# Patient Record
Sex: FEMALE | Race: WHITE | NOT HISPANIC OR LATINO | Employment: UNEMPLOYED | URBAN - METROPOLITAN AREA
[De-identification: names, ages, dates, MRNs, and addresses within clinical notes are randomized per-mention and may not be internally consistent; named-entity substitution may affect disease eponyms.]

---

## 2017-07-02 DIAGNOSIS — E03.9 ACQUIRED HYPOTHYROIDISM: ICD-10-CM

## 2017-07-03 NOTE — TELEPHONE ENCOUNTER
levothyroxine (SYNTHROID, LEVOTHROID) 137 MCG tablet     Last Written Prescription Date: 7/1/2016  Last Quantity: 45, # refills: 3  Last Office Visit with [facility], [facility] or [facility] prescribing provider: 11/23/2016        TSH   Date Value Ref Range Status   07/01/2016 2.87 0.40 - 4.00 mU/L Final     levothyroxine (SYNTHROID, LEVOTHROID) 125 MCG tablet     Last Written Prescription Date: 7/1/2016  Last Quantity: 45, # refills: 3  Last Office Visit with [facility], [facility] or [facility] prescribing provider: 11/23/2016        TSH   Date Value Ref Range Status   07/01/2016 2.87 0.40 - 4.00 mU/L Final

## 2017-07-05 RX ORDER — LEVOTHYROXINE SODIUM 137 UG/1
TABLET ORAL
Qty: 45 TABLET | Refills: 0 | Status: SHIPPED | OUTPATIENT
Start: 2017-07-05 | End: 2017-07-06

## 2017-07-05 RX ORDER — LEVOTHYROXINE SODIUM 125 UG/1
TABLET ORAL
Qty: 45 TABLET | Refills: 0 | Status: SHIPPED | OUTPATIENT
Start: 2017-07-05 | End: 2017-07-06

## 2017-07-05 NOTE — TELEPHONE ENCOUNTER
Prescription approved per [facility] Refill Protocol.  Patient due for labs for further refills.  Estephanie Corea RN - BC

## 2017-07-06 ENCOUNTER — OFFICE VISIT (OUTPATIENT)
Dept: FAMILY MEDICINE | Facility: CLINIC | Age: 47
End: 2017-07-06
Payer: MEDICAID

## 2017-07-06 ENCOUNTER — RADIANT APPOINTMENT (OUTPATIENT)
Dept: MAMMOGRAPHY | Facility: CLINIC | Age: 47
End: 2017-07-06
Payer: MEDICAID

## 2017-07-06 VITALS
HEART RATE: 64 BPM | HEIGHT: 63 IN | DIASTOLIC BLOOD PRESSURE: 92 MMHG | BODY MASS INDEX: 40.75 KG/M2 | SYSTOLIC BLOOD PRESSURE: 124 MMHG | TEMPERATURE: 99 F | WEIGHT: 230 LBS | OXYGEN SATURATION: 98 %

## 2017-07-06 DIAGNOSIS — Z12.31 VISIT FOR SCREENING MAMMOGRAM: ICD-10-CM

## 2017-07-06 DIAGNOSIS — E03.9 ACQUIRED HYPOTHYROIDISM: ICD-10-CM

## 2017-07-06 DIAGNOSIS — I10 HYPERTENSION GOAL BP (BLOOD PRESSURE) < 140/90: ICD-10-CM

## 2017-07-06 DIAGNOSIS — R05.9 COUGH: ICD-10-CM

## 2017-07-06 DIAGNOSIS — Z00.00 ROUTINE GENERAL MEDICAL EXAMINATION AT A HEALTH CARE FACILITY: Primary | ICD-10-CM

## 2017-07-06 PROBLEM — H35.30 MACULAR DEGENERATION (SENILE) OF RETINA: Status: ACTIVE | Noted: 2017-07-06

## 2017-07-06 LAB — TSH SERPL DL<=0.005 MIU/L-ACNC: 1.18 MU/L (ref 0.4–4)

## 2017-07-06 PROCEDURE — 36415 COLL VENOUS BLD VENIPUNCTURE: CPT | Performed by: NURSE PRACTITIONER

## 2017-07-06 PROCEDURE — G0202 SCR MAMMO BI INCL CAD: HCPCS | Mod: TC

## 2017-07-06 PROCEDURE — 99396 PREV VISIT EST AGE 40-64: CPT | Performed by: NURSE PRACTITIONER

## 2017-07-06 PROCEDURE — 84443 ASSAY THYROID STIM HORMONE: CPT | Performed by: NURSE PRACTITIONER

## 2017-07-06 RX ORDER — PRENATAL VIT/IRON FUM/FOLIC AC 27MG-0.8MG
1 TABLET ORAL DAILY
COMMUNITY

## 2017-07-06 RX ORDER — LEVOTHYROXINE SODIUM 125 UG/1
TABLET ORAL
Qty: 45 TABLET | Refills: 3 | Status: SHIPPED | OUTPATIENT
Start: 2017-07-06 | End: 2018-12-10

## 2017-07-06 RX ORDER — LEVOTHYROXINE SODIUM 137 UG/1
TABLET ORAL
Qty: 45 TABLET | Refills: 3 | Status: SHIPPED | OUTPATIENT
Start: 2017-07-06 | End: 2018-12-10

## 2017-07-06 NOTE — MR AVS SNAPSHOT
After Visit Summary   7/6/2017    Eunice Barrientos    MRN: [id number]           Patient Information     Date Of Birth          1970        Visit Information        Provider Department      7/6/2017 12:00 PM Kiara Quintana APRN [facility]        Today's Diagnoses     Routine general medical examination at a health care facility    -  1    Acquired hypothyroidism        Hypertension goal BP (blood pressure) < 140/90        Cough          Care Instructions    [facility] Hypertension Study Summary     Thank you for your interest in the [facility] hypertension research study. If you would like to enroll or know more about using your genetics to determine which hypertensive medications may work best for you please contact the research coordinator as [phone] or email [email or web address]    If enrolled you would be asked to attend 5 to 8 study visits over the next 12 months.    The  may reach out to you to ask some questions about your medical history and availability for future visits.         Preventive Health Recommendations  Female Ages 40 to 49    Yearly exam:     See your health care provider every year in order to  1. Review health changes.   2. Discuss preventive care.    3. Review your medicines if your doctor prescribed any.      Get a Pap test every three years (unless you have an abnormal result and your provider advises testing more often).      If you get Pap tests with HPV test, you only need to test every 5 years, unless you have an abnormal result. You do not need a Pap test if your uterus was removed (hysterectomy) and you have not had cancer.      You should be tested each year for STDs (sexually transmitted diseases), if you're at risk.       Ask your doctor if you should have a mammogram.      Have a colonoscopy (test for colon cancer) if someone in your family has had colon cancer or polyps before age 50.       Have a cholesterol test every 5  years.       Have a diabetes test (fasting glucose) after age 45. If you are at risk for diabetes, you should have this test every 3 years.    Shots: Get a flu shot each year. Get a tetanus shot every 10 years.     Nutrition:     Eat at least 5 servings of fruits and vegetables each day.    Eat whole-grain bread, whole-wheat pasta and brown rice instead of white grains and rice.    Talk to your provider about Calcium and Vitamin D.     Lifestyle    Exercise at least 150 minutes a week (an average of 30 minutes a day, 5 days a week). This will help you control your weight and prevent disease.    Limit alcohol to one drink per day.    No smoking.     Wear sunscreen to prevent skin cancer.    See your dentist every six months for an exam and cleaning.      [facility]    If you have any questions regarding to your visit please contact your care team:       Team Red:   Clinic Hours Telephone Number   Dr. Dorota Quintana, NP   7am-7pm  Monday - Thursday   7am-5pm  Fridays  [phone]  (Appointment scheduling available 24/7)    Questions about your visit?   Team Line  [phone]   Urgent Care - [place] and [place] - 11am-9pm Monday-Friday Saturday-Sunday- 9am-5pm   [place] - 5pm-9pm Monday-Friday Saturday-Sunday- 9am-5pm  [phone] - [place]   [phone] - [place]       What options do I have for visits at the clinic other than the traditional office visit?  To expand how we care for you, many of our providers are utilizing electronic visits (e-visits) and telephone visits, when medically appropriate, for interactions with their patients rather than a visit in the clinic.   We also offer nurse visits for many medical concerns. Just like any other service, we will bill your insurance company for this type of visit based on time spent on the phone with your provider. Not all insurance companies cover these visits. Please check  "with your medical insurance if this type of visit is covered. You will be responsible for any charges that are not paid by your insurance.      E-visits via RxVantagehart:  generally incur a $35.00 fee.  Telephone visits:  Time spent on the phone: *charged based on time that is spent on the phone in increments of 10 minutes. Estimated cost:   5-10 mins $30.00   11-20 mins. $59.00   21-30 mins. $85.00     Use IdeaSquarest (secure email communication and access to your chart) to send your primary care provider a message or make an appointment. Ask someone on your Team how to sign up for Tripvisto.  For a Price Quote for your services, please call our PromoteSocial Line at [phone].      As always, Thank you for trusting us with your health care needs!  Kamla YAÑEZ MA            Follow-ups after your visit        Who to contact     If you have questions or need follow up information about today's clinic visit or your schedule please contact [facility] directly at [phone].  Normal or non-critical lab and imaging results will be communicated to you by RxVantagehart, letter or phone within 4 business days after the clinic has received the results. If you do not hear from us within 7 days, please contact the clinic through IdeaSquarest or phone. If you have a critical or abnormal lab result, we will notify you by phone as soon as possible.  Submit refill requests through Tripvisto or call your pharmacy and they will forward the refill request to us. Please allow 3 business days for your refill to be completed.          Additional Information About Your Visit        Tripvisto Information     Tripvisto lets you send messages to your doctor, view your test results, renew your prescriptions, schedule appointments and more. To sign up, go to [email or web address] . Click on \"Log in\" on the left side of the screen, which will take you to the Welcome page. Then click on \"Sign up Now\" on the right side of the page.     You will be " "asked to enter the access code listed below, as well as some personal information. Please follow the directions to create your username and password.     Your access code is: [email or web address]  Expires: 10/4/2017 12:43 PM     Your access code will  in 90 days. If you need help or a new code, please call your [place] clinic or [phone].        Care EveryWhere ID     This is your Care EveryWhere ID. This could be used by other organizations to access your [place] medical records  [id number]        Your Vitals Were     Pulse Temperature Height Last Period Pulse Oximetry Breastfeeding?    64 99  F (37.2  C) (Oral) 5' 3.25\" (1.607 m) 06/15/2017 (Approximate) 98% No    BMI (Body Mass Index)                   40.42 kg/m2            Blood Pressure from Last 3 Encounters:   17 (!) 124/92   16 128/88   16 120/82    Weight from Last 3 Encounters:   17 230 lb (104.3 kg)   16 222 lb (100.7 kg)   16 225 lb (102.1 kg)              We Performed the Following     TSH with free T4 reflex          Today's Medication Changes          These changes are accurate as of: 17 12:43 PM.  If you have any questions, ask your nurse or doctor.               Start taking these medicines.        Dose/Directions    omeprazole 20 MG CR capsule   Commonly known as:  priLOSEC   Used for:  Cough   Started by:  Kiara Quintana APRN CNP        Dose:  20 mg   Take 1 capsule (20 mg) by mouth daily   Quantity:  30 capsule   Refills:  1         These medicines have changed or have updated prescriptions.        Dose/Directions    * levothyroxine 125 MCG tablet   Commonly known as:  SYNTHROID/LEVOTHROID   This may have changed:  See the new instructions.   Used for:  Acquired hypothyroidism   Changed by:  Kiara Quintana APRN CNP        TAKE ONE TABLET BY MOUTH EVERY OTHER DAY ALTERNATING  WITH  THE  137  MCG  TABLETS   Quantity:  45 tablet   Refills:  3       * levothyroxine 137 MCG tablet "   Commonly known as:  SYNTHROID/LEVOTHROID   This may have changed:  See the new instructions.   Used for:  Acquired hypothyroidism   Changed by:  Kiara Quintana APRN CNP        TAKE ONE TABLET BY MOUTH EVERY OTHER DAY ALTERNATING  WITH  THE  125MCG  TABLETS   Quantity:  45 tablet   Refills:  3       * Notice:  This list has 2 medication(s) that are the same as other medications prescribed for you. Read the directions carefully, and ask your doctor or other care provider to review them with you.         Where to get your medicines      These medications were sent to [facility] [address] [facility] [address]  [address] [facility] [address]     Phone:  [phone]     levothyroxine 125 MCG tablet    levothyroxine 137 MCG tablet    omeprazole 20 MG CR capsule                Primary Care Provider Office Phone # Fax #    ABIOLA Burgess [phone]       [facility] [address] [place] [address]        Equal Access to Services     KASSI JAFFE : Hadii aad ku hadasho Soomaali, waaxda luqadaha, qaybta kaalmada adeegyada, waxay idiin hayaagamal greer . So [facility] [phone].    ATENCIÓN: Si habla español, tiene a garcia disposición servicios gratuitos de asistencia lingüística. [facility] [phone].    We comply with applicable federal civil rights laws and [place] laws. We do not discriminate on the basis of race, color, national origin, age, disability sex, sexual orientation or gender identity.            Thank you!     Thank you for choosing [facility]  for your care. Our goal is always to provide you with excellent care. Hearing back from our patients is one way we can continue to improve our services. Please take a few minutes to complete the written survey that you may receive in the mail after your visit with us. Thank you!             Your Updated Medication List - Protect others around you:  Learn how to safely use, store and throw away your medicines at www.disposemymeds.org.          This list is accurate as of: 7/6/17 12:43 PM.  Always use your most recent med list.                   Brand Name Dispense Instructions for use Diagnosis    * levothyroxine 125 MCG tablet    SYNTHROID/LEVOTHROID    45 tablet    TAKE ONE TABLET BY MOUTH EVERY OTHER DAY ALTERNATING  WITH  THE  137  MCG  TABLETS    Acquired hypothyroidism       * levothyroxine 137 MCG tablet    SYNTHROID/LEVOTHROID    45 tablet    TAKE ONE TABLET BY MOUTH EVERY OTHER DAY ALTERNATING  WITH  THE  125MCG  TABLETS    Acquired hypothyroidism       omeprazole 20 MG CR capsule    priLOSEC    30 capsule    Take 1 capsule (20 mg) by mouth daily    Cough       prenatal multivitamin  plus iron 27-0.8 MG Tabs per tablet      Take 1 tablet by mouth daily        PROBIOTIC DAILY PO           * Notice:  This list has 2 medication(s) that are the same as other medications prescribed for you. Read the directions carefully, and ask your doctor or other care provider to review them with you.

## 2017-07-06 NOTE — PATIENT INSTRUCTIONS
[facility] Hypertension Study Summary     Thank you for your interest in the [facility] hypertension research study. If you would like to enroll or know more about using your genetics to determine which hypertensive medications may work best for you please contact the research coordinator as [phone] or email [email or web address]    If enrolled you would be asked to attend 5 to 8 study visits over the next 12 months.    The  may reach out to you to ask some questions about your medical history and availability for future visits.         Preventive Health Recommendations  Female Ages 40 to 49    Yearly exam:     See your health care provider every year in order to  1. Review health changes.   2. Discuss preventive care.    3. Review your medicines if your doctor prescribed any.      Get a Pap test every three years (unless you have an abnormal result and your provider advises testing more often).      If you get Pap tests with HPV test, you only need to test every 5 years, unless you have an abnormal result. You do not need a Pap test if your uterus was removed (hysterectomy) and you have not had cancer.      You should be tested each year for STDs (sexually transmitted diseases), if you're at risk.       Ask your doctor if you should have a mammogram.      Have a colonoscopy (test for colon cancer) if someone in your family has had colon cancer or polyps before age 50.       Have a cholesterol test every 5 years.       Have a diabetes test (fasting glucose) after age 45. If you are at risk for diabetes, you should have this test every 3 years.    Shots: Get a flu shot each year. Get a tetanus shot every 10 years.     Nutrition:     Eat at least 5 servings of fruits and vegetables each day.    Eat whole-grain bread, whole-wheat pasta and brown rice instead of white grains and rice.    Talk to your provider about Calcium and Vitamin D.     Lifestyle    Exercise at least 150 minutes a week (an average of 30  minutes a day, 5 days a week). This will help you control your weight and prevent disease.    Limit alcohol to one drink per day.    No smoking.     Wear sunscreen to prevent skin cancer.    See your dentist every six months for an exam and cleaning.      [facility]    If you have any questions regarding to your visit please contact your care team:       Team Red:   Clinic Hours Telephone Number   Dr. Dorota Quintana, NP   7am-7pm  Monday - Thursday   7am-5pm  Fridays  [phone]  (Appointment scheduling available 24/7)    Questions about your visit?   Team Line  [phone]   Urgent Care - [place] and [place] - 11am-9pm Monday-Friday Saturday-Sunday- 9am-5pm   [place] - 5pm-9pm Monday-Friday Saturday-Sunday- 9am-5pm  [phone] - [place]   [phone] - [place]       What options do I have for visits at the clinic other than the traditional office visit?  To expand how we care for you, many of our providers are utilizing electronic visits (e-visits) and telephone visits, when medically appropriate, for interactions with their patients rather than a visit in the clinic.   We also offer nurse visits for many medical concerns. Just like any other service, we will bill your insurance company for this type of visit based on time spent on the phone with your provider. Not all insurance companies cover these visits. Please check with your medical insurance if this type of visit is covered. You will be responsible for any charges that are not paid by your insurance.      E-visits via Race Nation:  generally incur a $35.00 fee.  Telephone visits:  Time spent on the phone: *charged based on time that is spent on the phone in increments of 10 minutes. Estimated cost:   5-10 mins $30.00   11-20 mins. $59.00   21-30 mins. $85.00     Use Race Nation (secure email communication and access to your chart) to send your primary care provider a  message or make an appointment. Ask someone on your Team how to sign up for Healthkart.  For a Price Quote for your services, please call our Consumer Price Line at [phone].      As always, Thank you for trusting us with your health care needs!  Kamla YAÑEZ MA

## 2017-07-06 NOTE — NURSING NOTE
"Chief Complaint   Patient presents with     Physical       Initial /90  Pulse 64  Temp 99  F (37.2  C) (Oral)  Ht 5' 3.25\" (1.607 m)  Wt 230 lb (104.3 kg)  LMP 06/15/2017 (Approximate)  SpO2 98%  Breastfeeding? No  BMI 40.42 kg/m2 Estimated body mass index is 40.42 kg/(m^2) as calculated from the following:    Height as of this encounter: 5' 3.25\" (1.607 m).    Weight as of this encounter: 230 lb (104.3 kg).  Medication Reconciliation: complete   Kamla YAÑEZ MA      "

## 2017-07-06 NOTE — LETTER
[address] [place] [address]    July 7, 2017    Eunice Barrientos  [address]  [place] [facility] [address]          Dear Eunice,    Your results are normal    Enclosed is a copy of your results.     Results for orders placed or performed in visit on 07/06/17   TSH with free T4 reflex   Result Value Ref Range    TSH 1.18 0.40 - 4.00 mU/L       If you have any questions or concerns, please call myself or my nurse at [phone].      Sincerely,        Kiara Quintana CNP/HA

## 2017-07-06 NOTE — PROGRESS NOTES
SUBJECTIVE:   CC: Eunice Barrientos is an 47 year old woman who presents for preventive health visit.     Healthy Habits:    Do you get at least three servings of calcium containing foods daily (dairy, green leafy vegetables, etc.)? no    Amount of exercise or daily activities, outside of work: active job    Problems taking medications regularly No    Medication side effects: No    Have you had an eye exam in the past two years? no    Do you see a dentist twice per year? no    Do you have sleep apnea, excessive snoring or daytime drowsiness?no    Had lost insurance so off BP meds. Just purchased a gym membership and plans to start exercising.      Today's PHQ-2 Score:   PHQ-2 ( 1999 [employer]) 7/1/2016 12/31/2015   Q1: Little interest or pleasure in doing things 0 0   Q2: Feeling down, depressed or hopeless 0 0   PHQ-2 Score 0 0       Abuse: Current or Past(Physical, Sexual or Emotional)- No  Do you feel safe in your environment - Yes    Social History   Substance Use Topics     Smoking status: Never Smoker     Smokeless tobacco: Not on file     Alcohol use No     The patient does not drink >3 drinks per day nor >7 drinks per week.    Reviewed orders with patient.  Reviewed health maintenance and updated orders accordingly - Yes  Labs reviewed in EPIC  Patient Active Problem List   Diagnosis     Hypothyroidism     Intracranial shunt     CARDIOVASCULAR SCREENING; LDL GOAL LESS THAN 130     Hypertension goal BP (blood pressure) < 140/90     Macular degeneration (senile) of retina     Past Surgical History:   Procedure Laterality Date     CHOLECYSTECTOMY       COLPOSCOPY CERVIX, BIOPSY CERVIX, ENDOCERVICAL CURETTAGE, COMBINED  2009    several, earliest was 1992     ENT SURGERY       intracranial shunt       LEEP TX, CERVICAL  1995     OOPHORECTOMY Left        Social History   Substance Use Topics     Smoking status: Never Smoker     Smokeless tobacco: Not on file     Alcohol use No     Family History   Problem Relation  "Age of Onset     DIABETES Maternal Grandmother      Hypertension Maternal Grandmother      CANCER Maternal Aunt      CEREBROVASCULAR DISEASE No family hx of      Thyroid Disease No family hx of      Glaucoma No family hx of      Macular Degeneration No family hx of            Patient under age 50, mutual decision reflected in health maintenance.      Pertinent mammograms are reviewed under the imaging tab.  History of abnormal Pap smear: YES - updated in Problem List and Health Maintenance accordingly    Reviewed and updated as needed this visit by clinical staff         Reviewed and updated as needed this visit by Provider            ROS:  C: NEGATIVE for fever, chills, change in weight  I: NEGATIVE for worrisome rashes, moles or lesions  E: NEGATIVE for vision changes or irritation  ENT: NEGATIVE for ear, mouth and throat problems  RESP: Occasional dry, nagging cough in absence of URI symptoms or SOB  B: NEGATIVE for masses, tenderness or discharge  CV: NEGATIVE for chest pain, palpitations or peripheral edema  GI: Occasional heartburn. Regular bowel movements.  : NEGATIVE for unusual urinary or vaginal symptoms. Periods are regular.  M: NEGATIVE for significant arthralgias or myalgia  N: NEGATIVE for weakness, dizziness or paresthesias  P: NEGATIVE for changes in mood or affect    OBJECTIVE:   BP (!) 124/92  Pulse 64  Temp 99  F (37.2  C) (Oral)  Ht 5' 3.25\" (1.607 m)  Wt 230 lb (104.3 kg)  LMP 06/15/2017 (Approximate)  SpO2 98%  Breastfeeding? No  BMI 40.42 kg/m2  EXAM:  GENERAL: healthy, alert and no distress  EYES: Eyes grossly normal to inspection, PERRL and conjunctivae and sclerae normal  HENT: Right ear canals and TM normal, Left TM with significant scar tissue, post-auricular scar from prior mastoidectomy, nose and mouth without ulcers or lesions  NECK: no adenopathy, no asymmetry, masses, or scars and thyroid normal to palpation  RESP: lungs clear to auscultation - no rales, rhonchi or " wheezes  BREAST: normal without masses, tenderness or nipple discharge and no palpable axillary masses or adenopathy  CV: regular rate and rhythm, normal S1 S2, no S3 or S4, no murmur, click or rub, no peripheral edema and peripheral pulses strong  ABDOMEN: soft, nontender, no hepatosplenomegaly, no masses and bowel sounds normal  MS: no gross musculoskeletal defects noted, no edema  SKIN: no suspicious lesions or rashes  NEURO: Normal strength and tone, mentation intact and speech normal  PSYCH: mentation appears normal, affect normal/bright    ASSESSMENT/PLAN:   1. Routine general medical examination at a health care facility      2. Acquired hypothyroidism  Stable, continue current medications without change. Needs labs today  - levothyroxine (SYNTHROID/LEVOTHROID) 125 MCG tablet; TAKE ONE TABLET BY MOUTH EVERY OTHER DAY ALTERNATING  WITH  THE  137  MCG  TABLETS  Dispense: 45 tablet; Refill: 3  - levothyroxine (SYNTHROID/LEVOTHROID) 137 MCG tablet; TAKE ONE TABLET BY MOUTH EVERY OTHER DAY ALTERNATING  WITH  THE  125MCG  TABLETS  Dispense: 45 tablet; Refill: 3  - TSH with free T4 reflex    3. Hypertension goal BP (blood pressure) < 140/90  She is interested in PGEN study- given information to contact . If she qualifies, she will schedule follow up with me to get enrolled. If she does not qualify, please contact clinic and will restart HCTZ over the phone prior to our follow up visit.    4. Cough  Suspect due to GERD. Start PPI daily for 1 month. If symptoms controlled can wean off or use PRN.  - omeprazole (PRILOSEC) 20 MG CR capsule; Take 1 capsule (20 mg) by mouth daily  Dispense: 30 capsule; Refill: 1    COUNSELING:   Reviewed preventive health counseling, as reflected in patient instructions       Regular exercise       Healthy diet/nutrition       Osteoporosis Prevention/Bone Health         reports that she has never smoked. She does not have any smokeless tobacco history on  "file.    Estimated body mass index is 39.33 kg/(m^2) as calculated from the following:    Height as of 11/23/16: 5' 3\" (1.6 m).    Weight as of 11/23/16: 222 lb (100.7 kg).   Weight management plan: Discussed healthy diet and exercise guidelines and patient will follow up in 12 months in clinic to re-evaluate.    Counseling Resources:  ATP IV Guidelines  Pooled Cohorts Equation Calculator  Breast Cancer Risk Calculator  FRAX Risk Assessment  ICSI Preventive Guidelines  Dietary Guidelines for Americans, 2010  USDA's MyPlate  ASA Prophylaxis  Lung CA Screening    ABIOLA Burgess [facility]  "

## 2017-11-07 ENCOUNTER — TELEPHONE (OUTPATIENT)
Dept: FAMILY MEDICINE | Facility: CLINIC | Age: 47
End: 2017-11-07

## 2017-11-07 NOTE — LETTER
November 7, 2017          Eunice Barrientos,  [address]   [place] [address]      Dear Eunice Barrientos      Monitoring and managing your preventative and chronic health conditions are very important to us. Our records indicate that you have not scheduled for Appointment with your provider  which was recommended by Kiara Quintana.      If you have received your health care elsewhere, please call the clinic so the information can be documented in your chart.    Please call [phone] or message us through your [employer] account to schedule an appointment or provide information for your chart.     Feel free to contact us if you have any questions or concerns!    I look forward to seeing you and working with you on your health care needs.     Sincerely,       Kiara Quintana / Karly SALES CMA ([facility])

## 2017-11-07 NOTE — TELEPHONE ENCOUNTER
Panel Management Review      Patient has the following on her problem list:     Hypertension   Last three blood pressure readings:  BP Readings from Last 3 Encounters:   07/06/17 (!) 124/92   11/23/16 128/88   07/08/16 120/82     Blood pressure: FAILED    HTN Guidelines:  Age 18-59 BP range:  Less than 140/90  Age 60-85 with Diabetes:  Less than 140/90  Age 60-85 without Diabetes:  less than 150/90        Composite cancer screening  Chart review shows that this patient is due/due soon for the following None  Summary:    Patient is due/failing the following:   BP CHECK    Action needed:   Patient needs office visit for BP check.    Type of outreach:    Sent letter.    Questions for provider review:    None                                                                                                                                    Karly Deshpande CMA ([facility])       Chart routed to sent letter .

## 2018-02-20 ENCOUNTER — TELEPHONE (OUTPATIENT)
Dept: FAMILY MEDICINE | Facility: CLINIC | Age: 48
End: 2018-02-20

## 2018-02-20 NOTE — LETTER
February 20, 2018          Eunice Barrientos,  [address]   [place] [address]        Dear Eunice Barrientos      Monitoring and managing your preventative and chronic health conditions are very important to us. Our records indicate that you have not scheduled for Appointment with your provider  which was recommended by Kiara Quintana.      If you have received your health care elsewhere, please call the clinic so the information can be documented in your chart.    Please call [phone] or message us through your [employer] account to schedule an appointment or provide information for your chart.     Feel free to contact us if you have any questions or concerns!    I look forward to seeing you and working with you on your health care needs.     Sincerely,         Kiara Quintana / heidi

## 2018-02-20 NOTE — TELEPHONE ENCOUNTER
Panel Management Review      Patient has the following on her problem list:     Hypertension   Last three blood pressure readings:  BP Readings from Last 3 Encounters:   07/06/17 (!) 124/92   11/23/16 128/88   07/08/16 120/82     Blood pressure: FAILED    HTN Guidelines:  Age 18-59 BP range:  Less than 140/90  Age 60-85 with Diabetes:  Less than 140/90  Age 60-85 without Diabetes:  less than 150/90      Composite cancer screening  Chart review shows that this patient is due/due soon for the following None  Summary:    Patient is due/failing the following:   BP CHECK    Action needed:   Patient needs office visit for her blood pressure.    Type of outreach:    Sent letter.    Questions for provider review:    None                                                                                                                                    Syl Coe MA

## 2018-05-24 ENCOUNTER — TELEPHONE (OUTPATIENT)
Dept: FAMILY MEDICINE | Facility: CLINIC | Age: 48
End: 2018-05-24

## 2018-05-24 NOTE — LETTER
May 24, 2018        Eunice Barrientos,  [address] [facility] [address]        Dear Eunice Barrientos      Monitoring and managing your preventative and chronic health conditions are very important to us. Our records indicate that you have not scheduled for a Blood Pressure follow up which was recommended by Kiara Quintana.      If you have received your health care elsewhere, please call the clinic so the information can be documented in your chart.    Please call [phone] or message us through your [email or web address] account to schedule an appointment or provide information for your chart.     Feel free to contact us if you have any questions or concerns!    I look forward to seeing you and working with you on your health care needs.     Sincerely,         Kiara Quintana / heidi

## 2018-05-24 NOTE — TELEPHONE ENCOUNTER
Panel Management Review      Patient has the following on her problem list:     Hypertension   Last three blood pressure readings:  BP Readings from Last 3 Encounters:   07/06/17 (!) 124/92   11/23/16 128/88   07/08/16 120/82     Blood pressure: FAILED    HTN Guidelines:  Age 18-59 BP range:  Less than 140/90  Age 60-85 with Diabetes:  Less than 140/90  Age 60-85 without Diabetes:  less than 150/90      Composite cancer screening  Chart review shows that this patient is due/due soon for the following None  Summary:    Patient is due/failing the following:   BP CHECK    Action needed:   Patient needs office visit for blood pressure check.    Type of outreach:    Sent letter.    Questions for provider review:    None                                                                                                                                    Syl Coe MA

## 2018-06-07 ENCOUNTER — OFFICE VISIT (OUTPATIENT)
Dept: FAMILY MEDICINE | Facility: CLINIC | Age: 48
End: 2018-06-07
Payer: COMMERCIAL

## 2018-06-07 VITALS
HEIGHT: 63 IN | SYSTOLIC BLOOD PRESSURE: 142 MMHG | BODY MASS INDEX: 41.36 KG/M2 | WEIGHT: 233.4 LBS | HEART RATE: 98 BPM | OXYGEN SATURATION: 98 % | RESPIRATION RATE: 18 BRPM | DIASTOLIC BLOOD PRESSURE: 98 MMHG | TEMPERATURE: 98.1 F

## 2018-06-07 DIAGNOSIS — Z98.2 INTRACRANIAL SHUNT: ICD-10-CM

## 2018-06-07 DIAGNOSIS — H35.30 MACULAR DEGENERATION (SENILE) OF RETINA: ICD-10-CM

## 2018-06-07 DIAGNOSIS — H43.393 VITREOUS FLOATERS OF BOTH EYES: ICD-10-CM

## 2018-06-07 DIAGNOSIS — I10 ESSENTIAL HYPERTENSION: ICD-10-CM

## 2018-06-07 DIAGNOSIS — R51.9 NONINTRACTABLE EPISODIC HEADACHE, UNSPECIFIED HEADACHE TYPE: Primary | ICD-10-CM

## 2018-06-07 LAB
ANION GAP SERPL CALCULATED.3IONS-SCNC: 12 MMOL/L (ref 3–14)
BASOPHILS # BLD AUTO: 0 10E9/L (ref 0–0.2)
BASOPHILS NFR BLD AUTO: 0.4 %
BUN SERPL-MCNC: 14 MG/DL (ref 7–30)
CALCIUM SERPL-MCNC: 8.4 MG/DL (ref 8.5–10.1)
CHLORIDE SERPL-SCNC: 109 MMOL/L (ref 94–109)
CO2 SERPL-SCNC: 21 MMOL/L (ref 20–32)
CREAT SERPL-MCNC: 0.84 MG/DL (ref 0.52–1.04)
DIFFERENTIAL METHOD BLD: NORMAL
EOSINOPHIL # BLD AUTO: 0.1 10E9/L (ref 0–0.7)
EOSINOPHIL NFR BLD AUTO: 1.2 %
ERYTHROCYTE [DISTWIDTH] IN BLOOD BY AUTOMATED COUNT: 12.7 % (ref 10–15)
GFR SERPL CREATININE-BSD FRML MDRD: 73 ML/MIN/1.7M2
GLUCOSE SERPL-MCNC: 82 MG/DL (ref 70–99)
HCT VFR BLD AUTO: 40.5 % (ref 35–47)
HGB BLD-MCNC: 13.6 G/DL (ref 11.7–15.7)
LYMPHOCYTES # BLD AUTO: 1.8 10E9/L (ref 0.8–5.3)
LYMPHOCYTES NFR BLD AUTO: 25.1 %
MCH RBC QN AUTO: 32.7 PG (ref 26.5–33)
MCHC RBC AUTO-ENTMCNC: 33.6 G/DL (ref 31.5–36.5)
MCV RBC AUTO: 97 FL (ref 78–100)
MONOCYTES # BLD AUTO: 0.4 10E9/L (ref 0–1.3)
MONOCYTES NFR BLD AUTO: 5.4 %
NEUTROPHILS # BLD AUTO: 4.9 10E9/L (ref 1.6–8.3)
NEUTROPHILS NFR BLD AUTO: 67.9 %
PLATELET # BLD AUTO: 248 10E9/L (ref 150–450)
POTASSIUM SERPL-SCNC: 4.1 MMOL/L (ref 3.4–5.3)
RBC # BLD AUTO: 4.16 10E12/L (ref 3.8–5.2)
SODIUM SERPL-SCNC: 142 MMOL/L (ref 133–144)
TSH SERPL DL<=0.005 MIU/L-ACNC: 2.48 MU/L (ref 0.4–4)
WBC # BLD AUTO: 7.3 10E9/L (ref 4–11)

## 2018-06-07 PROCEDURE — 85025 COMPLETE CBC W/AUTO DIFF WBC: CPT | Performed by: FAMILY MEDICINE

## 2018-06-07 PROCEDURE — 84443 ASSAY THYROID STIM HORMONE: CPT | Performed by: FAMILY MEDICINE

## 2018-06-07 PROCEDURE — 80048 BASIC METABOLIC PNL TOTAL CA: CPT | Performed by: FAMILY MEDICINE

## 2018-06-07 PROCEDURE — 99214 OFFICE O/P EST MOD 30 MIN: CPT | Performed by: FAMILY MEDICINE

## 2018-06-07 PROCEDURE — 36415 COLL VENOUS BLD VENIPUNCTURE: CPT | Performed by: FAMILY MEDICINE

## 2018-06-07 RX ORDER — LISINOPRIL 10 MG/1
10 TABLET ORAL DAILY
Qty: 90 TABLET | Refills: 3 | Status: SHIPPED | OUTPATIENT
Start: 2018-06-07 | End: 2018-12-10 | Stop reason: SINTOL

## 2018-06-07 NOTE — PATIENT INSTRUCTIONS
Please make appointment for MRI  See opthamlogist Today  Follow up Primary Provider tomorrow  Start Lisinopril daily  Make appointment with Neurologist  Take care  Katherin Palm MD                                         Dietary Approaches to Stop Hypertension (The DASH Diet)   What is hypertension?   Hypertension is blood pressure that keeps being higher than normal. Blood pressure is the force of blood against artery walls as the heart pumps blood through the body. Blood pressure can be unhealthy if it is above 120/80. The higher your blood pressure, the greater the health risk.   High blood pressure can be controlled if you take these steps:   Maintain a healthy weight.   Are physically active.   Follow a healthy eating plan, which includes foods that do not have a lot of salt and sodium.   Do not drink a lot of alcohol.   Diet affects high blood pressure. Following the DASH diet and reducing the amount of sodium in your diet will help lower your blood pressure. It will also help prevent high blood pressure.   What is the DASH diet?   Dietary Approaches to Stop Hypertension (DASH) is a diet that is low in saturated fat, cholesterol, and total fat. It emphasizes fruits, vegetables, and low-fat dairy foods. The DASH diet also includes whole-grain products, fish, poultry, and nuts. It encourages fewer servings of red meat, sweets, and sugar-containing beverages. It is rich in magnesium, potassium, and calcium, as well as protein and fiber.   How do I get started on the DASH diet?   The DASH diet requires no special foods and has no hard-to-follow recipes. Start by seeing how DASH compares with your current eating habits.   The DASH eating plan illustrated below is based on a diet of 2,000 calories a day. Your healthcare provider or a dietitian can help you determine how many calories a day you need. Most adults need somewhere between 1600 and 2800 calories a day. Serving sizes for different foods vary from 1/2 cup to  1 and 1/4 cups. Check product nutrition labels for serving sizes and the number of calories per serving.                      Number of        Examples of  Food Group      servings         serving size  ----------------------------------------------------------------    Grains and      7 to 8 per day   1 slice of bread  grain products                   1 cup ready-to-eat cold cereal                                   1/2 cup cooked rice, pasta,                                   or cereal    Vegetables      4 to 5 per day   1 cup raw leafy vegetable                                   1/2 cup cooked vegetable                                   6 ounces (oz) vegetable juice      Fruits          4 to 5 per day   1 medium fruit                                   1/4 cup dried fruit                                   1/2 cup fresh, frozen, or                                   canned fruit                                   6 oz fruit juice      Low-fat or      2 to 3 per day   8 oz milk  fat-free                         1 cup yogurt  dairy foods                      1 and 1/2 oz cheese    Lean meats,  poultry,        2 or fewer per   3 oz cooked lean meat,  or fish         day              skinless poultry, or fish    Nuts, seeds,    4 to 5 per week  1/3 cup or 1 and 1/2 oz nuts  and dry beans                    1 tablespoon or 1/2 oz seeds                                   1/2 cup cooked dry beans    Fats and oils   2 to 3 per day   1 teaspoon soft margarine                                   1 tablespoon low-fat mayonnaise                                   2 tablespoons light salad                                   dressing                                   1 teaspoon vegetable oil    Sweets          5 per week       1 tablespoon sugar                                   1 tablespoon jelly or jam                                   1/2 oz jelly beans                                   8 oz  lemonade  ----------------------------------------------------------------  Make changes gradually. Here are some suggestions that might help:   If you now eat 1 or 2 servings of vegetables a day, add a serving at lunch and another at dinner.   If you have not been eating fruit regularly, or have only juice at breakfast, add a serving to your meals or have it as a snack.   Drink milk or water with lunch or dinner instead of soda, sugar-sweetened tea, or alcohol. Choose low-fat (1%) or fat-free (nonfat) dairy products so that you are eating fewer calories and less saturated fat, total fat, and cholesterol.   Read food labels on margarines and salad dressings to choose products lowest in fat.   If you now eat large portions of meat, slowly cut back--by a half or a third at each meal. Limit meat to 6 ounces a day (two 3-ounce servings). Three to 4 ounces is about the size of a deck of cards.   Have 2 or more meatless meals each week. Increase servings of vegetables, rice, pasta, and beans in all meals. Try casseroles, pasta, and stir-palmer dishes, which have less meat and more vegetables, grains, and beans.   Use fruits canned in their own juice. Fresh fruits require little or no preparation. Dried fruits are a good choice to carry with you or to have ready in the car.   Try these snacks ideas: unsalted pretzels or nuts mixed with raisins, julita crackers, low-fat and fat-free yogurt or frozen yogurt, popcorn with no salt or butter added, and raw vegetables.   Choose whole-grain foods to get more nutrients, including minerals and fiber. For example, choose whole-wheat bread, whole-grain cereals, or brown rice.   Use fresh, frozen, or no-salt-added canned vegetables.   Remember to also reduce the salt and sodium in your diet. Try to have no more than 2000 milligrams (mg) of sodium per day, with a goal of further reducing the sodium to 1500 mg per day. Three important ways to reduce sodium are:   Eat food products with  reduced-sodium or no salt added.   Use less salt when you prepare foods and do not add salt to your food at the table.   Read food labels. Aim for foods that contain less than 5% of the daily value of sodium.   The DASH eating plan is not designed for weight loss. But it contains many lower-calorie foods, such as fruits and vegetables. You can make it lower in calories by replacing high-calorie foods with more fruits and vegetables. Some ideas to increase fruits and vegetables and decrease calories include:   Eat a medium apple instead of 4 shortbread cookies. You'll save 80 calories.   Eat 1/4 cup of dried apricots instead of a 2-ounce bag of pork rinds. You'll save 230 calories.   Have a hamburger that's 3 ounces instead of 6 ounces. Add a 1/2 cup serving of carrots and a 1/2 cup serving of spinach. You'll save more than 200 calories.   Instead of 5 ounces of chicken, have a stir palmer with 2 ounces of chicken and 1 and 1/2 cups of raw vegetables. Use just a small amount of vegetable oil. You'll save 50 calories.   Have a 1/2 cup serving of low-fat frozen yogurt instead of a 1-and-1/2-ounce chocolate bar. You'll save about 110 calories.   Use low-fat or fat-free condiments, such as fat-free salad dressings.   Eat smaller portions. Cut back gradually.   Use food labels to compare fat and calorie content in packaged foods. Items marked low fat or fat free may be lower in fat but not lower in calories than their regular versions.   Limit foods with lots of added sugar, such as pies, flavored yogurts, candy bars, ice cream, sherbet, regular soft drinks, and fruit drinks.   Drink water or club soda instead of cola or other soda drinks.     For more information, see the National Heart, Lung, and Blood [place] Web site at: http://www.nhlbi.nih.gov/health/public/heart/hbp/dash/.     [facility] right across the [address] from [facility] on [address]. appt at 5:15pm

## 2018-06-07 NOTE — LETTER
[facility]  [address] [place] [address]    June 11, 2018    Eunice Barrientos  [address]  [place] [address]          Dear Eunice,    [facility] Thyroid test  Electrolytes and kidney tests are normal.   Normal Blood count  Take care    Enclosed is a copy of your results.     Results for orders placed or performed in visit on 06/07/18   TSH WITH FREE T4 REFLEX   Result Value Ref Range    TSH 2.48 0.40 - 4.00 mU/L   Basic metabolic panel   Result Value Ref Range    Sodium 142 133 - 144 mmol/L    Potassium 4.1 3.4 - 5.3 mmol/L    Chloride 109 94 - 109 mmol/L    Carbon Dioxide 21 20 - 32 mmol/L    Anion Gap 12 3 - 14 mmol/L    Glucose 82 70 - 99 mg/dL    Urea Nitrogen 14 7 - 30 mg/dL    Creatinine 0.84 0.52 - 1.04 mg/dL    GFR Estimate 73 >60 mL/min/1.7m2    GFR Estimate If Black 88 >60 mL/min/1.7m2    Calcium 8.4 (L) 8.5 - 10.1 mg/dL   CBC with platelets differential   Result Value Ref Range    WBC 7.3 4.0 - 11.0 10e9/L    RBC Count 4.16 3.8 - 5.2 10e12/L    Hemoglobin 13.6 11.7 - 15.7 g/dL    Hematocrit 40.5 35.0 - 47.0 %    MCV 97 78 - 100 fl    MCH 32.7 26.5 - 33.0 pg    MCHC 33.6 31.5 - 36.5 g/dL    RDW 12.7 10.0 - 15.0 %    Platelet Count 248 150 - 450 10e9/L    Diff Method Automated Method     % Neutrophils 67.9 %    % Lymphocytes 25.1 %    % Monocytes 5.4 %    % Eosinophils 1.2 %    % Basophils 0.4 %    Absolute Neutrophil 4.9 1.6 - 8.3 10e9/L    Absolute Lymphocytes 1.8 0.8 - 5.3 10e9/L    Absolute Monocytes 0.4 0.0 - 1.3 10e9/L    Absolute Eosinophils 0.1 0.0 - 0.7 10e9/L    Absolute Basophils 0.0 0.0 - 0.2 10e9/L       If you have any questions or concerns, please call myself or my nurse at [phone].      Sincerely,        Katherin Palm MD/steve

## 2018-06-07 NOTE — MR AVS SNAPSHOT
After Visit Summary   6/7/2018    Eunice Barrientos    MRN: [id number]           Patient Information     Date Of Birth          1970        Visit Information        Provider Department      6/7/2018 12:40 PM Katherin Palm MD [facility]        Today's Diagnoses     Nonintractable episodic headache, unspecified headache type    -  1    Vitreous floaters of both eyes        Essential hypertension        Macular degeneration (senile) of retina        Intracranial shunt          Care Instructions    Please make appointment for MRI  See opthamlogist Today  Follow up Primary Provider tomorrow  Start Lisinopril daily  Make appointment with Neurologist  Take care  Katherin Palm MD                                         Dietary Approaches to Stop Hypertension (The DASH Diet)   What is hypertension?   Hypertension is blood pressure that keeps being higher than normal. Blood pressure is the force of blood against artery walls as the heart pumps blood through the body. Blood pressure can be unhealthy if it is above 120/80. The higher your blood pressure, the greater the health risk.   High blood pressure can be controlled if you take these steps:   Maintain a healthy weight.   Are physically active.   Follow a healthy eating plan, which includes foods that do not have a lot of salt and sodium.   Do not drink a lot of alcohol.   Diet affects high blood pressure. Following the DASH diet and reducing the amount of sodium in your diet will help lower your blood pressure. It will also help prevent high blood pressure.   What is the DASH diet?   Dietary Approaches to Stop Hypertension (DASH) is a diet that is low in saturated fat, cholesterol, and total fat. It emphasizes fruits, vegetables, and low-fat dairy foods. The DASH diet also includes whole-grain products, fish, poultry, and nuts. It encourages fewer servings of red meat, sweets, and sugar-containing beverages. It is rich in magnesium, potassium,  and calcium, as well as protein and fiber.   How do I get started on the DASH diet?   The DASH diet requires no special foods and has no hard-to-follow recipes. Start by seeing how DASH compares with your current eating habits.   The DASH eating plan illustrated below is based on a diet of 2,000 calories a day. Your healthcare provider or a dietitian can help you determine how many calories a day you need. Most adults need somewhere between 1600 and 2800 calories a day. Serving sizes for different foods vary from 1/2 cup to 1 and 1/4 cups. Check product nutrition labels for serving sizes and the number of calories per serving.                      Number of        Examples of  Food Group      servings         serving size  ----------------------------------------------------------------    Grains and      7 to 8 per day   1 slice of bread  grain products                   1 cup ready-to-eat cold cereal                                   1/2 cup cooked rice, pasta,                                   or cereal    Vegetables      4 to 5 per day   1 cup raw leafy vegetable                                   1/2 cup cooked vegetable                                   6 ounces (oz) vegetable juice      Fruits          4 to 5 per day   1 medium fruit                                   1/4 cup dried fruit                                   1/2 cup fresh, frozen, or                                   canned fruit                                   6 oz fruit juice      Low-fat or      2 to 3 per day   8 oz milk  fat-free                         1 cup yogurt  dairy foods                      1 and 1/2 oz cheese    Lean meats,  poultry,        2 or fewer per   3 oz cooked lean meat,  or fish         day              skinless poultry, or fish    Nuts, seeds,    4 to 5 per week  1/3 cup or 1 and 1/2 oz nuts  and dry beans                    1 tablespoon or 1/2 oz seeds                                   1/2 cup cooked dry beans    Fats  and oils   2 to 3 per day   1 teaspoon soft margarine                                   1 tablespoon low-fat mayonnaise                                   2 tablespoons light salad                                   dressing                                   1 teaspoon vegetable oil    Sweets          5 per week       1 tablespoon sugar                                   1 tablespoon jelly or jam                                   1/2 oz jelly beans                                   8 oz lemonade  ----------------------------------------------------------------  Make changes gradually. Here are some suggestions that might help:   If you now eat 1 or 2 servings of vegetables a day, add a serving at lunch and another at dinner.   If you have not been eating fruit regularly, or have only juice at breakfast, add a serving to your meals or have it as a snack.   Drink milk or water with lunch or dinner instead of soda, sugar-sweetened tea, or alcohol. Choose low-fat (1%) or fat-free (nonfat) dairy products so that you are eating fewer calories and less saturated fat, total fat, and cholesterol.   Read food labels on margarines and salad dressings to choose products lowest in fat.   If you now eat large portions of meat, slowly cut back--by a half or a third at each meal. Limit meat to 6 ounces a day (two 3-ounce servings). Three to 4 ounces is about the size of a deck of cards.   Have 2 or more meatless meals each week. Increase servings of vegetables, rice, pasta, and beans in all meals. Try casseroles, pasta, and stir-palmer dishes, which have less meat and more vegetables, grains, and beans.   Use fruits canned in their own juice. Fresh fruits require little or no preparation. Dried fruits are a good choice to carry with you or to have ready in the car.   Try these snacks ideas: unsalted pretzels or nuts mixed with raisins, julita crackers, low-fat and fat-free yogurt or frozen yogurt, popcorn with no salt or butter added,  and raw vegetables.   Choose whole-grain foods to get more nutrients, including minerals and fiber. For example, choose whole-wheat bread, whole-grain cereals, or brown rice.   Use fresh, frozen, or no-salt-added canned vegetables.   Remember to also reduce the salt and sodium in your diet. Try to have no more than 2000 milligrams (mg) of sodium per day, with a goal of further reducing the sodium to 1500 mg per day. Three important ways to reduce sodium are:   Eat food products with reduced-sodium or no salt added.   Use less salt when you prepare foods and do not add salt to your food at the table.   Read food labels. Aim for foods that contain less than 5% of the daily value of sodium.   The DASH eating plan is not designed for weight loss. But it contains many lower-calorie foods, such as fruits and vegetables. You can make it lower in calories by replacing high-calorie foods with more fruits and vegetables. Some ideas to increase fruits and vegetables and decrease calories include:   Eat a medium apple instead of 4 shortbread cookies. You'll save 80 calories.   Eat 1/4 cup of dried apricots instead of a 2-ounce bag of pork rinds. You'll save 230 calories.   Have a hamburger that's 3 ounces instead of 6 ounces. Add a 1/2 cup serving of carrots and a 1/2 cup serving of spinach. You'll save more than 200 calories.   Instead of 5 ounces of chicken, have a stir palmer with 2 ounces of chicken and 1 and 1/2 cups of raw vegetables. Use just a small amount of vegetable oil. You'll save 50 calories.   Have a 1/2 cup serving of low-fat frozen yogurt instead of a 1-and-1/2-ounce chocolate bar. You'll save about 110 calories.   Use low-fat or fat-free condiments, such as fat-free salad dressings.   Eat smaller portions. Cut back gradually.   Use food labels to compare fat and calorie content in packaged foods. Items marked low fat or fat free may be lower in fat but not lower in calories than their regular versions.   Limit  foods with lots of added sugar, such as pies, flavored yogurts, candy bars, ice cream, sherbet, regular soft drinks, and fruit drinks.   Drink water or club soda instead of cola or other soda drinks.     For more information, see the National Heart, Lung, and Blood [place] Web site at: http://www.nhlbi.nih.gov/health/public/heart/hbp/dash/.     [place] right across the street from [facility] on [address]. appt at 5:15pm          Follow-ups after your visit        Additional Services     NEUROLOGY ADULT REFERRAL       Your provider has referred you to: [facility]: [facility] [phone]   [email or web address]  [facility]: Neurology [facility] [phone]   [email or web address]      Please be aware that coverage of these services is subject to the terms and limitations of your health insurance plan.  Call member services at your health plan with any benefit or coverage questions.      Please bring the following to your appointment:    >>   Any x-rays, CTs or MRIs which have been performed.  Contact the facility where they were done to arrange for  prior to your scheduled appointment.  Any new CT, MRI or other procedures ordered by your specialist must be performed at a [facility] facility or coordinated by your clinic's referral office.    >>   List of current medications   >>   This referral request   >>   Any documents/labs given to you for this referral                  Future tests that were ordered for you today     Open Future Orders        Priority Expected Expires Ordered    MR Brain w/o & w Contrast Routine  6/7/2019 6/7/2018            Who to contact     If you have questions or need follow up information about today's clinic visit or your schedule please contact [facility] directly at [phone].  Normal or non-critical lab and imaging results will be  "communicated to you by MyChart, letter or phone within 4 business days after the clinic has received the results. If you do not hear from us within 7 days, please contact the clinic through [facility] or phone. If you have a critical or abnormal lab result, we will notify you by phone as soon as possible.  Submit refill requests through [employer] or call your pharmacy and they will forward the refill request to us. Please allow 3 business days for your refill to be completed.          Additional Information About Your Visit        [employer] Information     [employer] lets you send messages to your doctor, view your test results, renew your prescriptions, schedule appointments and more. To sign up, go to [email or web address] . Click on \"Log in\" on the left side of the screen, which will take you to the Welcome page. Then click on \"Sign up Now\" on the right side of the page.     You will be asked to enter the access code listed below, as well as some personal information. Please follow the directions to create your username and password.     Your access code is: [email or web address]  Expires: 2018  1:01 PM     Your access code will  in 90 days. If you need help or a new code, please call your [place] clinic or [phone].        Care EveryWhere ID     This is your Care EveryWhere ID. This could be used by other organizations to access your [place] medical records  [id number]        Your Vitals Were     Pulse Temperature Respirations Height Pulse Oximetry BMI (Body Mass Index)    98 98.1  F (36.7  C) (Oral) 18 5' 3.25\" (1.607 m) 98% 41.02 kg/m2       Blood Pressure from Last 3 Encounters:   18 (!) 142/98   17 (!) 124/92   16 128/88    Weight from Last 3 Encounters:   18 233 lb 6.4 oz (105.9 kg)   17 230 lb (104.3 kg)   16 222 lb (100.7 kg)              We Performed the Following     Basic metabolic panel     CBC with platelets differential     NEUROLOGY ADULT REFERRAL     TSH WITH " FREE T4 REFLEX          Today's Medication Changes          These changes are accurate as of 6/7/18  1:39 PM.  If you have any questions, ask your nurse or doctor.               Start taking these medicines.        Dose/Directions    lisinopril 10 MG tablet   Commonly known as:  PRINIVIL/ZESTRIL   Used for:  Essential hypertension   Started by:  Katherin Palm MD        Dose:  10 mg   Take 1 tablet (10 mg) by mouth daily   Quantity:  90 tablet   Refills:  3            Where to get your medicines      These medications were sent to [facility] [place], [place] - [address]  [address], [facility] [address]     Phone:  [phone]     lisinopril 10 MG tablet                Primary Care Provider Office Phone # Fax #    Kiara Rehana Quintana, ABIOLA [facility] [phone] [address] [address]        Equal Access to Services     [facility]: Hadii ramesh ku hadasho Soomaali, waaxda luqadaha, qaybta kaalmada adeegyada, waxay severinoin hayalivian cat grere . So [facility] [place] [phone].    ATENCIÓN: Si habla español, tiene a garcia disposición servicios gratuitos de asistencia lingüística. Jim al [phone].    We comply with applicable federal civil rights laws and [place] laws. We do not discriminate on the basis of race, color, national origin, age, disability, sex, sexual orientation, or gender identity.            Thank you!     Thank you for choosing [facility]  for your care. Our goal is always to provide you with excellent care. Hearing back from our patients is one way we can continue to improve our services. Please take a few minutes to complete the written survey that you may receive in the mail after your visit with us. Thank you!             Your Updated Medication List - Protect others around you: Learn how to safely use, store and throw away your medicines at www.disposemymeds.org.          This list is accurate as of 6/7/18   1:39 PM.  Always use your most recent med list.                   Brand Name Dispense Instructions for use Diagnosis    * levothyroxine 125 MCG tablet    SYNTHROID/LEVOTHROID    45 tablet    TAKE ONE TABLET BY MOUTH EVERY OTHER DAY ALTERNATING  WITH  THE  137  MCG  TABLETS    Acquired hypothyroidism       * levothyroxine 137 MCG tablet    SYNTHROID/LEVOTHROID    45 tablet    TAKE ONE TABLET BY MOUTH EVERY OTHER DAY ALTERNATING  WITH  THE  125MCG  TABLETS    Acquired hypothyroidism       lisinopril 10 MG tablet    PRINIVIL/ZESTRIL    90 tablet    Take 1 tablet (10 mg) by mouth daily    Essential hypertension       omeprazole 20 MG CR capsule    priLOSEC    30 capsule    Take 1 capsule (20 mg) by mouth daily    Cough       prenatal multivitamin plus iron 27-0.8 MG Tabs per tablet      Take 1 tablet by mouth daily        PROBIOTIC DAILY PO           * Notice:  This list has 2 medication(s) that are the same as other medications prescribed for you. Read the directions carefully, and ask your doctor or other care provider to review them with you.

## 2018-06-07 NOTE — PROGRESS NOTES
SUBJECTIVE:   Eunice Barrientos is a 48 year old female who presents to clinic today for the following health issues:    Fuzzy vision, headaches, feeling off balance      Duration: 1 week    Intensity:  Moderate to  severe    Accompanying signs and symptoms: Macular degeneration of left eye(has vision loss). Intracranial shunt. Sharp stabbing pains on left side of head.    History (similar episodes/previous evaluation): None    Precipitating or alleviating factors: None    Therapies tried and outcome: None    Pt says she is seeing Floaters  Both eyes  Pt has Macular Degeneration left eye  Pt had a intracranial shunt placed in 2003  Due to a Blockage  She has 2 shunt  Infections in 2005-was in ICU for 8 weeks  Pt had a new shunt placed in 2005  Has not seen neurologist  for several years  Pt has a sharp stabbing pain on left side-Intermittent  This all started today and had it last week for 1   6 hours   No fever or chills  No neck stiffness  Patient  Has elevated Blood Pressure  She indicates that she is feeling well and denies any symptoms referable to her elevated blood pressure. Specifically denies chest pain, palpitations, dyspnea, orthopnea, PND or peripheral edema. Family history: positive for hypertension  Age at onset of elevated blood pressure:   Cardiovascular risk factors: hypertension and obesity  Use of agents associated with hypertension: none  History of renal disease: negative          Problem list and histories reviewed & adjusted, as indicated.  Additional history: as documented    Patient Active Problem List   Diagnosis     Hypothyroidism     Intracranial shunt     CARDIOVASCULAR SCREENING; LDL GOAL LESS THAN 130     Hypertension goal BP (blood pressure) < 140/90     Macular degeneration (senile) of retina     Past Surgical History:   Procedure Laterality Date     CHOLECYSTECTOMY       COLPOSCOPY CERVIX, BIOPSY CERVIX, ENDOCERVICAL CURETTAGE, COMBINED  2009    several, earliest was 1992      intracranial shunt       LEEP TX, CERVICAL  1995     MASTOIDECTOMY  2003     OOPHORECTOMY Left        Social History   Substance Use Topics     Smoking status: Never Smoker     Smokeless tobacco: Never Used     Alcohol use No     Family History   Problem Relation Age of Onset     DIABETES Maternal Grandmother      Hypertension Maternal Grandmother      CANCER Maternal Aunt      CEREBROVASCULAR DISEASE No family hx of      Thyroid Disease No family hx of      Glaucoma No family hx of      Macular Degeneration No family hx of          Current Outpatient Prescriptions   Medication Sig Dispense Refill     levothyroxine (SYNTHROID/LEVOTHROID) 125 MCG tablet TAKE ONE TABLET BY MOUTH EVERY OTHER DAY ALTERNATING  WITH  THE  137  MCG  TABLETS 45 tablet 3     levothyroxine (SYNTHROID/LEVOTHROID) 137 MCG tablet TAKE ONE TABLET BY MOUTH EVERY OTHER DAY ALTERNATING  WITH  THE  125MCG  TABLETS 45 tablet 3     lisinopril (PRINIVIL/ZESTRIL) 10 MG tablet Take 1 tablet (10 mg) by mouth daily 90 tablet 3     Prenatal Vit-Fe Fumarate-FA (PRENATAL MULTIVITAMIN  PLUS IRON) 27-0.8 MG TABS per tablet Take 1 tablet by mouth daily       Probiotic Product (PROBIOTIC DAILY PO)        omeprazole (PRILOSEC) 20 MG CR capsule Take 1 capsule (20 mg) by mouth daily (Patient not taking: Reported on 6/7/2018) 30 capsule 1     Allergies   Allergen Reactions     Terazol [Terconazole] Hives     Recent Labs   Lab Test  07/06/17   1249  07/01/16   0918 11/30/15  09/11/15   1018   LDL   --    --    --   98   HDL   --    --    --   51   TRIG   --    --    --   114   CR   --   0.66  1.06  0.74   GFRESTIMATED   --   >90  Non  GFR Calc    57*  86   GFRESTBLACK   --   >90   GFR Calc     --   >90   GFR Calc     POTASSIUM   --    --    --   4.7   TSH  1.18  2.87   --   1.76      BP Readings from Last 3 Encounters:   06/07/18 (!) 142/98   07/06/17 (!) 124/92   11/23/16 128/88    Wt Readings from Last 3 Encounters:  "  06/07/18 233 lb 6.4 oz (105.9 kg)   07/06/17 230 lb (104.3 kg)   11/23/16 222 lb (100.7 kg)                  Labs reviewed in EPIC    Reviewed and updated as needed this visit by clinical staff  Tobacco  Allergies  Meds       Reviewed and updated as needed this visit by Provider         ROS:  CONSTITUTIONAL: NEGATIVE for fever, chills, change in weight  INTEGUMENTARY/SKIN: NEGATIVE for worrisome rashes, moles or lesions  EYES: as above  ENT/MOUTH: NEGATIVE for ear, mouth and throat problems  RESP: NEGATIVE for significant cough or SOB  CV: NEGATIVE for chest pain, palpitations or peripheral edema  GI: NEGATIVE for nausea, abdominal pain, heartburn, or change in bowel habits  MUSCULOSKELETAL: NEGATIVE for significant arthralgias or myalgia  NEURO: POSITIVE for above and NEGATIVE for dizziness/lightheadedness, involuntary movements, gait disturbance, loss of consciousness, memory problems, numbness or tingling , paralysis , paresthesias , syncope and tremor   PSYCHIATRIC: NEGATIVE for changes in mood or affect    OBJECTIVE:     BP (!) 142/98  Pulse 98  Temp 98.1  F (36.7  C) (Oral)  Resp 18  Ht 5' 3.25\" (1.607 m)  Wt 233 lb 6.4 oz (105.9 kg)  SpO2 98%  BMI 41.02 kg/m2  Body mass index is 41.02 kg/(m^2).   Repeat /98  GENERAL: healthy, alert and no distress  EYES: Eyes grossly normal to inspection, PERRL and conjunctivae and sclerae normal  HENT: ear canals and TM's normal, nose and mouth without ulcers or lesions  NECK: no adenopathy, no asymmetry, masses, or scars and thyroid normal to palpation  RESP: lungs clear to auscultation - no rales, rhonchi or wheezes  CV: regular rate and rhythm, normal S1 S2, no S3 or S4, no murmur, click or rub, no peripheral edema and peripheral pulses strong  ABDOMEN: soft, nontender, no hepatosplenomegaly, no masses and bowel sounds normal  MS: no gross musculoskeletal defects noted, no edema  SKIN: no suspicious lesions or rashes  NEURO: Normal strength and tone, " sensory exam grossly normal, mentation intact, speech normal, cranial nerves 2-12 intact and DTR's normal and symmetric   PSYCH: mentation appears normal, affect normal/bright    Diagnostic Test Results:  Pending     ASSESSMENT/PLAN:         ICD-10-CM    1. Nonintractable episodic headache, unspecified headache type R51 MR Brain w/o & w Contrast     Basic metabolic panel     CBC with platelets differential     NEUROLOGY ADULT REFERRAL   2. Vitreous floaters of both eyes H43.393 TSH WITH FREE T4 REFLEX     MR Brain w/o & w Contrast     NEUROLOGY ADULT REFERRAL   3. Essential hypertension I10 lisinopril (PRINIVIL/ZESTRIL) 10 MG tablet   4. Macular degeneration (senile) of retina H35.30 NEUROLOGY ADULT REFERRAL   5. Intracranial shunt Z98.2 NEUROLOGY ADULT REFERRAL     We made a appointment with opthamology today at 1 PM but patient went down and cancelled This appointment   We have scheduled a MRI scan today at 5 PM  I did  discuss with patient the reason for eye appointment is to rule Out any eye issues Like retinal detachment etc  She has been advised importance of follow up   A referral also  has been Given to Neurology  Appointment scheduled for her to follow up  With PMD tomorrow  Advised start Lisinopril for BP  SEE EPIC care orders  The potential side effects of this medication have been discussed with the patient.  Call if any significant problems with these are experienced.  Follow up BP check 10 days and check Potassium  Advised DASH diet  Katherin Palm MD  [facility]

## 2018-06-07 NOTE — PROGRESS NOTES
SUBJECTIVE:   Eunice Barrientos is a 48 year old female who presents to clinic today for the following health issues:      Chief Complaint   Patient presents with     Eye Problem     Recheck per ADA / glb     Patient with history of intracranial shunt &  Macular degeneration presents for follow up of intermittent headache and visual floaters, blurred vision which began 1 week ago. Also related some mild, intermittent chest past. Patient was seen yesterday by Dr. Palm and was scheduled with ophthalmology and for a brain MRI- states she wasn't able to see Ophthalmology yesterday due to scheduling conflict, & rescheduled MRI for today as she didn't have a ride. Lisinopril was prescribed yesterday but she has yet to take this.      Problem list and histories reviewed & adjusted, as indicated.  Additional history: as documented    Patient Active Problem List   Diagnosis     Hypothyroidism     Intracranial shunt     CARDIOVASCULAR SCREENING; LDL GOAL LESS THAN 130     Hypertension goal BP (blood pressure) < 140/90     Macular degeneration (senile) of retina     Past Surgical History:   Procedure Laterality Date     CHOLECYSTECTOMY       COLPOSCOPY CERVIX, BIOPSY CERVIX, ENDOCERVICAL CURETTAGE, COMBINED  2009    several, earliest was 1992     intracranial shunt       LEEP TX, CERVICAL  1995     MASTOIDECTOMY  2003     OOPHORECTOMY Left        Social History   Substance Use Topics     Smoking status: Never Smoker     Smokeless tobacco: Never Used     Alcohol use No     Family History   Problem Relation Age of Onset     DIABETES Maternal Grandmother      Hypertension Maternal Grandmother      CANCER Maternal Aunt      CEREBROVASCULAR DISEASE No family hx of      Thyroid Disease No family hx of      Glaucoma No family hx of      Macular Degeneration No family hx of            Reviewed and updated as needed this visit by clinical staff       Reviewed and updated as needed this visit by Provider         ROS:  Constitutional,  "HEENT, cardiovascular, pulmonary, neuro systems are negative, except as otherwise noted.    OBJECTIVE:     BP (!) 130/98 (BP Location: Left arm, Patient Position: Chair, Cuff Size: Adult Large)  Pulse 67  Temp 97.8  F (36.6  C) (Oral)  Resp 20  Ht 5' 3.25\" (1.607 m)  Wt 230 lb (104.3 kg)  SpO2 98%  BMI 40.42 kg/m2  Body mass index is 40.42 kg/(m^2).  GENERAL: healthy, alert and no distress  EYES: Eyes grossly normal to inspection, PERRL and conjunctivae and sclerae normal  HENT: ear canals and TM's normal, nose and mouth without ulcers or lesions  NECK: no adenopathy, no asymmetry, masses, or scars and thyroid normal to palpation  RESP: lungs clear to auscultation - no rales, rhonchi or wheezes  CV: regular rate and rhythm, normal S1 S2, no S3 or S4, no murmur, click or rub, no peripheral edema and peripheral pulses strong    Diagnostic Test Results:  EKG- unremarkable    ASSESSMENT/PLAN:     1. Vitreous floaters of both eyes  Discussed with Dr. Rodarte, ophthalmology, and he will see patient immediately following our visit today as there is concern for retinal detachment.    2. Hypertension goal BP (blood pressure) < 140/90  1 dose Clonidine now, patient to  Lisinopril from pharmacy after visit today and take a dose right away.  - cloNIDine (CATAPRES) 0.1 MG tablet; Take 1 tablet (0.1 mg) by mouth once for 1 dose  Dispense: 1 tablet; Refill: 0    3. Atypical chest pain    - EKG 12-lead complete w/read - Clinics    4. Intracranial shunt  MRI to be done this afternoon. Then needs to schedule follow up with Neurology      Follow up with Ophthalmology, Neurology, see me in 2-3 weeks for hypertension    ABIOLA Burgess [facility]    "

## 2018-06-08 ENCOUNTER — OFFICE VISIT (OUTPATIENT)
Dept: OPHTHALMOLOGY | Facility: CLINIC | Age: 48
End: 2018-06-08
Payer: COMMERCIAL

## 2018-06-08 ENCOUNTER — OFFICE VISIT (OUTPATIENT)
Dept: FAMILY MEDICINE | Facility: CLINIC | Age: 48
End: 2018-06-08
Payer: COMMERCIAL

## 2018-06-08 ENCOUNTER — TRANSFERRED RECORDS (OUTPATIENT)
Dept: HEALTH INFORMATION MANAGEMENT | Facility: CLINIC | Age: 48
End: 2018-06-08

## 2018-06-08 VITALS
DIASTOLIC BLOOD PRESSURE: 98 MMHG | TEMPERATURE: 97.8 F | HEART RATE: 67 BPM | HEIGHT: 63 IN | WEIGHT: 230 LBS | BODY MASS INDEX: 40.75 KG/M2 | RESPIRATION RATE: 20 BRPM | OXYGEN SATURATION: 98 % | SYSTOLIC BLOOD PRESSURE: 130 MMHG

## 2018-06-08 DIAGNOSIS — Z98.2 INTRACRANIAL SHUNT: ICD-10-CM

## 2018-06-08 DIAGNOSIS — I10 HYPERTENSION GOAL BP (BLOOD PRESSURE) < 140/90: ICD-10-CM

## 2018-06-08 DIAGNOSIS — R07.89 ATYPICAL CHEST PAIN: ICD-10-CM

## 2018-06-08 DIAGNOSIS — H35.30 MACULAR DEGENERATION: Primary | ICD-10-CM

## 2018-06-08 DIAGNOSIS — H43.393 VITREOUS FLOATERS OF BOTH EYES: Primary | ICD-10-CM

## 2018-06-08 PROCEDURE — 99214 OFFICE O/P EST MOD 30 MIN: CPT | Performed by: NURSE PRACTITIONER

## 2018-06-08 PROCEDURE — 92134 CPTRZ OPH DX IMG PST SGM RTA: CPT | Performed by: OPHTHALMOLOGY

## 2018-06-08 PROCEDURE — 92002 INTRM OPH EXAM NEW PATIENT: CPT | Performed by: OPHTHALMOLOGY

## 2018-06-08 PROCEDURE — 93000 ELECTROCARDIOGRAM COMPLETE: CPT | Performed by: NURSE PRACTITIONER

## 2018-06-08 RX ORDER — CLONIDINE HYDROCHLORIDE 0.1 MG/1
0.1 TABLET ORAL ONCE
Qty: 1 TABLET | Refills: 0
Start: 2018-06-08 | End: 2018-12-10

## 2018-06-08 ASSESSMENT — VISUAL ACUITY
OD_PH_SC: 20/40-1
OS_SC: CF@ 1'
METHOD: SNELLEN - LINEAR
OD_SC: 20/70

## 2018-06-08 ASSESSMENT — TONOMETRY
OD_IOP_MMHG: 12
IOP_METHOD: APPLANATION
OS_IOP_MMHG: 11

## 2018-06-08 ASSESSMENT — SLIT LAMP EXAM - LIDS
COMMENTS: NORMAL
COMMENTS: NORMAL

## 2018-06-08 ASSESSMENT — CONF VISUAL FIELD
OD_NORMAL: 1
OS_NORMAL: 1
METHOD: COUNTING FINGERS

## 2018-06-08 ASSESSMENT — PAIN SCALES - GENERAL: PAINLEVEL: NO PAIN (0)

## 2018-06-08 ASSESSMENT — CUP TO DISC RATIO
OS_RATIO: 0.2
OD_RATIO: 0.0

## 2018-06-08 ASSESSMENT — EXTERNAL EXAM - RIGHT EYE: OD_EXAM: NORMAL

## 2018-06-08 ASSESSMENT — EXTERNAL EXAM - LEFT EYE: OS_EXAM: NORMAL

## 2018-06-08 NOTE — LETTER
6/8/2018         RE: Eunice Barrientos  [address]  [facility] [place] [address]        Dear Colleague,    Thank you for referring your patient, Eunice Barrientos, to the [facility]. Please see a copy of my visit note below.     Current Eye Medications:  none     Subjective:  Floaters started yesterday, also had some last week that stopped both eyes. Not having any flashes or curtain effect both eyes. Vision has been blurry and Hazey last 2 days right eye. No eye pain either eye. Eyes have been little dry for last 2 days.     Objective:  See Ophthalmology Exam.       Assessment:  No obvious acute ocular pathology in patient with hx of macular degeneration/dystrophy left eye > right eye and hx of CNS shunt.      Plan:  Possible posterior vitreous detachment (sudden onset large floater and/or flashing lights) both eyes discussed.  Return visit for refraction when available.  Will obtain baseline glaucoma OCT and retinal OCT today.  Matt Rodarte M.D.  [phone]             Again, thank you for allowing me to participate in the care of your patient.        Sincerely,        Matt Rodarte MD

## 2018-06-08 NOTE — LETTER
June 8, 2018      Eunice Barrientos  [address] [facility] [address]  [place] [address]        To Whom It May Concern:    Eunice Barrientos  was seen on 6/7/18 and 06/08/18.  Please excuse her until Monday due to illness.        Sincerely,        ABIOLA Burgess CNP

## 2018-06-08 NOTE — NURSING NOTE
The following medication was given @10:50am:     MEDICATION: Clonidine 0.1 mg  ROUTE: PO  SITE: mouth  DOSE: 0.1 mg  LOT #: [id number]  :  [employer]  EXPIRATION DATE:  08/2019  NDC# (44) [id number]    Syl Coe MA

## 2018-06-08 NOTE — PATIENT INSTRUCTIONS
Possible posterior vitreous detachment (sudden onset large floater and/or flashing lights) both eyes discussed.  Return visit for refraction when available.  Will obtain baseline glaucoma OCT and retinal OCT today.  Matt Rodarte M.D.  [phone]

## 2018-06-08 NOTE — MR AVS SNAPSHOT
"              After Visit Summary   6/8/2018    Eunice Barrientos    MRN: [id number]           Patient Information     Date Of Birth          1970        Visit Information        Provider Department      6/8/2018 12:45 PM Matt Rodarte MD [facility]        Today's Diagnoses     Macular degeneration (senile) of retina    -  1      Care Instructions    Possible posterior vitreous detachment (sudden onset large floater and/or flashing lights) both eyes discussed.  Return visit for refraction when available.  Will obtain baseline glaucoma OCT and retinal OCT today.  Matt Rodarte M.D.  [phone]            Follow-ups after your visit        Future tests that were ordered for you today     Open Future Orders        Priority Expected Expires Ordered    MR Brain w/o & w Contrast Routine  6/7/2019 6/7/2018            Who to contact     If you have questions or need follow up information about today's clinic visit or your schedule please contact [facility] directly at [phone].  Normal or non-critical lab and imaging results will be communicated to you by Frugotonhart, letter or phone within 4 business days after the clinic has received the results. If you do not hear from us within 7 days, please contact the clinic through [employer] or phone. If you have a critical or abnormal lab result, we will notify you by phone as soon as possible.  Submit refill requests through [employer]FS or call your pharmacy and they will forward the refill request to us. Please allow 3 business days for your refill to be completed.          Additional Information About Your Visit        [employer] Information     AeroFS lets you send messages to your doctor, view your test results, renew your prescriptions, schedule appointments and more. To sign up, go to [email or web address] . Click on \"Log in\" on the left side of the screen, which will take you to the Welcome page. Then click on \"Sign up Now\" on the right " side of the page.     You will be asked to enter the access code listed below, as well as some personal information. Please follow the directions to create your username and password.     Your access code is: [email or web address]  Expires: 2018  1:01 PM     Your access code will  in 90 days. If you need help or a new code, please call your [place] clinic or [phone].        Care EveryWhere ID     This is your Care EveryWhere ID. This could be used by other organizations to access your [place] medical records  [id number]         Blood Pressure from Last 3 Encounters:   18 (!) 130/98   18 (!) 142/98   17 (!) 124/92    Weight from Last 3 Encounters:   18 104.3 kg (230 lb)   18 105.9 kg (233 lb 6.4 oz)   17 104.3 kg (230 lb)              Today, you had the following     No orders found for display         Today's Medication Changes          These changes are accurate as of 18  1:02 PM.  If you have any questions, ask your nurse or doctor.               Start taking these medicines.        Dose/Directions    cloNIDine 0.1 MG tablet   Commonly known as:  CATAPRES   Used for:  Hypertension goal BP (blood pressure) < 140/90   Started by:  Kiara Quintana APRN CNP        Dose:  0.1 mg   Take 1 tablet (0.1 mg) by mouth once for 1 dose   Quantity:  1 tablet   Refills:  0            Where to get your medicines      Some of these will need a paper prescription and others can be bought over the counter.  Ask your nurse if you have questions.     You don't need a prescription for these medications     cloNIDine 0.1 MG tablet                Primary Care Provider Office Phone # Fax #    ABIOLA Burgess [phone] [address] [facility] [address] [place] [address]        Equal Access to Services     [facility] AH: Jose Manuel odomo Sobeck, waaxda luqadaha, qaybta kaalmada adeegyajhon, nathan greer . So [facility]  [phone].    ATENCIÓN: Si sloane watts, tiene a garcia disposición servicios gratuitos de asistencia lingüística. Jim caceres [phone].    We comply with applicable federal civil rights laws and [place] laws. We do not discriminate on the basis of race, color, national origin, age, disability, sex, sexual orientation, or gender identity.            Thank you!     Thank you for choosing [facility]  for your care. Our goal is always to provide you with excellent care. Hearing back from our patients is one way we can continue to improve our services. Please take a few minutes to complete the written survey that you may receive in the mail after your visit with us. Thank you!             Your Updated Medication List - Protect others around you: Learn how to safely use, store and throw away your medicines at www.disposemymeds.org.          This list is accurate as of 6/8/18  1:02 PM.  Always use your most recent med list.                   Brand Name Dispense Instructions for use Diagnosis    cloNIDine 0.1 MG tablet    CATAPRES    1 tablet    Take 1 tablet (0.1 mg) by mouth once for 1 dose    Hypertension goal BP (blood pressure) < 140/90       * levothyroxine 125 MCG tablet    SYNTHROID/LEVOTHROID    45 tablet    TAKE ONE TABLET BY MOUTH EVERY OTHER DAY ALTERNATING  WITH  THE  137  MCG  TABLETS    Acquired hypothyroidism       * levothyroxine 137 MCG tablet    SYNTHROID/LEVOTHROID    45 tablet    TAKE ONE TABLET BY MOUTH EVERY OTHER DAY ALTERNATING  WITH  THE  125MCG  TABLETS    Acquired hypothyroidism       lisinopril 10 MG tablet    PRINIVIL/ZESTRIL    90 tablet    Take 1 tablet (10 mg) by mouth daily    Essential hypertension       prenatal multivitamin plus iron 27-0.8 MG Tabs per tablet      Take 1 tablet by mouth daily        PROBIOTIC DAILY PO           * Notice:  This list has 2 medication(s) that are the same as other medications prescribed for you. Read the directions carefully, and ask your  doctor or other care provider to review them with you.

## 2018-06-08 NOTE — MR AVS SNAPSHOT
After Visit Summary   6/8/2018    Eunice Barrientos    MRN: [id number]           Patient Information     Date Of Birth          1970        Visit Information        Provider Department      6/8/2018 10:00 AM Kiara Quintana APRN [facility]        Today's Diagnoses     Vitreous floaters of both eyes    -  1    Hypertension goal BP (blood pressure) < 140/90        Atypical chest pain          Care Instructions    TODAY:  -Do the MRI  -See the eye doctor  -/start your Lisinopril    After today:  -Schedule the Neurology appointment  -See me in 2-3 weeks for the blood pressure    [facility]    If you have any questions regarding to your visit please contact your care team:       Team Red:   Clinic Hours Telephone Number   Dr. Dorota Qiuntana, NP   7am-7pm  Monday - Thursday   7am-5pm  Fridays  [phone]  (Appointment scheduling available 24/7)    Questions about your recent visit?   Team Line  [phone]   Urgent Care - [place] and [place] - 11am-9pm Monday-Friday Saturday-Sunday- 9am-5pm   [place] - 5pm-9pm Monday-Friday Saturday-Sunday- 9am-5pm  [phone] - [place]  [phone] - [place]       What options do I have for a visit other than an office visit? We offer electronic visits (e-visits) and telephone visits, when medically appropriate.  Please check with your medical insurance to see if these types of visits are covered, as you will be responsible for any charges that are not paid by your insurance.      You can use [employer] (secure electronic communication) to access to your chart, send your primary care provider a message, or make an appointment. Ask a team member how to get started.     For a price quote for your services, please call our Consumer Price Line at [phone] or our Imaging Cost estimation line at [phone] (for imaging tests).    Discharged by  "Syl Coe MA.            Follow-ups after your visit        Future tests that were ordered for you today     Open Future Orders        Priority Expected Expires Ordered    MR Brain w/o & w Contrast Routine  2019            Who to contact     If you have questions or need follow up information about today's clinic visit or your schedule please contact [facility] directly at [phone].  Normal or non-critical lab and imaging results will be communicated to you by [employer]hart, letter or phone within 4 business days after the clinic has received the results. If you do not hear from us within 7 days, please contact the clinic through [employer]hart or phone. If you have a critical or abnormal lab result, we will notify you by phone as soon as possible.  Submit refill requests through [employer] or call your pharmacy and they will forward the refill request to us. Please allow 3 business days for your refill to be completed.          Additional Information About Your Visit        [employer]to Networks Information     [employer] lets you send messages to your doctor, view your test results, renew your prescriptions, schedule appointments and more. To sign up, go to [email or web address] . Click on \"Log in\" on the left side of the screen, which will take you to the Welcome page. Then click on \"Sign up Now\" on the right side of the page.     You will be asked to enter the access code listed below, as well as some personal information. Please follow the directions to create your username and password.     Your access code is: [email or web address]  Expires: 2018  1:01 PM     Your access code will  in 90 days. If you need help or a new code, please call your [place] clinic or [phone].        Care EveryWhere ID     This is your Care EveryWhere ID. This could be used by other organizations to access your [place] medical records  [id number]        Your Vitals Were     Pulse Temperature Respirations Height Pulse " "Oximetry BMI (Body Mass Index)    67 97.8  F (36.6  C) (Oral) 20 5' 3.25\" (1.607 m) 98% 40.42 kg/m2       Blood Pressure from Last 3 Encounters:   06/08/18 (!) 150/106   06/07/18 (!) 142/98   07/06/17 (!) 124/92    Weight from Last 3 Encounters:   06/08/18 230 lb (104.3 kg)   06/07/18 233 lb 6.4 oz (105.9 kg)   07/06/17 230 lb (104.3 kg)              We Performed the Following     EKG 12-lead complete w/read - Clinics          Today's Medication Changes          These changes are accurate as of 6/8/18 11:37 AM.  If you have any questions, ask your nurse or doctor.               Start taking these medicines.        Dose/Directions    cloNIDine 0.1 MG tablet   Commonly known as:  CATAPRES   Used for:  Hypertension goal BP (blood pressure) < 140/90   Started by:  Kiara Quintana APRN CNP        Dose:  0.1 mg   Take 1 tablet (0.1 mg) by mouth once for 1 dose   Quantity:  1 tablet   Refills:  0            Where to get your medicines      Some of these will need a paper prescription and others can be bought over the counter.  Ask your nurse if you have questions.     You don't need a prescription for these medications     cloNIDine 0.1 MG tablet                Primary Care Provider Office Phone # Fax #    ABIOLA Burgess [phone]       [address] [facility] [address]        Equal Access to Services     [facility] AH: Hadii ramesh ku hadasho Soomaali, waaxda luqadaha, qaybta kaalmada adeegyada, waxay berhane lutz. So [facility] [phone].    ATENCIÓN: Si habla español, tiene a garcia disposición servicios gratuitos de asistencia lingüística. Llame al [phone].    We comply with applicable federal civil rights laws and [place] laws. We do not discriminate on the basis of race, color, national origin, age, disability, sex, sexual orientation, or gender identity.            Thank you!     Thank you for choosing [facility]  for your care. Our " goal is always to provide you with excellent care. Hearing back from our patients is one way we can continue to improve our services. Please take a few minutes to complete the written survey that you may receive in the mail after your visit with us. Thank you!             Your Updated Medication List - Protect others around you: Learn how to safely use, store and throw away your medicines at www.disposemymeds.org.          This list is accurate as of 6/8/18 11:37 AM.  Always use your most recent med list.                   Brand Name Dispense Instructions for use Diagnosis    cloNIDine 0.1 MG tablet    CATAPRES    1 tablet    Take 1 tablet (0.1 mg) by mouth once for 1 dose    Hypertension goal BP (blood pressure) < 140/90       * levothyroxine 125 MCG tablet    SYNTHROID/LEVOTHROID    45 tablet    TAKE ONE TABLET BY MOUTH EVERY OTHER DAY ALTERNATING  WITH  THE  137  MCG  TABLETS    Acquired hypothyroidism       * levothyroxine 137 MCG tablet    SYNTHROID/LEVOTHROID    45 tablet    TAKE ONE TABLET BY MOUTH EVERY OTHER DAY ALTERNATING  WITH  THE  125MCG  TABLETS    Acquired hypothyroidism       lisinopril 10 MG tablet    PRINIVIL/ZESTRIL    90 tablet    Take 1 tablet (10 mg) by mouth daily    Essential hypertension       prenatal multivitamin plus iron 27-0.8 MG Tabs per tablet      Take 1 tablet by mouth daily        PROBIOTIC DAILY PO           * Notice:  This list has 2 medication(s) that are the same as other medications prescribed for you. Read the directions carefully, and ask your doctor or other care provider to review them with you.

## 2018-06-08 NOTE — PROGRESS NOTES
Current Eye Medications:  none     Subjective:  Floaters started yesterday, also had some last week that stopped both eyes. Not having any flashes or curtain effect both eyes. Vision has been blurry and Hazey last 2 days right eye. No eye pain either eye. Eyes have been little dry for last 2 days.     Objective:  See Ophthalmology Exam.       Assessment:  No obvious acute ocular pathology in patient with hx of macular degeneration/dystrophy left eye > right eye and hx of CNS shunt.      Plan:  Possible posterior vitreous detachment (sudden onset large floater and/or flashing lights) both eyes discussed.  Return visit for refraction when available.  Will obtain baseline glaucoma OCT and retinal OCT today.  Matt Rodarte M.D.  [phone]

## 2018-06-08 NOTE — PATIENT INSTRUCTIONS
TODAY:  -Do the MRI  -See the eye doctor  -/start your Lisinopril    After today:  -Schedule the Neurology appointment  -See me in 2-3 weeks for the blood pressure    [facility]    If you have any questions regarding to your visit please contact your care team:       Team Red:   Clinic Hours Telephone Number   Dr. Dorota Quintana, NP   7am-7pm  Monday - Thursday   7am-5pm  Fridays  [phone]  (Appointment scheduling available 24/7)    Questions about your recent visit?   Team Line  [phone]   Urgent Care - [place] and [facility] - 11am-9pm Monday-Friday Saturday-Sunday- 9am-5pm   [place] - 5pm-9pm Monday-Friday Saturday-Sunday- 9am-5pm  [phone] - [place]  [phone] - [place]       What options do I have for a visit other than an office visit? We offer electronic visits (e-visits) and telephone visits, when medically appropriate.  Please check with your medical insurance to see if these types of visits are covered, as you will be responsible for any charges that are not paid by your insurance.      You can use [email or web address] (secure electronic communication) to access to your chart, send your primary care provider a message, or make an appointment. Ask a team member how to get started.     For a price quote for your services, please call our Consumer Price Line at [phone] or our Imaging Cost estimation line at [phone] (for imaging tests).    Discharged by Syl Coe MA.

## 2018-06-09 PROBLEM — H35.30 MACULAR DEGENERATION (SENILE) OF RETINA: Status: RESOLVED | Noted: 2017-07-06 | Resolved: 2018-06-09

## 2018-06-09 PROBLEM — H35.30 MACULAR DEGENERATION: Status: ACTIVE | Noted: 2018-06-09

## 2018-06-11 ENCOUNTER — TELEPHONE (OUTPATIENT)
Dept: FAMILY MEDICINE | Facility: CLINIC | Age: 48
End: 2018-06-11

## 2018-06-11 NOTE — TELEPHONE ENCOUNTER
I do not have results from her brain MRI yet- please contact [facility] for a copy of the report. Dr. Palm ordered it.    Also please see if ophthalmology recommended/agrees to time off work for this?    Kiara Quintana, CNP

## 2018-06-11 NOTE — TELEPHONE ENCOUNTER
Reason for call:  Patient reporting a symptom - missed work may need a note    Symptom or request: eye floaters, vision hazy - see notes from last week.    Duration (how long have symptoms been present): 1 week    Have you been treated for this before? Yes    Additional comments: Please call back to discuss/does she need to be seen.    Phone Number patient can be reached at:  Home number on file [phone] (home)    Best Time:  ASAP    Can we leave a detailed message on this number:  YES    Call taken on 6/11/2018 at 10:38 AM by Josseline Han

## 2018-06-11 NOTE — LETTER
June 11, 2018      Eunice Barrientos  [address]  [place] [address]        To Whom It May Concern:    Eunice Barrientos was seen on 6/8/18.  Please excuse her 06/11/18 due to illness.        Sincerely,        ABIOLA Burgess CNP

## 2018-06-11 NOTE — TELEPHONE ENCOUNTER
Please let patient know her brain MRI is normal. I can write her a note for missing today, but any further time off will need to be approved by Ophthalmology.  Kiara Quintana, CNP

## 2018-06-11 NOTE — TELEPHONE ENCOUNTER
Spoke to patient and informed her of below message from provider. Letter to be picked up by patient. MA informed her to bring form of ID.  Elvira CENTENO CMA ([facility])

## 2018-06-12 NOTE — TELEPHONE ENCOUNTER
Reason for Call:  Form, our goal is to have forms completed with 72 hours, however, some forms may require a visit or additional information.    Type of letter, form or note:  employer    Who is the form from?: Patient    Where did the form come from: Form from MD    What clinic location was the form placed at?: [facility]    Where the form was placed: letter left at     What number is listed as a contact on the form?: N/A       Additional comments: Patient wants form faxed to employer attention: Brigida Real at [employer]. Fax to [phone] instead of picking up at .    Call taken on 6/12/2018 at 12:22 PM by Corina Rees

## 2018-06-12 NOTE — TELEPHONE ENCOUNTER
Called and spoke with the patient to let her know her request was completed. She understood. Natasha Ahmadi,

## 2018-11-17 ENCOUNTER — HEALTH MAINTENANCE LETTER (OUTPATIENT)
Age: 48
End: 2018-11-17

## 2018-11-26 NOTE — PROGRESS NOTES
SUBJECTIVE:   Eunice Barrientos is a 48 year old female who presents to clinic today for the following health issues:      URINARY TRACT SYMPTOMS      Duration: on and off x 2 weeks    Description  urgency, odor, hesitancy and retention    Intensity:  moderate    Accompanying signs and symptoms:  Fever/chills: YES- chills  Flank pain YES  Nausea and vomiting: YES  Vaginal symptoms: discharge and odor  Abdominal/Pelvic Pain: YES    History  History of frequent UTI's: no, has had 2 and a couple of vaginal bacteria infections  History of kidney stones: no   Sexually Active: YES  Possibility of pregnancy: Yes    Precipitating or alleviating factors: None    Therapies tried and outcome: none       Just found out long-term boyfriend was unfaithful and was abusing cocaine- no IV drugs.    Was dizzy on the Lisinopril so stopped. Still having floaters in vision- is unsure if she should see ophthalmology or retinal specialist? Has yet to establish with neurology for her intracranial shunt.      Problem list and histories reviewed & adjusted, as indicated.  Additional history: as documented    Patient Active Problem List   Diagnosis     Hypothyroidism     Intracranial shunt     CARDIOVASCULAR SCREENING; LDL GOAL LESS THAN 130     Hypertension goal BP (blood pressure) < 140/90     Macular degeneration, dry, mod, od; dry with foveal scar, os     Past Surgical History:   Procedure Laterality Date     CHOLECYSTECTOMY       COLPOSCOPY CERVIX, BIOPSY CERVIX, ENDOCERVICAL CURETTAGE, COMBINED  2009    several, earliest was 1992     intracranial shunt       LEEP TX, CERVICAL  1995     MASTOIDECTOMY  2003     OOPHORECTOMY Left        Social History     Tobacco Use     Smoking status: Never Smoker     Smokeless tobacco: Never Used   Substance Use Topics     Alcohol use: No     Family History   Problem Relation Age of Onset     Diabetes Maternal Grandmother      Hypertension Maternal Grandmother      Cancer Maternal Aunt       "Cerebrovascular Disease No family hx of      Thyroid Disease No family hx of      Glaucoma No family hx of      Macular Degeneration No family hx of            Reviewed and updated as needed this visit by clinical staff       Reviewed and updated as needed this visit by Provider         ROS:  Constitutional, HEENT, cardiovascular, pulmonary, gi and gu systems are negative, except as otherwise noted.    OBJECTIVE:     /90 (BP Location: Left arm, Patient Position: Chair, Cuff Size: Adult Large)   Pulse 78   Temp 98.5  F (36.9  C) (Oral)   Ht 1.607 m (5' 3.25\")   Wt 112 kg (247 lb)   LMP 11/20/2018   SpO2 99%   BMI 43.41 kg/m    Body mass index is 43.41 kg/m .  GENERAL: healthy, alert and no distress  RESP: lungs clear to auscultation - no rales, rhonchi or wheezes  CV: regular rate and rhythm, normal S1 S2, no S3 or S4, no murmur, click or rub, no peripheral edema and peripheral pulses strong  ABDOMEN: soft, nontender, no hepatosplenomegaly, no masses and bowel sounds normal  PSYCH: mentation appears normal, affect normal/bright    Diagnostic Test Results:  Results for orders placed or performed in visit on 12/10/18   UA reflex to Microscopic and Culture   Result Value Ref Range    Color Urine Yellow     Appearance Urine Clear     Glucose Urine Negative NEG^Negative mg/dL    Bilirubin Urine Negative NEG^Negative    Ketones Urine Negative NEG^Negative mg/dL    Specific Gravity Urine 1.020 1.003 - 1.035    Blood Urine Negative NEG^Negative    pH Urine 6.0 5.0 - 7.0 pH    Protein Albumin Urine Negative NEG^Negative mg/dL    Urobilinogen Urine 0.2 0.2 - 1.0 EU/dL    Nitrite Urine Negative NEG^Negative    Leukocyte Esterase Urine Negative NEG^Negative    Source Midstream Urine    Beta HCG Qual, Urine - [facility] ([id number])   Result Value Ref Range    Beta HCG Qual IFA Urine Negative NEG^Negative      Wet prep   Result Value Ref Range    Specimen Description Vagina     Wet Prep No Trichomonas seen     " Wet Prep Clue cells seen (A)     Wet Prep No yeast seen    Chlamydia trachomatis PCR   Result Value Ref Range    Specimen Description Vagina     Chlamydia Trachomatis PCR Negative NEG^Negative   Neisseria gonorrhoeae PCR   Result Value Ref Range    Specimen Descrip Vagina     N Gonorrhea PCR Negative NEG^Negative       ASSESSMENT/PLAN:     1. BV (bacterial vaginosis)  Start Metrogel. Avoid scented soaps or feminine products, no soap between the lips of the labia, wear white cotton underwear (no thongs), avoid sitting in wet clothes such as work-out clothes or swimsuits, no bubble baths.    - Wet prep  - metroNIDAZOLE (METROGEL) 0.75 % vaginal gel; Place 1 applicator (5 g) vaginally At Bedtime for 5 days  Dispense: 70 g; Refill: 0    2. Dysuria    - UA reflex to Microscopic and Culture    3. Unprotected sex  Patient declines HIV, Hep B, Hep C, Syphilis testing.  - Chlamydia trachomatis PCR  - Neisseria gonorrhoeae PCR  - Beta HCG Qual, Urine - [facility] ([id number])    4. Intracranial shunt  Establish with Neurosurgery here at [facility]  - NEUROSURGERY REFERRAL    5. Hypertension goal BP (blood pressure) < 140/90  Stay off Lisinopril due to symptomatic hypotension- start hydrochlorothiazide instead. Follow-up 2 weeks to recheck  - hydrochlorothiazide (HYDRODIURIL) 12.5 MG tablet; Take 1 tablet (12.5 mg) by mouth daily  Dispense: 30 tablet; Refill: 1    6. Macular degeneration, unspecified laterality, unspecified type  I talked to Dr. Stewart- she recommends patient follow up with Dr. Rodarte first, as may not need to see retinal specialist.    7. Need for prophylactic vaccination and inoculation against influenza    - FLU VACCINE, SPLIT VIRUS, IM (QUADRIVALENT) [03581]- >3 YRS  - Vaccine Administration, Initial [84631]    See Patient Instructions    ABIOLA Burgess [facility]

## 2018-12-10 ENCOUNTER — OFFICE VISIT (OUTPATIENT)
Dept: FAMILY MEDICINE | Facility: CLINIC | Age: 48
End: 2018-12-10
Payer: COMMERCIAL

## 2018-12-10 VITALS
TEMPERATURE: 98.5 F | SYSTOLIC BLOOD PRESSURE: 118 MMHG | OXYGEN SATURATION: 99 % | HEART RATE: 78 BPM | WEIGHT: 247 LBS | HEIGHT: 63 IN | DIASTOLIC BLOOD PRESSURE: 90 MMHG | BODY MASS INDEX: 43.77 KG/M2

## 2018-12-10 DIAGNOSIS — R30.0 DYSURIA: ICD-10-CM

## 2018-12-10 DIAGNOSIS — H35.30 MACULAR DEGENERATION, UNSPECIFIED LATERALITY, UNSPECIFIED TYPE: ICD-10-CM

## 2018-12-10 DIAGNOSIS — Z72.51 UNPROTECTED SEX: ICD-10-CM

## 2018-12-10 DIAGNOSIS — Z23 NEED FOR PROPHYLACTIC VACCINATION AND INOCULATION AGAINST INFLUENZA: ICD-10-CM

## 2018-12-10 DIAGNOSIS — B96.89 BV (BACTERIAL VAGINOSIS): Primary | ICD-10-CM

## 2018-12-10 DIAGNOSIS — Z98.2 INTRACRANIAL SHUNT: ICD-10-CM

## 2018-12-10 DIAGNOSIS — I10 HYPERTENSION GOAL BP (BLOOD PRESSURE) < 140/90: ICD-10-CM

## 2018-12-10 DIAGNOSIS — N76.0 BV (BACTERIAL VAGINOSIS): Primary | ICD-10-CM

## 2018-12-10 LAB
ALBUMIN UR-MCNC: NEGATIVE MG/DL
APPEARANCE UR: CLEAR
BETA HCG QUAL IFA URINE: NEGATIVE
BILIRUB UR QL STRIP: NEGATIVE
COLOR UR AUTO: YELLOW
GLUCOSE UR STRIP-MCNC: NEGATIVE MG/DL
HGB UR QL STRIP: NEGATIVE
KETONES UR STRIP-MCNC: NEGATIVE MG/DL
LEUKOCYTE ESTERASE UR QL STRIP: NEGATIVE
NITRATE UR QL: NEGATIVE
PH UR STRIP: 6 PH (ref 5–7)
SOURCE: NORMAL
SP GR UR STRIP: 1.02 (ref 1–1.03)
SPECIMEN SOURCE: ABNORMAL
UROBILINOGEN UR STRIP-ACNC: 0.2 EU/DL (ref 0.2–1)
WET PREP SPEC: ABNORMAL

## 2018-12-10 PROCEDURE — 84703 CHORIONIC GONADOTROPIN ASSAY: CPT | Performed by: NURSE PRACTITIONER

## 2018-12-10 PROCEDURE — 99214 OFFICE O/P EST MOD 30 MIN: CPT | Mod: 25 | Performed by: NURSE PRACTITIONER

## 2018-12-10 PROCEDURE — 90686 IIV4 VACC NO PRSV 0.5 ML IM: CPT | Performed by: NURSE PRACTITIONER

## 2018-12-10 PROCEDURE — 87591 N.GONORRHOEAE DNA AMP PROB: CPT | Performed by: NURSE PRACTITIONER

## 2018-12-10 PROCEDURE — 87210 SMEAR WET MOUNT SALINE/INK: CPT | Performed by: NURSE PRACTITIONER

## 2018-12-10 PROCEDURE — 81003 URINALYSIS AUTO W/O SCOPE: CPT | Performed by: NURSE PRACTITIONER

## 2018-12-10 PROCEDURE — 90471 IMMUNIZATION ADMIN: CPT | Performed by: NURSE PRACTITIONER

## 2018-12-10 PROCEDURE — 87491 CHLMYD TRACH DNA AMP PROBE: CPT | Performed by: NURSE PRACTITIONER

## 2018-12-10 RX ORDER — HYDROCHLOROTHIAZIDE 12.5 MG/1
12.5 TABLET ORAL DAILY
Qty: 30 TABLET | Refills: 1 | Status: SHIPPED | OUTPATIENT
Start: 2018-12-10 | End: 2019-02-10

## 2018-12-10 RX ORDER — METRONIDAZOLE 7.5 MG/G
1 GEL VAGINAL AT BEDTIME
Qty: 70 G | Refills: 0 | Status: SHIPPED | OUTPATIENT
Start: 2018-12-10 | End: 2019-06-12

## 2018-12-10 ASSESSMENT — PAIN SCALES - GENERAL: PAINLEVEL: NO PAIN (0)

## 2018-12-10 ASSESSMENT — MIFFLIN-ST. JEOR: SCORE: 1733.47

## 2018-12-10 NOTE — PATIENT INSTRUCTIONS
-Schedule a follow up with Dr. Rodarte for your eyes  -Schedule with Dr. Taveras, the Neurosurgeon here at [facility], for your shunt

## 2018-12-10 NOTE — PROGRESS NOTES
Injectable Influenza Immunization Documentation    1.  Is the person to be vaccinated sick today?   No No    2. Does the person to be vaccinated have an allergy to a component   of the vaccine?   No  Egg Allergy Algorithm Link    3. Has the person to be vaccinated ever had a serious reaction   to influenza vaccine in the past?   No    4. Has the person to be vaccinated ever had Guillain-Barré syndrome?   No    Form completed by Syl Coe MA

## 2018-12-11 LAB
C TRACH DNA SPEC QL NAA+PROBE: NEGATIVE
N GONORRHOEA DNA SPEC QL NAA+PROBE: NEGATIVE
SPECIMEN SOURCE: NORMAL
SPECIMEN SOURCE: NORMAL

## 2018-12-14 ENCOUNTER — TELEPHONE (OUTPATIENT)
Dept: FAMILY MEDICINE | Facility: CLINIC | Age: 48
End: 2018-12-14

## 2018-12-14 NOTE — TELEPHONE ENCOUNTER
No, I would not expect this side effect from these medications. She needs to be seen- I am booked today. Can offer her appt with another provider or Urgent Care.   Kiara Quintana, CNP

## 2018-12-14 NOTE — TELEPHONE ENCOUNTER
Called and spoke to patient and gave information below.   Pt verbalized understanding and states she will go to .     Tiana Arce RN

## 2018-12-14 NOTE — TELEPHONE ENCOUNTER
Reason for Call:  Other call back    Detailed comments: Patient stating that she is having severe cramps and stabbing pains and vomiting as a possible side effect from a medication she was given on Monday. Please advise.     Phone Number Patient can be reached at: Cell number on file:    Telephone Information:   Mobile [phone]       Best Time: any     Can we leave a detailed message on this number? YES    Call taken on 12/14/2018 at 7:07 AM by Eliceo Carrillo

## 2018-12-14 NOTE — TELEPHONE ENCOUNTER
Called and spoke with patient.   Patient was prescribed Metrogel and hydrochlorathiazide on 12/10/18.  Reports that yesterday she started having severe cramping/stabbing pains from belly button down. Reports these pains come and go, are not constant.  Pain caused her to have emesis x1 yesterday.  Took Metrogel for 3 nights, did not take any last night d/t pain and states pain is easing up a little bit.  Has been taking hydrochlorathiazide as prescribed but hasn't taken her dose yet today.  Reports she is a little constipated but had a small BM yesterday but didn't have a BM for a couple days before that.   Reports she'd had the sweats but hasn't taken her temperature.   Pt denies CP or SOB.     Patient wondering if these symptoms can be related to medications?  Please advise.     Tiana Arce RN

## 2019-02-10 DIAGNOSIS — I10 HYPERTENSION GOAL BP (BLOOD PRESSURE) < 140/90: ICD-10-CM

## 2019-02-11 RX ORDER — HYDROCHLOROTHIAZIDE 12.5 MG/1
TABLET ORAL
Qty: 30 TABLET | Refills: 0 | Status: SHIPPED | OUTPATIENT
Start: 2019-02-11 | End: 2019-06-12

## 2019-02-11 NOTE — TELEPHONE ENCOUNTER
Spoke to patient and scheduled appointment for 2/14/2019.  Elvira CENTENO CMA ([facility])

## 2019-02-11 NOTE — TELEPHONE ENCOUNTER
Patient over due for BP follow up with me- has no show'd me twice. Please help her schedule and then ok for #30 day supply.  Kiara Quintana, CNP

## 2019-02-11 NOTE — TELEPHONE ENCOUNTER
"Routing refill request to provider for review/approval because:  Labs out of range:  BP    Requested Prescriptions   Pending Prescriptions Disp Refills     hydrochlorothiazide (HYDRODIURIL) 12.5 MG tablet [Pharmacy Med Name: HYDROCHLOROT 12.5MG  TAB] 30 tablet 1     Sig: TAKE 1 TABLET BY MOUTH ONCE DAILY    Diuretics (Including Combos) Protocol Failed - 2/11/2019  7:44 AM       Failed - Blood pressure under 140/90 in past 12 months    BP Readings from Last 3 Encounters:   12/10/18 118/90   06/08/18 (!) 130/98   06/07/18 (!) 142/98                Passed - Recent (12 mo) or future (30 days) visit within the authorizing provider's specialty    Patient had office visit in the last 12 months or has a visit in the next 30 days with authorizing provider or within the authorizing provider's specialty.  See \"Patient Info\" tab in inbasket, or \"Choose Columns\" in Meds & Orders section of the refill encounter.             Passed - Medication is active on med list       Passed - Patient is age 18 or older       Passed - No active pregancy on record       Passed - Normal serum creatinine on file in past 12 months    Recent Labs   Lab Test 06/07/18  1350   CR 0.84             Passed - Normal serum potassium on file in past 12 months    Recent Labs   Lab Test 06/07/18  1350   POTASSIUM 4.1                   Passed - Normal serum sodium on file in past 12 months    Recent Labs   Lab Test 06/07/18  1350                Passed - No positive pregnancy test in past 12 months        Tiana Arce RN  "

## 2019-03-04 ENCOUNTER — TELEPHONE (OUTPATIENT)
Dept: FAMILY MEDICINE | Facility: CLINIC | Age: 49
End: 2019-03-04

## 2019-03-04 NOTE — TELEPHONE ENCOUNTER
Panel Management Review      Patient has the following on her problem list:     Hypertension   Last three blood pressure readings:  BP Readings from Last 3 Encounters:   12/10/18 118/90   06/08/18 (!) 130/98   06/07/18 (!) 142/98     Blood pressure: FAILED    HTN Guidelines:  Age 18-59 BP range:  Less than 140/90  Age 60-85 with Diabetes:  Less than 140/90  Age 60-85 without Diabetes:  less than 150/90      Composite cancer screening  Chart review shows that this patient is due/due soon for the following None  Summary:    Patient is due/failing the following:   BP CHECK    Action needed:   Patient needs office visit for Blood Pressure check.    Type of outreach:    Sent letter.    Questions for provider review:    None                                                                                                                                    Syl Coe MA

## 2019-03-04 NOTE — LETTER
March 4, 2019          Eunice Barrientos,  [address]   [place] [address]        Dear Eunice Barrientos      Monitoring and managing your preventative and chronic health conditions are very important to us. Our records indicate that you have not scheduled for a Blood Pressure Check which was recommended by Kiara Quintana.      If you have received your health care elsewhere, please call the clinic so the information can be documented in your chart.    Please call [phone] or message us through your [facility] account to schedule an appointment or provide information for your chart.     Feel free to contact us if you have any questions or concerns!    I look forward to seeing you and working with you on your health care needs.     Sincerely,       Your [facility] Team/glb

## 2019-05-06 DIAGNOSIS — E03.9 ACQUIRED HYPOTHYROIDISM: ICD-10-CM

## 2019-05-06 RX ORDER — LEVOTHYROXINE SODIUM 125 UG/1
TABLET ORAL
Qty: 45 TABLET | Refills: 0 | Status: SHIPPED | OUTPATIENT
Start: 2019-05-06 | End: 2019-08-06

## 2019-05-06 RX ORDER — LEVOTHYROXINE SODIUM 137 UG/1
TABLET ORAL
Qty: 45 TABLET | Refills: 0 | Status: SHIPPED | OUTPATIENT
Start: 2019-05-06 | End: 2019-08-06

## 2019-06-12 ENCOUNTER — OFFICE VISIT (OUTPATIENT)
Dept: FAMILY MEDICINE | Facility: CLINIC | Age: 49
End: 2019-06-12
Payer: COMMERCIAL

## 2019-06-12 ENCOUNTER — ANCILLARY PROCEDURE (OUTPATIENT)
Dept: GENERAL RADIOLOGY | Facility: CLINIC | Age: 49
End: 2019-06-12
Attending: NURSE PRACTITIONER
Payer: COMMERCIAL

## 2019-06-12 VITALS
HEIGHT: 63 IN | OXYGEN SATURATION: 98 % | SYSTOLIC BLOOD PRESSURE: 140 MMHG | TEMPERATURE: 98.8 F | DIASTOLIC BLOOD PRESSURE: 104 MMHG | BODY MASS INDEX: 43.05 KG/M2 | WEIGHT: 243 LBS | HEART RATE: 70 BPM

## 2019-06-12 DIAGNOSIS — Z98.2 INTRACRANIAL SHUNT: ICD-10-CM

## 2019-06-12 DIAGNOSIS — G89.29 CHRONIC LEFT-SIDED LOW BACK PAIN WITH RIGHT-SIDED SCIATICA: ICD-10-CM

## 2019-06-12 DIAGNOSIS — E66.01 MORBID OBESITY (H): ICD-10-CM

## 2019-06-12 DIAGNOSIS — M54.41 CHRONIC LEFT-SIDED LOW BACK PAIN WITH RIGHT-SIDED SCIATICA: ICD-10-CM

## 2019-06-12 DIAGNOSIS — M54.41 CHRONIC LEFT-SIDED LOW BACK PAIN WITH RIGHT-SIDED SCIATICA: Primary | ICD-10-CM

## 2019-06-12 DIAGNOSIS — E03.9 ACQUIRED HYPOTHYROIDISM: ICD-10-CM

## 2019-06-12 DIAGNOSIS — G89.29 CHRONIC LEFT-SIDED LOW BACK PAIN WITH RIGHT-SIDED SCIATICA: Primary | ICD-10-CM

## 2019-06-12 DIAGNOSIS — Z12.31 ENCOUNTER FOR SCREENING MAMMOGRAM FOR BREAST CANCER: ICD-10-CM

## 2019-06-12 DIAGNOSIS — I10 HYPERTENSION GOAL BP (BLOOD PRESSURE) < 140/90: ICD-10-CM

## 2019-06-12 PROCEDURE — 72100 X-RAY EXAM L-S SPINE 2/3 VWS: CPT

## 2019-06-12 PROCEDURE — 99214 OFFICE O/P EST MOD 30 MIN: CPT | Performed by: NURSE PRACTITIONER

## 2019-06-12 RX ORDER — HYDROCHLOROTHIAZIDE 12.5 MG/1
12.5 TABLET ORAL DAILY
Qty: 30 TABLET | Refills: 0 | Status: SHIPPED | OUTPATIENT
Start: 2019-06-12 | End: 2019-07-21

## 2019-06-12 RX ORDER — NAPROXEN 500 MG/1
500 TABLET ORAL 2 TIMES DAILY WITH MEALS
Qty: 60 TABLET | Refills: 1 | Status: SHIPPED | OUTPATIENT
Start: 2019-06-12 | End: 2020-10-13

## 2019-06-12 RX ORDER — CYCLOBENZAPRINE HCL 10 MG
10 TABLET ORAL 3 TIMES DAILY PRN
Qty: 30 TABLET | Refills: 1 | Status: SHIPPED | OUTPATIENT
Start: 2019-06-12 | End: 2020-07-22

## 2019-06-12 ASSESSMENT — MIFFLIN-ST. JEOR: SCORE: 1710.33

## 2019-06-12 ASSESSMENT — PAIN SCALES - GENERAL: PAINLEVEL: EXTREME PAIN (9)

## 2019-06-12 NOTE — PATIENT INSTRUCTIONS
If you are still having back pain in 2 weeks and your blood pressure is controlled, let me know and I'll send in a Medrol Dosepak (oral steroids) for your back.
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
## 2019-06-13 PROBLEM — M54.41 CHRONIC LEFT-SIDED LOW BACK PAIN WITH RIGHT-SIDED SCIATICA: Status: ACTIVE | Noted: 2019-06-13

## 2019-06-13 PROBLEM — G89.29 CHRONIC LEFT-SIDED LOW BACK PAIN WITH RIGHT-SIDED SCIATICA: Status: ACTIVE | Noted: 2019-06-13

## 2019-06-13 NOTE — RESULT ENCOUNTER NOTE
Please call the patient with these results:    Eunice, your x-ray shows stable hardware in the low back. The level above, L4-L5, does show some degeneration/possible disc disease. Please continue with the plan discussed in clinic.    Kiara Quintana, CNP

## 2019-06-14 ENCOUNTER — TELEPHONE (OUTPATIENT)
Dept: FAMILY MEDICINE | Facility: CLINIC | Age: 49
End: 2019-06-14

## 2019-06-14 NOTE — TELEPHONE ENCOUNTER
Notes recorded by Estephanie Corea RN on 6/14/2019 at 9:45 AM CDT  Message left for patient to call the RN hotline # at [phone]    Estephanie Corea RN - BC    ------    Notes recorded by Kiara Quintana APRN CNP on 6/14/2019 at 8:49 AM CDT  Essentially this is the start of some wear and tear to the disc that cushions the vertebrae in the spine. Similar to what she had surgery for at L5-S1. It is common for the discs at the levels close to the surgery site to develop problems.    Kiara Quintana CNP    ------    Notes recorded by Elvira Ray RN on 6/13/2019 at 4:56 PM CDT  Spoke with pt. Gave her provider's message as written. She would like to know more about disc disease. What is this? Agrees to follow current plan of care. Please advise.     Elvira Ray RN  [facility]    ------    Notes recorded by Kiara Quintana APRN CNP on 6/13/2019 at 1:07 PM CDT  Please call the patient with these results:    Eunice, your x-ray shows stable hardware in the low back. The level above, L4-L5, does show some degeneration/possible disc disease. Please continue with the plan discussed in clinic.    Kiara Quintana CNP

## 2019-06-21 NOTE — TELEPHONE ENCOUNTER
Please send letter with results and info below to pt. Thanks.    Elvira Ray RN  [facility]

## 2019-07-02 ENCOUNTER — ANCILLARY PROCEDURE (OUTPATIENT)
Dept: MAMMOGRAPHY | Facility: CLINIC | Age: 49
End: 2019-07-02
Payer: COMMERCIAL

## 2019-07-02 ENCOUNTER — ALLIED HEALTH/NURSE VISIT (OUTPATIENT)
Dept: FAMILY MEDICINE | Facility: CLINIC | Age: 49
End: 2019-07-02

## 2019-07-02 VITALS — DIASTOLIC BLOOD PRESSURE: 88 MMHG | SYSTOLIC BLOOD PRESSURE: 130 MMHG

## 2019-07-02 DIAGNOSIS — I10 HYPERTENSION GOAL BP (BLOOD PRESSURE) < 140/90: Primary | ICD-10-CM

## 2019-07-02 DIAGNOSIS — E03.9 ACQUIRED HYPOTHYROIDISM: ICD-10-CM

## 2019-07-02 DIAGNOSIS — I10 HYPERTENSION GOAL BP (BLOOD PRESSURE) < 140/90: ICD-10-CM

## 2019-07-02 DIAGNOSIS — Z12.31 VISIT FOR SCREENING MAMMOGRAM: ICD-10-CM

## 2019-07-02 LAB
ANION GAP SERPL CALCULATED.3IONS-SCNC: 7 MMOL/L (ref 3–14)
BUN SERPL-MCNC: 16 MG/DL (ref 7–30)
CALCIUM SERPL-MCNC: 8.6 MG/DL (ref 8.5–10.1)
CHLORIDE SERPL-SCNC: 107 MMOL/L (ref 94–109)
CO2 SERPL-SCNC: 25 MMOL/L (ref 20–32)
CREAT SERPL-MCNC: 0.7 MG/DL (ref 0.52–1.04)
GFR SERPL CREATININE-BSD FRML MDRD: >90 ML/MIN/{1.73_M2}
GLUCOSE SERPL-MCNC: 87 MG/DL (ref 70–99)
POTASSIUM SERPL-SCNC: 3.9 MMOL/L (ref 3.4–5.3)
SODIUM SERPL-SCNC: 139 MMOL/L (ref 133–144)
TSH SERPL DL<=0.005 MIU/L-ACNC: 1.31 MU/L (ref 0.4–4)

## 2019-07-02 PROCEDURE — 84443 ASSAY THYROID STIM HORMONE: CPT | Performed by: NURSE PRACTITIONER

## 2019-07-02 PROCEDURE — 77067 SCR MAMMO BI INCL CAD: CPT | Mod: TC

## 2019-07-02 PROCEDURE — 80048 BASIC METABOLIC PNL TOTAL CA: CPT | Performed by: NURSE PRACTITIONER

## 2019-07-02 PROCEDURE — 36415 COLL VENOUS BLD VENIPUNCTURE: CPT | Performed by: NURSE PRACTITIONER

## 2019-07-02 PROCEDURE — 99207 ZZC NO CHARGE NURSE ONLY: CPT | Performed by: NURSE PRACTITIONER

## 2019-07-02 NOTE — PROGRESS NOTES
Eunice Barrientos was evaluated at [facility] on July 2, 2019 at which time her blood pressure was:    BP Readings from Last 3 Encounters:   07/02/19 130/88   06/12/19 (!) 140/104   12/10/18 118/90     Pulse Readings from Last 3 Encounters:   06/12/19 70   12/10/18 78   06/08/18 67       Reviewed lifestyle modifications for blood pressure control and reduction: including making healthy food choices, managing weight, getting regular exercise, smoking cessation, reducing alcohol consumption, monitoring blood pressure regularly.     Symptoms: None    BP Goal:< 140/90 mmHg    BP Assessment:  BP at goal    Potential Reasons for BP too high: NA - Not applicable    BP Follow-Up Plan: Recheck BP in 6 months at pharmacy    Recommendation to Provider: Patient has been taking medication, still recommended to continue to keep appt for tomorrow.     Note completed by: Thank you,  Chanda Stratton, PharmD  [facility]  [phone]

## 2019-07-21 DIAGNOSIS — I10 HYPERTENSION GOAL BP (BLOOD PRESSURE) < 140/90: ICD-10-CM

## 2019-07-24 RX ORDER — HYDROCHLOROTHIAZIDE 12.5 MG/1
TABLET ORAL
Qty: 30 TABLET | Refills: 3 | Status: SHIPPED | OUTPATIENT
Start: 2019-07-24 | End: 2019-12-11

## 2019-08-02 ENCOUNTER — NURSE TRIAGE (OUTPATIENT)
Dept: NURSING | Facility: CLINIC | Age: 49
End: 2019-08-02

## 2019-08-03 NOTE — TELEPHONE ENCOUNTER
"\"I was sleeping and right side chest pain woke me up about 45 minutes ago, it's like a stabbing pain on the right and it's not going away. I do have a hx of blood clots.\" Per pt denies other sx, breathing in does make her pain worse. Triage and advised ER.  Nicol Casanova RN [facility]        Reason for Disposition    History of prior \"blood clot\" in leg or lungs (i.e., deep vein thrombosis, pulmonary embolism)    Additional Information    Negative: Followed a chest injury    Negative: Severe difficulty breathing (e.g., struggling for each breath, speaks in single words)    Negative: Difficult to awaken or acting confused (e.g., disoriented, slurred speech)    Negative: Shock suspected (e.g., cold/pale/clammy skin, too weak to stand, low BP, rapid pulse)    Negative: [1] Chest pain lasts > 5 minutes AND [2] history of heart disease  (i.e., heart attack, bypass surgery, angina, angioplasty, CHF; not just a heart murmur)    Negative: [1] Chest pain lasts > 5 minutes AND [2] described as crushing, pressure-like, or heavy    Negative: [1] Chest pain lasts > 5 minutes AND [2] age > 50    Negative: [1] Chest pain lasts > 5 minutes AND [2] age > 30 AND [3] at least one cardiac risk factor (i.e., hypertension, diabetes, obesity, smoker or strong family history of heart disease)    Negative: [1] Chest pain lasts > 5 minutes AND [2] not relieved with nitroglycerin    Negative: Passed out (i.e., lost consciousness, collapsed and was not responding)    Negative: Heart beating < 50 beats per minute OR > 140 beats per minute    Negative: Visible sweat on face or sweat dripping down face    Negative: Sounds like a life-threatening emergency to the triager    Negative: SEVERE chest pain    Negative: [1] Intermittent  chest pain or \"angina\" AND [2] increasing in severity or frequency  (Exception: pains lasting a few seconds)    Negative: Pain also present in shoulder(s) or arm(s) or jaw  (Exception: pain is clearly " made worse by movement)    Negative: Difficulty breathing    Negative: Cocaine use within last 3 days    Protocols used: CHEST PAIN-A-AH

## 2019-08-05 ENCOUNTER — TRANSFERRED RECORDS (OUTPATIENT)
Dept: HEALTH INFORMATION MANAGEMENT | Facility: CLINIC | Age: 49
End: 2019-08-05

## 2019-08-05 ENCOUNTER — OFFICE VISIT (OUTPATIENT)
Dept: FAMILY MEDICINE | Facility: CLINIC | Age: 49
End: 2019-08-05
Payer: COMMERCIAL

## 2019-08-05 ENCOUNTER — NURSE TRIAGE (OUTPATIENT)
Dept: FAMILY MEDICINE | Facility: CLINIC | Age: 49
End: 2019-08-05

## 2019-08-05 VITALS
RESPIRATION RATE: 16 BRPM | HEIGHT: 63 IN | OXYGEN SATURATION: 98 % | DIASTOLIC BLOOD PRESSURE: 88 MMHG | BODY MASS INDEX: 43.23 KG/M2 | HEART RATE: 87 BPM | WEIGHT: 244 LBS | TEMPERATURE: 98.2 F | SYSTOLIC BLOOD PRESSURE: 118 MMHG

## 2019-08-05 DIAGNOSIS — Z86.711 HISTORY OF PULMONARY EMBOLISM: ICD-10-CM

## 2019-08-05 DIAGNOSIS — B37.31 YEAST INFECTION OF THE VAGINA: ICD-10-CM

## 2019-08-05 DIAGNOSIS — E66.01 MORBID OBESITY (H): ICD-10-CM

## 2019-08-05 DIAGNOSIS — R07.9 CHEST PAIN, UNSPECIFIED TYPE: Primary | ICD-10-CM

## 2019-08-05 DIAGNOSIS — N89.8 VAGINAL DISCHARGE: ICD-10-CM

## 2019-08-05 LAB
SPECIMEN SOURCE: ABNORMAL
WET PREP SPEC: ABNORMAL

## 2019-08-05 PROCEDURE — 93000 ELECTROCARDIOGRAM COMPLETE: CPT | Performed by: FAMILY MEDICINE

## 2019-08-05 PROCEDURE — 87210 SMEAR WET MOUNT SALINE/INK: CPT | Performed by: FAMILY MEDICINE

## 2019-08-05 PROCEDURE — 99214 OFFICE O/P EST MOD 30 MIN: CPT | Performed by: FAMILY MEDICINE

## 2019-08-05 RX ORDER — FLUCONAZOLE 150 MG/1
150 TABLET ORAL ONCE
Qty: 1 TABLET | Refills: 0 | Status: SHIPPED | OUTPATIENT
Start: 2019-08-05 | End: 2019-08-05

## 2019-08-05 ASSESSMENT — MIFFLIN-ST. JEOR: SCORE: 1714.87

## 2019-08-05 NOTE — PROGRESS NOTES
Subjective     Eunice Barrientos is a 49 year old female who presents to clinic today for the following health issues:    HPI   CHEST PAIN     Onset: Friday night (8/2/19) woke up from sleep    Description:   Location:  right side  Character: sharp pain comes and goes  Radiation: none  Duration:Intermittent    Intensity: moderate, severe    Progression of Symptoms:  improving    Accompanying Signs & Symptoms:  Shortness of breath: no   Sweating: no   Nausea/vomiting: no   Lightheadedness: no  Palpitations: no  Fever/Chills: no  Cough: YES- a little  Heartburn: no     History:   Family history of heart disease no   Tobacco use: no     Precipitating factors:   Worse with exertion: no   Worse with deep breaths :  maybe   Related to food: no     Alleviating factors:  None       Therapies Tried and outcome: None    1. Patient states she did have a previous blood clot about 10 years ago, that felt similar. She also states that she did have a previous infection (2x) in shunt in 2005.  Pt also has Vaginal discharge which is Itchy  No odor  No pelvic pain  LMP 2 weeks ago  Patient Active Problem List   Diagnosis     Hypothyroidism     Intracranial shunt     CARDIOVASCULAR SCREENING; LDL GOAL LESS THAN 130     Hypertension goal BP (blood pressure) < 140/90     Macular degeneration, dry, mod, od; dry with foveal scar, os     Morbid obesity (H)     Chronic left-sided low back pain with right-sided sciatica     Past Surgical History:   Procedure Laterality Date     CHOLECYSTECTOMY       COLPOSCOPY CERVIX, BIOPSY CERVIX, ENDOCERVICAL CURETTAGE, COMBINED  2009    several, earliest was 1992     FUSION LUMBAR ANTERIOR ONE LEVEL  2013     intracranial shunt       LEEP TX, CERVICAL  1995     MASTOIDECTOMY  2003     OOPHORECTOMY Left        Social History     Tobacco Use     Smoking status: Never Smoker     Smokeless tobacco: Never Used   Substance Use Topics     Alcohol use: No     Family History   Problem Relation Age of Onset      Diabetes Maternal Grandmother      Hypertension Maternal Grandmother      Cancer Maternal Aunt      Cerebrovascular Disease No family hx of      Thyroid Disease No family hx of      Glaucoma No family hx of      Macular Degeneration No family hx of          Current Outpatient Medications   Medication Sig Dispense Refill     fluconazole (DIFLUCAN) 150 MG tablet Take 1 tablet (150 mg) by mouth once for 1 dose 1 tablet 0     hydrochlorothiazide (HYDRODIURIL) 12.5 MG tablet TAKE 1 TABLET BY MOUTH ONCE DAILY 30 tablet 3     levothyroxine (SYNTHROID/LEVOTHROID) 125 MCG tablet TAKE 1 TABLET BY MOUTH EVERY OTHER DAY ALTERNATING WITH 137 MCG TABLETS 45 tablet 0     levothyroxine (SYNTHROID/LEVOTHROID) 137 MCG tablet TAKE 1 TABLET BY MOUTH EVERY OTHER DAY ALTERNATING WITH 125 MCG TABLETS 45 tablet 0     naproxen (NAPROSYN) 500 MG tablet Take 1 tablet (500 mg) by mouth 2 times daily (with meals) 60 tablet 1     Prenatal Vit-Fe Fumarate-FA (PRENATAL MULTIVITAMIN  PLUS IRON) 27-0.8 MG TABS per tablet Take 1 tablet by mouth daily       cyclobenzaprine (FLEXERIL) 10 MG tablet Take 1 tablet (10 mg) by mouth 3 times daily as needed for muscle spasms (Patient not taking: Reported on 8/5/2019) 30 tablet 1     Allergies   Allergen Reactions     Terazol [Terconazole] Hives     Recent Labs   Lab Test 07/02/19  1104 06/07/18  1350  09/11/15  1018   LDL  --   --   --  98   HDL  --   --   --  51   TRIG  --   --   --  114   CR 0.70 0.84   < > 0.74   GFRESTIMATED >90 73   < > 86   GFRESTBLACK >90 88   < > >90   GFR Calc     POTASSIUM 3.9 4.1  --  4.7   TSH 1.31 2.48   < > 1.76    < > = values in this interval not displayed.      BP Readings from Last 3 Encounters:   08/05/19 118/88   07/02/19 130/88   06/12/19 (!) 140/104    Wt Readings from Last 3 Encounters:   08/05/19 110.7 kg (244 lb)   06/12/19 110.2 kg (243 lb)   12/10/18 112 kg (247 lb)                      Reviewed and updated as needed this visit by  "Provider  Allergies         Review of Systems   ROS COMP: CONSTITUTIONAL: NEGATIVE for fever, chills, change in weight  ENT/MOUTH: NEGATIVE for ear, mouth and throat problems  RESP: NEGATIVE for significant cough or SOB  CV: as above  : normal menstrual cycles  Rest of the ROS is Negative except see above and Problem list [stable]        Objective    /88   Pulse 87   Temp 98.2  F (36.8  C) (Oral)   Resp 16   Ht 1.607 m (5' 3.25\")   Wt 110.7 kg (244 lb)   LMP 07/22/2019 (Approximate)   SpO2 98%   Breastfeeding? No   BMI 42.88 kg/m    Body mass index is 42.88 kg/m .  Physical Exam   GENERAL: healthy, alert and no distress  NECK: no adenopathy, no asymmetry, masses, or scars and thyroid normal to palpation  RESP: lungs clear to auscultation - no rales, rhonchi or wheezes  CV: regular rate and rhythm, normal S1 S2, no S3 or S4, no murmur, click or rub, no peripheral edema and peripheral pulses strong  ABDOMEN: soft, nontender, no hepatosplenomegaly, no masses and bowel sounds normal  MS: no gross musculoskeletal defects noted, no edema  SKIN: no suspicious lesions or rashes  PSYCH: mentation appears normal, affect normal/bright  Tenderness to palpation Right chest wall area    Diagnostic Test Results:  Labs reviewed in Epic  CT chest done Today is negative for PE        Assessment & Plan     1. Chest pain, unspecified type  PE vs Musculoskeletal  Pt ahs tenderness to palpation  Take NSAID otc  Follow up 1 week if not better/sooner if worse    - EKG 12-lead complete w/read - Clinics  - CT Chest Pulmonary Embolism w Contrast; Future    2. Vaginal discharge  Pending   - Wet prep    3. Yeast infection of the vagina  Advised   - fluconazole (DIFLUCAN) 150 MG tablet; Take 1 tablet (150 mg) by mouth once for 1 dose  Dispense: 1 tablet; Refill: 0    4. Morbid obesity (H)      5. History of pulmonary embolism            No follow-ups on file.    Katherin Palm MD  [facility]        "

## 2019-08-05 NOTE — TELEPHONE ENCOUNTER
Reason for Disposition    Pain also present in shoulder(s) or arm(s) or jaw    Difficulty breathing     Pt reports it hurts sometimes to take a deep breath    History of prior 'blood clot' in leg or lungs (i.e., deep vein thrombosis, pulmonary embolism)    Additional Information    Negative: Severe difficulty breathing (e.g., struggling for each breath, speaks in single words)    Negative: Passed out (i.e., fainted, collapsed and was not responding)    Negative: Chest pain lasting longer than 5 minutes and ANY of the following:* Over 50 years old* Over 30 years old and at least one cardiac risk factor (i.e., high blood pressure, diabetes, high cholesterol, obesity, smoker or strong family history of heart disease)* Pain is crushing, pressure-like, or heavy * Took nitroglycerin and chest pain was not relieved* History of heart disease (i.e., angina, heart attack, bypass surgery, angioplasty, CHF)    Negative: Visible sweat on face or sweat dripping down face    Negative: Sounds like a life-threatening emergency to the triager    Negative: Followed an injury to chest    Negative: SEVERE chest pain    Negative: Intermittent mild chest pain lasting a few seconds each time    Patient wants to be seen    Negative: All other patients with chest pain    Negative: Intermittent chest pains persist > 3 days    Negative: Fever > 100.5 F (38.1 C)    Chest pain lasting longer than 5 minutes    Negative: Intermittent chest pain and pain has been increasing in severity or frequency    Negative: Dizziness or lightheadedness    Negative: Coughing up blood    Negative: Patient sounds very sick or weak to the triager    Negative: Cocaine use within last 3 days    Negative: Recent illness requiring prolonged bed rest (i.e., immobilization)    Negative: Hip or leg fracture in past 2 months (e.g, or had cast on leg or ankle)    Negative: Major surgery in the past month    Negative: Recent long-distance travel with prolonged time in car,  bus, plane, or train (i.e., within past 2 weeks; 6 or more hours duration)    Negative: Heart beating irregularly or very rapidly    Protocols used: CHEST PAIN-A-[place]

## 2019-08-05 NOTE — Clinical Note
Call ptCT chest negative for PEPain MusculokeletalTake advil otcFollow up 1 week if not better/sooner if worse

## 2019-08-05 NOTE — TELEPHONE ENCOUNTER
Reason for call:  Symptom   Symptom or request: chest pain    Duration (how long have symptoms been present): started 7 pm Friday night  Have you been treated for this before? Yes    Additional comments: Routing to RN for triaging.     Phone number to reach patient:  Cell number on file:    Telephone Information:   Mobile [phone]       Best Time:  no    Can we leave a detailed message on this number?  NO

## 2019-08-05 NOTE — TELEPHONE ENCOUNTER
Spoke with pt. She spoke with a nurse on 8/2 and was advised to go to the ER. She took the weekend off from work and took it easy around home. States she didn't go to the ER because it didn't get any worse. Has had the chest pain since 8/2. Rates pain a 4-5/10 at this time. At first it was constant. Now is there, but not as hard as it was. She has had previous infections in her shunt. When she had that infection the chest pain was in the middle. Did not want to go to the ER. When she positions herself feels like there is something in there.  No SOB. Hurts sometimes when she takes a deep breath in or if she places herself into a different position. Pain is also off to her right shoulder. Had some dizziness on Friday, but this is nothing new. Advised pt to go to the ER as they can do the appropriate testing needed. We may end up sending her to the ER if she comes here. Pt declined and requested to be seen in office today or tomorrow. Appt scheduled for today.    Elvira Ray RN  [facility]

## 2019-08-06 ENCOUNTER — TELEPHONE (OUTPATIENT)
Dept: FAMILY MEDICINE | Facility: CLINIC | Age: 49
End: 2019-08-06

## 2019-08-06 DIAGNOSIS — E03.9 ACQUIRED HYPOTHYROIDISM: ICD-10-CM

## 2019-08-06 NOTE — TELEPHONE ENCOUNTER
Call pt   CT chest negative for PE   Pain Musculokeletal   Take advil otc   Follow up 1 week if not better/sooner if worse     Called and spoke with patient & notified of provider's results as written.   Verbalizes understanding.  Patient requesting a letter to go back to work tomorrow. States work needs this letter stating it is OK.  Letter printed, needs signature by Dr. Palm. In provider's box.    Tiana Samuel RN

## 2019-08-06 NOTE — TELEPHONE ENCOUNTER
Huddled with Dr. Palm.   OK to return to work. Letter signed and at  for pick-up.     Left detailed message for patient that letter is available at  for pick-up.   Advised to call RN Hotline if questions.     Tiana Samuel RN

## 2019-08-06 NOTE — LETTER
[address] [place] [address]  Phone: [phone]    August 6, 2019        Eunice Barrientos  [address] [facility] [address]          To whom it may concern:    RE: Eunice Barrientos    Patient may return to work on 8/7/2019 with the following: No restrictions    Please contact me for questions or concerns.      Sincerely,        Katherin Palm MD

## 2019-08-08 RX ORDER — LEVOTHYROXINE SODIUM 125 UG/1
TABLET ORAL
Qty: 45 TABLET | Refills: 1 | Status: SHIPPED | OUTPATIENT
Start: 2019-08-08 | End: 2020-02-25

## 2019-08-08 RX ORDER — LEVOTHYROXINE SODIUM 137 UG/1
TABLET ORAL
Qty: 45 TABLET | Refills: 1 | Status: SHIPPED | OUTPATIENT
Start: 2019-08-08 | End: 2020-02-25

## 2019-12-11 DIAGNOSIS — I10 HYPERTENSION GOAL BP (BLOOD PRESSURE) < 140/90: ICD-10-CM

## 2019-12-11 RX ORDER — HYDROCHLOROTHIAZIDE 12.5 MG/1
TABLET ORAL
Qty: 30 TABLET | Refills: 3 | Status: SHIPPED | OUTPATIENT
Start: 2019-12-11 | End: 2020-05-15

## 2020-02-23 DIAGNOSIS — E03.9 ACQUIRED HYPOTHYROIDISM: ICD-10-CM

## 2020-02-25 RX ORDER — LEVOTHYROXINE SODIUM 137 UG/1
TABLET ORAL
Qty: 45 TABLET | Refills: 1 | Status: SHIPPED | OUTPATIENT
Start: 2020-02-25 | End: 2020-05-15

## 2020-02-25 RX ORDER — LEVOTHYROXINE SODIUM 125 UG/1
TABLET ORAL
Qty: 45 TABLET | Refills: 1 | Status: SHIPPED | OUTPATIENT
Start: 2020-02-25 | End: 2020-09-23

## 2020-03-09 ENCOUNTER — TELEPHONE (OUTPATIENT)
Dept: OPHTHALMOLOGY | Facility: CLINIC | Age: 50
End: 2020-03-09

## 2020-03-09 NOTE — TELEPHONE ENCOUNTER
Pt called reporting she  Started getting this eye dryness like pain in her right  Eye and her vision seems blurrier also. I  Suggested to pt she  some artificial tears and use  3 to 4 times a day. Pt has hx of  CNS shunts and a macular scar in her left eye. I made appt for pt to see Dr. Rodarte tomorrow.

## 2020-05-13 DIAGNOSIS — I10 HYPERTENSION GOAL BP (BLOOD PRESSURE) < 140/90: ICD-10-CM

## 2020-05-13 DIAGNOSIS — E03.9 ACQUIRED HYPOTHYROIDISM: ICD-10-CM

## 2020-05-15 RX ORDER — LEVOTHYROXINE SODIUM 137 UG/1
TABLET ORAL
Qty: 45 TABLET | Refills: 0 | Status: SHIPPED | OUTPATIENT
Start: 2020-05-15 | End: 2020-09-23

## 2020-05-15 RX ORDER — HYDROCHLOROTHIAZIDE 12.5 MG/1
TABLET ORAL
Qty: 30 TABLET | Refills: 0 | Status: SHIPPED | OUTPATIENT
Start: 2020-05-15 | End: 2020-07-22

## 2020-06-11 DIAGNOSIS — I10 HYPERTENSION GOAL BP (BLOOD PRESSURE) < 140/90: ICD-10-CM

## 2020-06-11 NOTE — LETTER
Tamela 15, 2020          Eunice Barrientos,  [address] [facility] [address]            Dear Eunice Barrientos      Your provider has not sent you a refill for hydroCHLOROthiazide 12.5 MG Oral Tablet because you are due for an appointment for further refills. We are currently only able to see select in person visits. We ask that you schedule a telephone visit, video visit or evisit (through [employer]) with your provider.  Please contact the clinic to schedule an appointment for further refills.     Sincerely,       Your [facility]/

## 2020-06-12 RX ORDER — HYDROCHLOROTHIAZIDE 12.5 MG/1
TABLET ORAL
Qty: 30 TABLET | Refills: 0 | OUTPATIENT
Start: 2020-06-12

## 2020-06-12 NOTE — TELEPHONE ENCOUNTER
Called patient. Male answered and took a message for patient to call clinic. Please help schedule virtual appointment for cyclobenzaprine if patient returns call.  Elvira CENTENO CMA ([facility])

## 2020-06-12 NOTE — TELEPHONE ENCOUNTER
"Routing refill request to provider for review/approval because:  Caryn given x1 and patient did not follow up, please advise    Requested Prescriptions   Pending Prescriptions Disp Refills     hydrochlorothiazide (HYDRODIURIL) 12.5 MG tablet [Pharmacy Med Name: hydroCHLOROthiazide 12.5 MG Oral Tablet] 30 tablet 0     Sig: Take 1 tablet by mouth once daily       Diuretics (Including Combos) Protocol Passed - 6/12/2020  6:59 AM        Passed - Blood pressure under 140/90 in past 12 months     BP Readings from Last 3 Encounters:   08/05/19 118/88   07/02/19 130/88   06/12/19 (!) 140/104                 Passed - Recent (12 mo) or future (30 days) visit within the authorizing provider's specialty     Patient has had an office visit with the authorizing provider or a provider within the authorizing providers department within the previous 12 mos or has a future within next 30 days. See \"Patient Info\" tab in inbasket, or \"Choose Columns\" in Meds & Orders section of the refill encounter.              Passed - Medication is active on med list        Passed - Patient is age 18 or older        Passed - No active pregancy on record        Passed - Normal serum creatinine on file in past 12 months     Recent Labs   Lab Test 07/02/19  1104   CR 0.70              Passed - Normal serum potassium on file in past 12 months     Recent Labs   Lab Test 07/02/19  1104   POTASSIUM 3.9                    Passed - Normal serum sodium on file in past 12 months     Recent Labs   Lab Test 07/02/19  1104                 Passed - No positive pregnancy test in past 12 months           Tiana Samuel RN  "

## 2020-07-22 ENCOUNTER — VIRTUAL VISIT (OUTPATIENT)
Dept: FAMILY MEDICINE | Facility: CLINIC | Age: 50
End: 2020-07-22
Payer: COMMERCIAL

## 2020-07-22 DIAGNOSIS — E66.01 MORBID OBESITY (H): ICD-10-CM

## 2020-07-22 DIAGNOSIS — I10 HYPERTENSION GOAL BP (BLOOD PRESSURE) < 140/90: ICD-10-CM

## 2020-07-22 DIAGNOSIS — E03.9 ACQUIRED HYPOTHYROIDISM: ICD-10-CM

## 2020-07-22 DIAGNOSIS — N89.8 VAGINAL DISCHARGE: Primary | ICD-10-CM

## 2020-07-22 DIAGNOSIS — N89.8 VAGINAL DISCHARGE: ICD-10-CM

## 2020-07-22 LAB
SPECIMEN SOURCE: ABNORMAL
WET PREP SPEC: ABNORMAL

## 2020-07-22 PROCEDURE — 87210 SMEAR WET MOUNT SALINE/INK: CPT | Performed by: NURSE PRACTITIONER

## 2020-07-22 PROCEDURE — 99214 OFFICE O/P EST MOD 30 MIN: CPT | Mod: 95 | Performed by: NURSE PRACTITIONER

## 2020-07-22 PROCEDURE — 36415 COLL VENOUS BLD VENIPUNCTURE: CPT | Performed by: NURSE PRACTITIONER

## 2020-07-22 PROCEDURE — 84443 ASSAY THYROID STIM HORMONE: CPT | Performed by: NURSE PRACTITIONER

## 2020-07-22 PROCEDURE — 80048 BASIC METABOLIC PNL TOTAL CA: CPT | Performed by: NURSE PRACTITIONER

## 2020-07-22 RX ORDER — HYDROCHLOROTHIAZIDE 12.5 MG/1
12.5 TABLET ORAL DAILY
Qty: 90 TABLET | Refills: 1 | Status: SHIPPED | OUTPATIENT
Start: 2020-07-22 | End: 2021-03-05

## 2020-07-22 NOTE — LETTER
July 22, 2020          Eunice Barrientos,  [address] [facility] [address]        Dear Eunice Barrientos      This is your after visit summary from today's visit. Feel free to contact us if you have any questions or concerns!      Sincerely,       Your [facility] Team

## 2020-07-22 NOTE — PATIENT INSTRUCTIONS
Patient Education     Established High Blood Pressure    High blood pressure (hypertension) is a chronic disease. Often, healthcare providers don t know what causes it. But it can be caused by certain health conditions and medicines.  If you have high blood pressure, you may not have any symptoms. If you do have symptoms, they may include headache, dizziness, changes in your vision, chest pain, and shortness of breath. But even without symptoms, high blood pressure that s not treated raises your risk for heart attack, heart failure, and stroke. High blood pressure is a serious health risk and shouldn t be ignored.  Blood pressure measurements are given as 2 numbers. Systolic blood pressure is the upper number. This is the pressure when the heart contracts. Diastolic blood pressure is the lower number. This is the pressure when the heart relaxes between beats. You will see your blood pressure readings written together. For example, a person with a systolic pressure of 118 and a diastolic pressure of 78 will have 118/78 written in the medical record.  Blood pressure is categorized as normal, elevated, or stage 1 or stage 2 high blood pressure:    Normal blood pressure is systolic of less than 120 and diastolic of less than 80 (120/80)    Elevated blood pressure is systolic of 120 to 129 and diastolic less than 80    Stage 1 high blood pressure is systolic is 130 to 139 or diastolic between 80 to 89    Stage 2 high blood pressure is when systolic is 140 or higher or the diastolic is 90 or higher  Home care  If you have high blood pressure, follow these home care guidelines to help lower your blood pressure. If you are taking medicines for high blood pressure, these methods may reduce or end your need for medicines in the future.    Start a weight-loss program if you are overweight.    Cut back on how much salt you get in your diet. Here s how to do this:  ? Don t eat foods that have a lot of salt. These include  olives, pickles, smoked meats, and salted potato chips.  ? Don t add salt to your food at the table.  ? Use only small amounts of salt when cooking.    Start an exercise program. Talk with your healthcare provider about the type of exercise program that would be best for you. It doesn't have to be hard. Even brisk walking for 20 minutes 3 times a week is a good form of exercise.    Don t take medicines that stimulate the heart. This includes many over-the-counter cold and sinus decongestant pills and sprays, as well as diet pills. Check the warnings about high blood pressure on the label. Before buying any over-the-counter medicines or supplements, always ask the pharmacist about the product's potential interaction with your high blood pressure and your high blood pressure medicines.    Stimulants such as amphetamine or cocaine could be deadly for someone with high blood pressure. Never take these.    Limit how much caffeine you get in your diet. Switch to caffeine-free products.    Stop smoking. If you are a long-time smoker, this can be hard. Talk to your healthcare provider about medicines and nicotine replacement options to help you. Also, enroll in a stop-smoking program to make it more likely that you will quit for good.    Learn how to handle stress. This is an important part of any program to lower blood pressure. Learn about relaxation methods like meditation, yoga, or biofeedback.    If your provider prescribed medicines, take them exactly as directed. Missing doses may cause your blood pressure get out of control.    If you miss a dose or doses, check with your healthcare provider or pharmacist about what to do.    Consider buying an automatic blood pressure machine to check your blood pressure at home. Ask your provider for a recommendation. You can get one of these at most pharmacies.     The American Heart Association recommends the following guidelines for home blood pressure monitoring:    Don't  smoke or drink coffee for 30 minutes before taking your blood pressure.    Go to the bathroom before the test.    Relax for 5 minutes before taking the measurement.    Sit with your back supported (don't sit on a couch or soft chair); keep your feet on the floor uncrossed. Place your arm on a solid flat surface (like a table) with the upper part of the arm at heart level. Place the middle of the cuff directly above the bend of the elbow. Check the monitor's instruction manual for an illustration.    Take multiple readings. When you measure, take 2 to 3 readings one minute apart and record all of the results.    Take your blood pressure at the same time every day, or as your healthcare provider recommends.    Record the date, time, and blood pressure reading.    Take the record with you to your next medical appointment. If your blood pressure monitor has a built-in memory, simply take the monitor with you to your next appointment.    Call your provider if you have several high readings. Don't be frightened by a single high blood pressure reading, but if you get several high readings, check in with your healthcare provider.    Note: When blood pressure reaches a systolic (top number) of 180 or higher OR diastolic (bottom number) of 110 or higher, seek emergency medical treatment.  Follow-up care  You will need to see your healthcare provider regularly. This is to check your blood pressure and to make changes to your medicines. Make a follow-up appointment as directed. Bring the record of your home blood pressure readings to the appointment.  When to seek medical advice  Call your healthcare provider right away if any of these occur:    Blood pressure reaches a systolic (upper number) of 180 or higher OR a diastolic (bottom number) of 110 or higher    Chest pain or shortness of breath    Severe headache    Throbbing or rushing sound in the ears    Nosebleed    Sudden severe pain in your belly (abdomen)    Extreme  drowsiness, confusion, or fainting    Dizziness or spinning sensation (vertigo)    Weakness of an arm or leg or one side of the face    You have problems speaking or seeing   Date Last Reviewed: 12/1/2016 2000-2019 [employer] [address] [place] [id number]. All rights reserved. This information is not intended as a substitute for professional medical care. Always follow your healthcare professional's instructions.

## 2020-07-22 NOTE — PROGRESS NOTES
"Eunice Barrientos is a 50 year old female who is being evaluated via a billable telephone visit.      The patient has been notified of following:     \"This telephone visit will be conducted via a call between you and your physician/provider. We have found that certain health care needs can be provided without the need for a physical exam.  This service lets us provide the care you need with a short phone conversation.  If a prescription is necessary we can send it directly to your pharmacy.  If lab work is needed we can place an order for that and you can then stop by our lab to have the test done at a later time.    Telephone visits are billed at different rates depending on your insurance coverage. During this emergency period, for some insurers they may be billed the same as an in-person visit.  Please reach out to your insurance provider with any questions.    If during the course of the call the physician/provider feels a telephone visit is not appropriate, you will not be charged for this service.\"    Patient has given verbal consent for Telephone visit?  Yes    What phone number would you like to be contacted at? [phone]    How would you like to obtain your AVS? Mail a copy    Subjective     Eunice Barrientos is a 50 year old female who presents via phone visit today for the following health issues:    HPI    Hypertension Follow-up      Do you check your blood pressure regularly outside of the clinic? No     Are you following a low salt diet? Yes    Are your blood pressures ever more than 140 on the top number (systolic) OR more   than 90 on the bottom number (diastolic), for example 140/90? No      How many servings of fruits and vegetables do you eat daily?  2-3    On average, how many sweetened beverages do you drink each day (Examples: soda, juice, sweet tea, etc.  Do NOT count diet or artificially sweetened beverages)?   2 cans of soda, 2 glass of juice     How many days per week do you exercise enough " to make your heart beat faster? Not exercising States that she is planning to start exercising again soon.     How many minutes a day do you exercise enough to make your heart beat faster? Not exercising  How many days per week do you miss taking your medication? 2    What makes it hard for you to take your medications?  rain out of medication    Vaginal Symptoms  Onset: x 1 week    Description:  Vaginal Discharge: white   Itching (Pruritis): YES  Burning sensation:  no   Odor: YES    Accompanying Signs & Symptoms:  Pain with Urination: no   Abdominal Pain: no   Fever: no     History:   Sexually active: no   New Partner: no   Possibility of Pregnancy:  No    Precipitating factors:   Recent Antibiotic Use: no     Alleviating factors:  none  Therapies Tried and outcome: nothing  States that this feels like a yeast infections she has had in the past, last was a couple of months ago.       Patient Active Problem List   Diagnosis     Hypothyroidism     Intracranial shunt     CARDIOVASCULAR SCREENING; LDL GOAL LESS THAN 130     Hypertension goal BP (blood pressure) < 140/90     Macular degeneration, dry, mod, od; dry with foveal scar, os     Morbid obesity (H)     Chronic left-sided low back pain with right-sided sciatica     History of pulmonary embolism     Past Surgical History:   Procedure Laterality Date     CHOLECYSTECTOMY       COLPOSCOPY CERVIX, BIOPSY CERVIX, ENDOCERVICAL CURETTAGE, COMBINED  2009    several, earliest was 1992     FUSION LUMBAR ANTERIOR ONE LEVEL  2013     intracranial shunt       LEEP TX, CERVICAL  1995     MASTOIDECTOMY  2003     OOPHORECTOMY Left        Social History     Tobacco Use     Smoking status: Never Smoker     Smokeless tobacco: Never Used   Substance Use Topics     Alcohol use: No     Family History   Problem Relation Age of Onset     Diabetes Maternal Grandmother      Hypertension Maternal Grandmother      Cancer Maternal Aunt      Cerebrovascular Disease No family hx of       Thyroid Disease No family hx of      Glaucoma No family hx of      Macular Degeneration No family hx of          Current Outpatient Medications   Medication Sig Dispense Refill     EUTHYROX 125 MCG tablet TAKE 1 TABLET BY MOUTH EVERY OTHER DAY ALTERNATING  WITH  137  MCG  TABLETS 45 tablet 1     EUTHYROX 137 MCG tablet TAKE 1 TABLET BY MOUTH EVERY OTHER DAY ALTERNATING  WITH  125  MCG  TABLETS 45 tablet 0     hydrochlorothiazide (HYDRODIURIL) 12.5 MG tablet Take 1 tablet by mouth once daily 30 tablet 0     naproxen (NAPROSYN) 500 MG tablet Take 1 tablet (500 mg) by mouth 2 times daily (with meals) 60 tablet 1     Prenatal Vit-Fe Fumarate-FA (PRENATAL MULTIVITAMIN  PLUS IRON) 27-0.8 MG TABS per tablet Take 1 tablet by mouth daily       Allergies   Allergen Reactions     Terazol [Terconazole] Hives     BP Readings from Last 3 Encounters:   08/05/19 118/88   07/02/19 130/88   06/12/19 (!) 140/104    Wt Readings from Last 3 Encounters:   08/05/19 110.7 kg (244 lb)   06/12/19 110.2 kg (243 lb)   12/10/18 112 kg (247 lb)                    Reviewed and updated as needed this visit by Provider  Tobacco  Allergies  Meds  Problems  Med Hx  Surg Hx  Fam Hx         Review of Systems   Constitutional, HEENT, cardiovascular, pulmonary, gi and gu systems are negative, except as otherwise noted.       Objective   Reported vitals:  There were no vitals taken for this visit.   healthy, alert and no distress  PSYCH: Alert and oriented times 3; coherent speech, normal   rate and volume, able to articulate logical thoughts, able   to abstract reason, no tangential thoughts, no hallucinations   or delusions  Her affect is normal  RESP: No cough, no audible wheezing, able to talk in full sentences  Remainder of exam unable to be completed due to telephone visits    Diagnostic Test Results:  Labs reviewed in Epic  none         Assessment/Plan:    1. Hypertension goal BP (blood pressure) < 140/90  - hydrochlorothiazide (HYDRODIURIL)  12.5 MG tablet; Take 1 tablet (12.5 mg) by mouth daily  Dispense: 90 tablet; Refill: 1  - **Basic metabolic panel FUTURE anytime; Future    2. Morbid obesity (H)  Counseled on regular exercise and healthy diet.     3. Vaginal discharge  - Wet prep; Future    4. Acquired hypothyroidism  - **TSH with free T4 reflex FUTURE anytime; Future    Return in about 2 months (around 9/22/2020) for Physical. And lab only appointment asap. .    Phone call duration:  7 minutes    ABIOLA Lanier CNP

## 2020-07-23 DIAGNOSIS — B96.89 BV (BACTERIAL VAGINOSIS): Primary | ICD-10-CM

## 2020-07-23 DIAGNOSIS — N76.0 BV (BACTERIAL VAGINOSIS): Primary | ICD-10-CM

## 2020-07-23 LAB
ANION GAP SERPL CALCULATED.3IONS-SCNC: 7 MMOL/L (ref 3–14)
BUN SERPL-MCNC: 13 MG/DL (ref 7–30)
CALCIUM SERPL-MCNC: 8.3 MG/DL (ref 8.5–10.1)
CHLORIDE SERPL-SCNC: 108 MMOL/L (ref 94–109)
CO2 SERPL-SCNC: 25 MMOL/L (ref 20–32)
CREAT SERPL-MCNC: 0.73 MG/DL (ref 0.52–1.04)
GFR SERPL CREATININE-BSD FRML MDRD: >90 ML/MIN/{1.73_M2}
GLUCOSE SERPL-MCNC: 81 MG/DL (ref 70–99)
POTASSIUM SERPL-SCNC: 4.3 MMOL/L (ref 3.4–5.3)
SODIUM SERPL-SCNC: 140 MMOL/L (ref 133–144)
TSH SERPL DL<=0.005 MIU/L-ACNC: 2.83 MU/L (ref 0.4–4)

## 2020-07-23 RX ORDER — METRONIDAZOLE 500 MG/1
500 TABLET ORAL 2 TIMES DAILY
Qty: 14 TABLET | Refills: 0 | Status: SHIPPED | OUTPATIENT
Start: 2020-07-23 | End: 2020-07-30

## 2020-07-24 ENCOUNTER — TELEPHONE (OUTPATIENT)
Dept: FAMILY MEDICINE | Facility: CLINIC | Age: 50
End: 2020-07-24

## 2020-07-24 NOTE — TELEPHONE ENCOUNTER
2nd attempts: called patient. Unable to leave message due to patient have not have voicemail set up yet. Please mail out normal results to patient.  Eloise Mendez, CMA

## 2020-07-24 NOTE — LETTER
July 24, 2020      Eunice Barrientos  [address] [place] [facility] [address]        Dear ,    We are writing to inform you of your test results.     Your labs (thyroid, kidneys, and electrolytes) look good.     Resulted Orders   **TSH with free T4 reflex FUTURE anytime   Result Value Ref Range    TSH 2.83 0.40 - 4.00 mU/L   Wet prep   Result Value Ref Range    Specimen Description Vagina     Wet Prep No Trichomonas seen     Wet Prep Clue cells seen (A)     Wet Prep No yeast seen     Wet Prep Few  WBC'S seen      **Basic metabolic panel FUTURE anytime   Result Value Ref Range    Sodium 140 133 - 144 mmol/L    Potassium 4.3 3.4 - 5.3 mmol/L    Chloride 108 94 - 109 mmol/L    Carbon Dioxide 25 20 - 32 mmol/L    Anion Gap 7 3 - 14 mmol/L    Glucose 81 70 - 99 mg/dL      Comment:      Non Fasting    Urea Nitrogen 13 7 - 30 mg/dL    Creatinine 0.73 0.52 - 1.04 mg/dL    GFR Estimate >90 >60 mL/min/[1.73_m2]      Comment:      Non  GFR Calc  Starting 12/18/2018, serum creatinine based estimated GFR (eGFR) will be   calculated using the Chronic Kidney Disease Epidemiology Collaboration   (CKD-EPI) equation.      GFR Estimate If Black >90 >60 mL/min/[1.73_m2]      Comment:       GFR Calc  Starting 12/18/2018, serum creatinine based estimated GFR (eGFR) will be   calculated using the Chronic Kidney Disease Epidemiology Collaboration   (CKD-EPI) equation.      Calcium 8.3 (L) 8.5 - 10.1 mg/dL   If you have any questions or concerns, please call the clinic at the number listed above.       Sincerely,        Ya Ortiz, APRN CNP

## 2020-07-24 NOTE — TELEPHONE ENCOUNTER
Called and left patient VM to return call to go over results  Jaime Mendez CMA on 7/24/2020 at 7:55 AM      Ya Ortiz APRN CNP  P  [facility]               Please call patient or send letter with results. Your labs (thyroid, kidneys, and electrolytes) look good.     ABIOLA Mar, CNP

## 2020-09-09 ENCOUNTER — DOCUMENTATION ONLY (OUTPATIENT)
Dept: OPHTHALMOLOGY | Facility: CLINIC | Age: 50
End: 2020-09-09

## 2020-09-14 ENCOUNTER — DOCUMENTATION ONLY (OUTPATIENT)
Dept: OPHTHALMOLOGY | Facility: CLINIC | Age: 50
End: 2020-09-14

## 2020-10-05 ENCOUNTER — NURSE TRIAGE (OUTPATIENT)
Dept: FAMILY MEDICINE | Facility: CLINIC | Age: 50
End: 2020-10-05

## 2020-10-05 NOTE — TELEPHONE ENCOUNTER
Ya- Please advise are you okay with seeing pt today at 2:40 in clinic, or would you like us to advise UC/ER?  Pt called stating that she has appt for next Monday. For a week or longer has been having sharp stabbing pain on right side of chest by shoulder. She has history of a blood clot there years ago, not sure exactly when, but states that it was several years ago. She was wondering if she can wait a week or if she should be seen sooner. States that her veins are really bad. She had shunt placed in 2003, then started having medical problem with some infections and then a blood clot. States that this is the same type of pain that she had last time. Pain located between right breast and shoulder in front of the armpit. Initially pain would come and go, but now it has been more continuous. States that sometimes pain goes down arm on right side and sometimes lasts longer than 5 minutes. She declines any fever, shortness of breath. States that she has not noticed any swelling but her spouse told her that they noticed site looked swollen, there is no redness.some tenderness. Pt cannot think of anything that she has done such as injury/overuse where she could have pulled a muscle.   Due to hx of previous blood clot and chest pain that last longer than 5 min, radiating down arm, recommended to be seen today. Will route to provider, per triage protocol OV in clinic okay with PCP approval, otherwise UC/ER.

## 2020-10-05 NOTE — TELEPHONE ENCOUNTER
Reason for Disposition    Pain also present in shoulder(s) or arm(s) or jaw    Additional Information    Chest pain lasting longer than 5 minutes    Negative: Severe difficulty breathing (e.g., struggling for each breath, speaks in single words)    Negative: Passed out (i.e., fainted, collapsed and was not responding)    Negative: Chest pain lasting longer than 5 minutes and ANY of the following:* Over 50 years old* Over 30 years old and at least one cardiac risk factor (i.e., high blood pressure, diabetes, high cholesterol, obesity, smoker or strong family history of heart disease)* Pain is crushing, pressure-like, or heavy * Took nitroglycerin and chest pain was not relieved* History of heart disease (i.e., angina, heart attack, bypass surgery, angioplasty, CHF)    Negative: Visible sweat on face or sweat dripping down face    Negative: Sounds like a life-threatening emergency to the triager    Negative: Followed an injury to chest    Negative: SEVERE chest pain    Negative: Difficulty breathing    Negative: Cocaine use within last 3 days    Negative: History of prior 'blood clot' in leg or lungs (i.e., deep vein thrombosis, pulmonary embolism)    Negative: Recent illness requiring prolonged bed rest (i.e., immobilization)    Negative: Hip or leg fracture in past 2 months (e.g, or had cast on leg or ankle)    Negative: Major surgery in the past month    Negative: Recent long-distance travel with prolonged time in car, bus, plane, or train (i.e., within past 2 weeks; 6 or more hours duration)    Negative: Heart beating irregularly or very rapidly    Negative: Intermittent chest pain and pain has been increasing in severity or frequency    Negative: Dizziness or lightheadedness    Negative: Coughing up blood    Negative: Patient sounds very sick or weak to the triager    Negative: Fever > 100.5 F (38.1 C)    Negative: Intermittent chest pains persist > 3 days    Negative: All other patients with chest pain     Negative: Patient wants to be seen    Negative: Intermittent mild chest pain lasting a few seconds each time    Protocols used: CHEST PAIN-A-OH

## 2020-10-05 NOTE — TELEPHONE ENCOUNTER
Called patient and notified her that provider is recommending UC/ER. Pt verbalized understanding and states that she will go in for evaluation.

## 2020-10-06 ENCOUNTER — TELEPHONE (OUTPATIENT)
Dept: FAMILY MEDICINE | Facility: CLINIC | Age: 50
End: 2020-10-06

## 2020-10-06 NOTE — TELEPHONE ENCOUNTER
"Please see initial message from reception and advise. Okay for patient to wait to discuss these findings at appt 10/12, or should she come in sooner?    Per chart review, pt was seen at [facility] 10/05.   \"I paged cardiologist lio consult and discussed the finding of pericardial effusion with cardiologist.  He indicated many times may be incidental benign findings but echocardiogram at some point should be done to check things further.  Please see indicated does not have to be done on an urgent basis.  I explained this to patient and advised her to keep her appointment with her primary care provider this coming week and to follow-up on that.    The right upper chest pain is probably tendinitis. She will take over-the-counter anti-inflammatory pain medications for pain as needed and will follow-up with primary care provider.    Her blood pressure was noted to be running high today. She has been to follow-up on that with primary care provider to this coming week.    Bryan Bravo MD\"  "

## 2020-10-06 NOTE — TELEPHONE ENCOUNTER
Patient states she went to the ER and had a CT and found out she has liquid around her heart and wonders if she should wait for her appointment this Monday or come in sooner.  Please call.    Thank you.

## 2020-10-06 NOTE — TELEPHONE ENCOUNTER
Patient calling. She wants to know if her blood pressure medication can be changed. It's running high. Please call and advise.

## 2020-10-07 NOTE — TELEPHONE ENCOUNTER
Reason for Call:  Other call back    Detailed comments: patient is calling back for status on previous message. Please all to discuss. Thank  You.    Phone Number Patient can be reached at: Home number on file [phone] (home)    Best Time:     Can we leave a detailed message on this number? YES    Call taken on 10/7/2020 at 8:39 AM by Isabella Mendez

## 2020-10-07 NOTE — TELEPHONE ENCOUNTER
Called patient and notified her of reply from Lilian. Pt verbalized understanding and had no further questions.

## 2020-10-07 NOTE — TELEPHONE ENCOUNTER
Reason for Call:  Other call back    Detailed comments: patient is calling for status on message below, please call to discuss. Thank you.    Phone Number Patient can be reached at: Home number on file [phone] (home)    Best Time:     Can we leave a detailed message on this number? YES    Call taken on 10/7/2020 at 7:42 AM by Isabella Mendez

## 2020-10-12 ENCOUNTER — ALLIED HEALTH/NURSE VISIT (OUTPATIENT)
Dept: NURSING | Facility: CLINIC | Age: 50
End: 2020-10-12
Payer: COMMERCIAL

## 2020-10-12 ENCOUNTER — HOSPITAL ENCOUNTER (EMERGENCY)
Facility: CLINIC | Age: 50
Discharge: HOME | End: 2020-10-12
Attending: EMERGENCY MEDICINE | Admitting: EMERGENCY MEDICINE
Payer: COMMERCIAL

## 2020-10-12 ENCOUNTER — APPOINTMENT (OUTPATIENT)
Dept: GENERAL RADIOLOGY | Facility: CLINIC | Age: 50
End: 2020-10-12
Attending: EMERGENCY MEDICINE
Payer: COMMERCIAL

## 2020-10-12 ENCOUNTER — APPOINTMENT (OUTPATIENT)
Dept: ULTRASOUND IMAGING | Facility: CLINIC | Age: 50
End: 2020-10-12
Attending: EMERGENCY MEDICINE
Payer: COMMERCIAL

## 2020-10-12 VITALS — SYSTOLIC BLOOD PRESSURE: 150 MMHG | DIASTOLIC BLOOD PRESSURE: 90 MMHG | HEART RATE: 84 BPM | OXYGEN SATURATION: 98 %

## 2020-10-12 VITALS
RESPIRATION RATE: 16 BRPM | BODY MASS INDEX: 36.8 KG/M2 | HEART RATE: 74 BPM | SYSTOLIC BLOOD PRESSURE: 120 MMHG | DIASTOLIC BLOOD PRESSURE: 86 MMHG | HEIGHT: 62 IN | OXYGEN SATURATION: 96 % | TEMPERATURE: 98.3 F | WEIGHT: 200 LBS

## 2020-10-12 DIAGNOSIS — R07.9 CHEST PAIN: Primary | ICD-10-CM

## 2020-10-12 DIAGNOSIS — R07.9 CHEST PAIN, UNSPECIFIED TYPE: ICD-10-CM

## 2020-10-12 LAB
ALBUMIN SERPL-MCNC: 3.9 G/DL (ref 3.4–5)
ALP SERPL-CCNC: 76 U/L (ref 40–150)
ALT SERPL W P-5'-P-CCNC: 22 U/L (ref 0–50)
ANION GAP SERPL CALCULATED.3IONS-SCNC: 6 MMOL/L (ref 3–14)
AST SERPL W P-5'-P-CCNC: 15 U/L (ref 0–45)
BASOPHILS # BLD AUTO: 0.1 10E9/L (ref 0–0.2)
BASOPHILS NFR BLD AUTO: 0.6 %
BILIRUB SERPL-MCNC: 0.2 MG/DL (ref 0.2–1.3)
BUN SERPL-MCNC: 15 MG/DL (ref 7–30)
CALCIUM SERPL-MCNC: 9 MG/DL (ref 8.5–10.1)
CHLORIDE SERPL-SCNC: 106 MMOL/L (ref 94–109)
CO2 SERPL-SCNC: 27 MMOL/L (ref 20–32)
CREAT SERPL-MCNC: 0.77 MG/DL (ref 0.52–1.04)
DIFFERENTIAL METHOD BLD: NORMAL
EOSINOPHIL # BLD AUTO: 0.2 10E9/L (ref 0–0.7)
EOSINOPHIL NFR BLD AUTO: 1.9 %
ERYTHROCYTE [DISTWIDTH] IN BLOOD BY AUTOMATED COUNT: 13.2 % (ref 10–15)
GFR SERPL CREATININE-BSD FRML MDRD: >90 ML/MIN/{1.73_M2}
GLUCOSE SERPL-MCNC: 84 MG/DL (ref 70–99)
HCT VFR BLD AUTO: 42.1 % (ref 35–47)
HGB BLD-MCNC: 13.8 G/DL (ref 11.7–15.7)
IMM GRANULOCYTES # BLD: 0 10E9/L (ref 0–0.4)
IMM GRANULOCYTES NFR BLD: 0.2 %
LIPASE SERPL-CCNC: 100 U/L (ref 73–393)
LYMPHOCYTES # BLD AUTO: 2.3 10E9/L (ref 0.8–5.3)
LYMPHOCYTES NFR BLD AUTO: 25.1 %
MCH RBC QN AUTO: 32.2 PG (ref 26.5–33)
MCHC RBC AUTO-ENTMCNC: 32.8 G/DL (ref 31.5–36.5)
MCV RBC AUTO: 98 FL (ref 78–100)
MONOCYTES # BLD AUTO: 0.5 10E9/L (ref 0–1.3)
MONOCYTES NFR BLD AUTO: 5.1 %
NEUTROPHILS # BLD AUTO: 6.2 10E9/L (ref 1.6–8.3)
NEUTROPHILS NFR BLD AUTO: 67.1 %
NRBC # BLD AUTO: 0 10*3/UL
NRBC BLD AUTO-RTO: 0 /100
PLATELET # BLD AUTO: 264 10E9/L (ref 150–450)
POTASSIUM SERPL-SCNC: 3.9 MMOL/L (ref 3.4–5.3)
PROT SERPL-MCNC: 7.8 G/DL (ref 6.8–8.8)
RBC # BLD AUTO: 4.28 10E12/L (ref 3.8–5.2)
SODIUM SERPL-SCNC: 139 MMOL/L (ref 133–144)
TROPONIN I SERPL-MCNC: <0.015 UG/L (ref 0–0.04)
TROPONIN I SERPL-MCNC: <0.015 UG/L (ref 0–0.04)
WBC # BLD AUTO: 9.3 10E9/L (ref 4–11)

## 2020-10-12 PROCEDURE — 93308 TTE F-UP OR LMTD: CPT | Mod: 26 | Performed by: EMERGENCY MEDICINE

## 2020-10-12 PROCEDURE — 93005 ELECTROCARDIOGRAM TRACING: CPT | Mod: 59 | Performed by: EMERGENCY MEDICINE

## 2020-10-12 PROCEDURE — 83690 ASSAY OF LIPASE: CPT | Performed by: EMERGENCY MEDICINE

## 2020-10-12 PROCEDURE — 99285 EMERGENCY DEPT VISIT HI MDM: CPT | Mod: 25 | Performed by: EMERGENCY MEDICINE

## 2020-10-12 PROCEDURE — 84484 ASSAY OF TROPONIN QUANT: CPT | Mod: 91 | Performed by: EMERGENCY MEDICINE

## 2020-10-12 PROCEDURE — 93971 EXTREMITY STUDY: CPT | Mod: 26

## 2020-10-12 PROCEDURE — 250N000011 HC RX IP 250 OP 636: Performed by: EMERGENCY MEDICINE

## 2020-10-12 PROCEDURE — 96374 THER/PROPH/DIAG INJ IV PUSH: CPT | Performed by: EMERGENCY MEDICINE

## 2020-10-12 PROCEDURE — 93971 EXTREMITY STUDY: CPT | Mod: RT,59

## 2020-10-12 PROCEDURE — 71046 X-RAY EXAM CHEST 2 VIEWS: CPT | Mod: 26

## 2020-10-12 PROCEDURE — 85025 COMPLETE CBC W/AUTO DIFF WBC: CPT | Performed by: EMERGENCY MEDICINE

## 2020-10-12 PROCEDURE — 93010 ELECTROCARDIOGRAM REPORT: CPT | Mod: 59 | Performed by: EMERGENCY MEDICINE

## 2020-10-12 PROCEDURE — 250N000013 HC RX MED GY IP 250 OP 250 PS 637: Performed by: EMERGENCY MEDICINE

## 2020-10-12 PROCEDURE — 93308 TTE F-UP OR LMTD: CPT | Performed by: EMERGENCY MEDICINE

## 2020-10-12 PROCEDURE — 93971 EXTREMITY STUDY: CPT | Mod: RT

## 2020-10-12 PROCEDURE — 96375 TX/PRO/DX INJ NEW DRUG ADDON: CPT | Performed by: EMERGENCY MEDICINE

## 2020-10-12 PROCEDURE — 71046 X-RAY EXAM CHEST 2 VIEWS: CPT

## 2020-10-12 PROCEDURE — 80053 COMPREHEN METABOLIC PANEL: CPT | Performed by: EMERGENCY MEDICINE

## 2020-10-12 RX ORDER — MORPHINE SULFATE 4 MG/ML
4 INJECTION, SOLUTION INTRAMUSCULAR; INTRAVENOUS ONCE
Status: COMPLETED | OUTPATIENT
Start: 2020-10-12 | End: 2020-10-12

## 2020-10-12 RX ORDER — ASPIRIN 81 MG/1
243 TABLET, CHEWABLE ORAL ONCE
Status: COMPLETED | OUTPATIENT
Start: 2020-10-12 | End: 2020-10-12

## 2020-10-12 RX ORDER — ONDANSETRON 2 MG/ML
4 INJECTION INTRAMUSCULAR; INTRAVENOUS ONCE
Status: COMPLETED | OUTPATIENT
Start: 2020-10-12 | End: 2020-10-12

## 2020-10-12 RX ADMIN — MORPHINE SULFATE 4 MG: 4 INJECTION INTRAVENOUS at 21:20

## 2020-10-12 RX ADMIN — ASPIRIN 243 MG: 81 TABLET, CHEWABLE ORAL at 20:27

## 2020-10-12 RX ADMIN — ONDANSETRON 4 MG: 2 INJECTION INTRAMUSCULAR; INTRAVENOUS at 20:29

## 2020-10-12 ASSESSMENT — PAIN SCALES - GENERAL: PAINLEVEL: SEVERE PAIN (7)

## 2020-10-12 ASSESSMENT — MIFFLIN-ST. JEOR: SCORE: 1490.44

## 2020-10-12 NOTE — NURSING NOTE
Eunice Barrientos is a 50 year old female who calls with chest pain.     PRESENTING PROBLEM:  Right sided chest pain, right arm pain doesn't go all the way, but to her elbow.     NURSING ASSESSMENT:  Patient complains of chest pain, chest pressure/discomfort and dizziness, right arm pain, tingling in hand and fingers. Pt seen in  on 10/5 and was advised to have an echocardiogram and this has not been completed. States arm pain is a new symptom since seen in .  Onset:  Chest pain > 3 weeks, R arm pain x 2-3 days  Pain is characterized as pressure   Severity 6-7 out of 10  Located right chest and right arm   Radiates to right arm.  Duration intermittent.  Associated symptoms lightheadedness and tingling in hand and fingers.  Exacerbated by no known provoking events.  Relieved by none.  Cardiac risk factors: hypertension and overweight.  Associated Medical History: Has a history of a clot and is having pain in the same area she had the clot.  Allergies:   Allergies   Allergen Reactions     Terazol [Terconazole] Hives       MEDICATIONS:  Taking medication(s) as prescribed? N/A  Taking over the counter medication(s) ?Yes  Any medication side effects? Not Applicable    Any barriers to taking medication(s) as prescribed?  N/A  Medication(s) improving/managing symptoms?  No  Medication reconciliation completed: No    Last exam/Treatment:  10/5/2020 [facility]    NURSING PLAN: Nursing advice to patient Go to ER    RECOMMENDED DISPOSITION:  To ED, another person to drive - Pt will go to [facility]  Will comply with recommendation: Yes  If further questions/concerns or if symptoms do not improve, worsen or new symptoms develop, call your PCP or [facility] as soon as possible.      Guideline used:  Diogo's Drug Guide for Nurses, 5th Edition, Hilda Martinez and Emerita Harris.     Elvira Ray RN

## 2020-10-12 NOTE — ED AVS SNAPSHOT
[facility] Emergency Department  [address] [facility] [address]  Phone: [phone]                                    Eunice Barrientos   MRN: [id number]    Department: [facility] Emergency Department   Date of Visit: 10/12/2020           After Visit Summary Signature Page    I have received my discharge instructions, and my questions have been answered. I have discussed any challenges I see with this plan with the nurse or doctor.    ..........................................................................................................................................  Patient/Patient Representative Signature      ..........................................................................................................................................  Patient Representative Print Name and Relationship to Patient    ..................................................               ................................................  Date                                   Time    ..........................................................................................................................................  Reviewed by Signature/Title    ...................................................              ..............................................  Date                                               Time          22EPIC Rev 08/18

## 2020-10-12 NOTE — ED PROVIDER NOTES
ED Provider Note  [facility]      History     Chief Complaint   Patient presents with     Chest Pain     The history is provided by the patient, a significant other and medical records.     Eunice Barrientos is a 50 year old female with a medical history significant for hypertension, PE (not on anticoagulation), hydrocephalus s/p  shunt, hypothyroidism and is s/p anterior lumbar fusion (2013) who presents to the Emergency Department for evaluation of right-sided chest pain.  Patient points to a specific area in her right chest that she states has been bothering her for the last 3 weeks.  Patient reports that she was concerned as this is the same pain and in the same place that she had when she was diagnosed with a PE many years ago.  Per review of patient's chart, patient was seen at [facility] on 10/5/2020 for this pain.  Patient had a CT chest PE done that was negative for PE, but did show trace pericardial effusion.  Patient was advised to have an echocardiogram done for further evaluation.  Patient was supposed to have an appointment to see a cardiologist today, but patient had the wrong time and ended up being late.  She was told to come here to the Emergency Department for evaluation instead.    Patient reports that this pain initially started and was intermittent, but for the past week it has been constant.  She states that it is a sharp and pressure type pain.  She notes that the pain is exacerbated with certain movements and with moving her right arm, but she denies any exacerbation of the pain with taking deep breaths or exertion.  She notes that today she began having some mild shortness of breath with exertion, but she otherwise denies any shortness of breath over the last 3 weeks.  She does report that she has had some intermittent right arm pain as well and in the last 2 days she has noticed that her right hand/arm has been slightly swollen.  She reports that  the swelling has been associated with intermittent tingling in her right hand as well.  Patient states that this right arm pain and tingling does not seem to worsen with worsening of her chest pain, she states that tingling and right arm pain seems to come on randomly.  Patient denies any neck pain, back pain, leg swelling, nausea, vomiting, diaphoresis, abdominal pain or weakness.  She does report that she has had some recent diarrhea, but she denies any blood in the stool.  Patient has been taking Advil for her symptoms.  Patient denies any history of cardiac disease and she denies any known family history of cardiac disease.  Patient denies any traumas or injuries to her chest or right arm.  The patient's significant other does note that she had a fall out of bed a few nights ago.  The patient reports that she landed on her buttocks, she denies striking her head during the fall and denies any loss of consciousness.  Patient denies any headaches since the fall.  Patient reports that this fall occurred after the onset of all of her other symptoms.  Patient denies any history of diabetes mellitus, high cholesterol, asthma or bronchitis.  Patient does not smoke.  Patient denies any known exposure to COVID-19.  She denies any fevers.  She does report that she has a chronic, intermittent, nonproductive cough which she has had for years.  Patient believes that this cough is due to allergies and she has been evaluated for this cough in the past.  Patient denies any recent changes in this cough.  Patient reports that her PE was due to having a central line in place.    Patient does report that she has been having issues with hypertension recently.  She states that she has been using a diuretic recently per instructions from her PCP, but she continues to have difficulty with hypertension.  Patient also reports that for the past year she has been having intermittent shooting right leg pain.  She does note that she has had a  past back surgery.  She denies any loss of bowel or bladder control and denies any saddle anesthesia. No numbness, tingling, or weakness of the lower extremities. No leg swelling.     CT Chest PE Study (10/5/2020; [facility])  IMPRESSION:  1.  Negative for pulmonary embolism.  2.  Clear lungs.  3.  Trace pericardial effusion.    Past Medical History  Past Medical History:   Diagnosis Date     History of blood transfusion      History of DVT (deep vein thrombosis)     related to port     Hydrocephalus (H)      Hypertension goal BP (blood pressure) < 140/90      Macular degeneration (senile) of retina 7/6/2017     Thyroid disease      Past Surgical History:   Procedure Laterality Date     CHOLECYSTECTOMY       COLPOSCOPY CERVIX, BIOPSY CERVIX, ENDOCERVICAL CURETTAGE, COMBINED  2009    several, earliest was 1992     FUSION LUMBAR ANTERIOR ONE LEVEL  2013     intracranial shunt       LEEP TX, CERVICAL  1995     MASTOIDECTOMY  2003     OOPHORECTOMY Left           hydrochlorothiazide (HYDRODIURIL) 12.5 MG tablet       levothyroxine (SYNTHROID/LEVOTHROID) 125 MCG tablet       levothyroxine (SYNTHROID/LEVOTHROID) 137 MCG tablet       naproxen (NAPROSYN) 500 MG tablet       Prenatal Vit-Fe Fumarate-FA (PRENATAL MULTIVITAMIN  PLUS IRON) 27-0.8 MG TABS per tablet      Allergies   Allergen Reactions     Terazol [Terconazole] Hives     Family History  Family History   Problem Relation Age of Onset     Diabetes Maternal Grandmother      Hypertension Maternal Grandmother      Cancer Maternal Aunt      Cerebrovascular Disease No family hx of      Thyroid Disease No family hx of      Glaucoma No family hx of      Macular Degeneration No family hx of      Social History   Social History     Tobacco Use     Smoking status: Never Smoker     Smokeless tobacco: Never Used   Substance Use Topics     Alcohol use: No     Drug use: No      Past medical history, past surgical history, medications, allergies, family history, and social  "history were reviewed with the patient. No additional pertinent items.       Review of Systems  A complete review of systems was performed with pertinent positives and negatives noted in the HPI, and all other systems negative.    Physical Exam   BP: (!) 141/98  Pulse: 96  Temp: 98.3  F (36.8  C)  Resp: 16  Height: 157.5 cm (5' 2\")  Weight: 90.7 kg (200 lb)  SpO2: 97 %  Physical Exam  Vitals signs reviewed.   Constitutional:       General: She is not in acute distress.     Appearance: She is well-developed.   HENT:      Head: Normocephalic and atraumatic.      Mouth/Throat:      Mouth: Mucous membranes are moist.      Pharynx: No oropharyngeal exudate or posterior oropharyngeal erythema.   Eyes:      Extraocular Movements: Extraocular movements intact.      Conjunctiva/sclera: Conjunctivae normal.      Pupils: Pupils are equal, round, and reactive to light.   Neck:      Musculoskeletal: Normal range of motion and neck supple. No neck rigidity.   Cardiovascular:      Rate and Rhythm: Normal rate and regular rhythm.      Pulses: Normal pulses.      Heart sounds: Normal heart sounds. No murmur. No friction rub. No gallop.       Comments: 2+ radial ,DP and PT pulses  Pulmonary:      Effort: Pulmonary effort is normal. No respiratory distress.      Breath sounds: Normal breath sounds. No wheezing or rales.      Comments: No crepitus, ecchymosis.  Chest:      Chest wall: Tenderness (reproducible chest tenderness near the right sternal border) present.   Abdominal:      General: Bowel sounds are normal. There is no distension.      Palpations: Abdomen is soft. There is no mass.      Tenderness: There is no abdominal tenderness. There is no right CVA tenderness, left CVA tenderness, guarding or rebound.   Musculoskeletal: Normal range of motion.         General: No swelling, tenderness or deformity.      Comments: No bony tenderness of the right upper or right lower extremity.  Does report that movement of the right arm can " reproduce the discomfort in her chest.  Compartments are soft and compressible.  No midline thoracic or lumbar spine tenderness.  No asymmetric swelling noted or any swelling noted in the extremities.   Skin:     General: Skin is warm and dry.      Capillary Refill: Capillary refill takes less than 2 seconds.      Findings: No rash.   Neurological:      General: No focal deficit present.      Mental Status: She is alert and oriented to person, place, and time.      GCS: GCS eye subscore is 4. GCS verbal subscore is 5. GCS motor subscore is 6.      Cranial Nerves: No cranial nerve deficit.      Sensory: No sensory deficit.      Motor: No weakness.      Gait: Gait normal.      Comments: 5-5 strength in all 4 extremities bilaterally.  Sensation intact light touch in all 4 extremities bilaterally.  No saddle anesthesia.  Ambulating here without difficulty.   Psychiatric:         Mood and Affect: Mood normal.         ED Course      Procedures     6:35 PM  The patient was seen and examined by Stephanie Velarde MD in [facility].     Results for orders placed during the hospital encounter of 10/12/20   POC US ECHO LIMITED    Impression Limited Bedside Cardiac Ultrasound, performed and interpreted by me.   Indication: Chest Pain.  Parasternal long axis, parasternal short axis and subcostal views were acquired.   Image quality was satisfactory.    Findings:    Global left ventricular function appears intact.  Chambers do not appear dilated.  Possible very trace pericardial effusion however not seen on subxiphoid view.   No D sign.     IMPRESSION: Grossly normal left ventricular function and chamber size.  Possible very trace pericardial effusion. No sign of right heart strain.                EKG Interpretation:      Interpreted by Stephanie Velarde MD  Time reviewed: 7:35 pm  Symptoms at time of EKG: chest pain   Rhythm: normal sinus   Rate: normal  Axis: normal  Ectopy: none  Conduction: normal  ST Segments/ T Waves: No ST-T wave  changes. T wave inversion in lead III unchanged  Q Waves: none  Comparison to prior:Unchanged from 8/5/19    Clinical Impression: no signs of acute ischemia. No signs of pericarditis.                Results for orders placed or performed during the hospital encounter of 10/12/20   POC US ECHO LIMITED     Status: None    Impression    Limited Bedside Cardiac Ultrasound, performed and interpreted by me.   Indication: Chest Pain.  Parasternal long axis, parasternal short axis and subcostal views were acquired.   Image quality was satisfactory.    Findings:    Global left ventricular function appears intact.  Chambers do not appear dilated.  Possible very trace pericardial effusion however not seen on subxiphoid view.   No D sign.     IMPRESSION: Grossly normal left ventricular function and chamber size.  Possible very trace pericardial effusion. No sign of right heart strain.      US Upper Extremity Venous Duplex Right     Status: None    Narrative    EXAMINATION: US UPPER EXTREMITY VENOUS DUPLEX RIGHT, 10/12/2020 9:34  PM     COMPARISON: None.    HISTORY: hx of DVT/PE, right arm swelling and pain, eval for DVT    TECHNIQUE:  Gray-scale evaluation with compression, spectral flow and  color Doppler assessment of the deep venous system of the right upper  extremity.    FINDINGS:  Normal blood flow and waveforms are demonstrated in the right internal  jugular, innominate, subclavian, and axillary veins. There is normal  compressibility of the right brachial, basilar and cephalic veins.        Impression    IMPRESSION:  No evidence of right upper extremity deep venous thrombosis.    I have personally reviewed the examination and initial interpretation  and I agree with the findings.    MAGALY GARBER MD   US Lower Extremity Venous Duplex Right     Status: None    Narrative    EXAMINATION: US LOWER EXTREMITY VENOUS DUPLEX RIGHT, 10/12/2020 9:34  PM     COMPARISON: None.    HISTORY: right leg pain, hx of DVT/PE, eval  for DVT    TECHNIQUE:  Gray-scale evaluation with compression, spectral flow, and  color Doppler assessment of the deep venous system of the right leg  from groin to knee, and then at the ankle.    FINDINGS:  In the right lower extremity, the common femoral, femoral, popliteal,  and posterior tibial veins demonstrate normal compressibility and  blood flow.        Impression    IMPRESSION:  No evidence of right lower extremity deep venous thrombosis.    I have personally reviewed the examination and initial interpretation  and I agree with the findings.    MAGALY GARBER MD   XR Chest 2 Views     Status: None    Narrative    Exam: XR CHEST 2 VW, 10/12/2020 8:04 PM    Indication: chest pain, fall yesterday, eval for fracture, etc    Comparison: Chest radiograph 7/3/2004.    Findings:   PA and lateral views of the chest. Previously noted ventriculostomy  catheter is not not visualized.    No new focal airspace consolidation, pleural effusion or pneumothorax.  Cardiomediastinal silhouette is unchanged. No acute findings in the  upper abdomen.    Degenerative changes of the spine and shoulders. Glenohumeral and  acromioclavicular joints are congruent. No displaced rib fractures.      Impression    Impression:   1. No acute osseous pathology.  2. No acute airspace disease.    I have personally reviewed the examination and initial interpretation  and I agree with the findings.    MAGALY GARBER MD   Comprehensive metabolic panel     Status: None   Result Value Ref Range    Sodium 139 133 - 144 mmol/L    Potassium 3.9 3.4 - 5.3 mmol/L    Chloride 106 94 - 109 mmol/L    Carbon Dioxide 27 20 - 32 mmol/L    Anion Gap 6 3 - 14 mmol/L    Glucose 84 70 - 99 mg/dL    Urea Nitrogen 15 7 - 30 mg/dL    Creatinine 0.77 0.52 - 1.04 mg/dL    GFR Estimate >90 >60 mL/min/[1.73_m2]    GFR Estimate If Black >90 >60 mL/min/[1.73_m2]    Calcium 9.0 8.5 - 10.1 mg/dL    Bilirubin Total 0.2 0.2 - 1.3 mg/dL    Albumin 3.9 3.4 - 5.0  g/dL    Protein Total 7.8 6.8 - 8.8 g/dL    Alkaline Phosphatase 76 40 - 150 U/L    ALT 22 0 - 50 U/L    AST 15 0 - 45 U/L   Lipase     Status: None   Result Value Ref Range    Lipase 100 73 - 393 U/L   CBC with platelets differential     Status: None   Result Value Ref Range    WBC 9.3 4.0 - 11.0 10e9/L    RBC Count 4.28 3.8 - 5.2 10e12/L    Hemoglobin 13.8 11.7 - 15.7 g/dL    Hematocrit 42.1 35.0 - 47.0 %    MCV 98 78 - 100 fl    MCH 32.2 26.5 - 33.0 pg    MCHC 32.8 31.5 - 36.5 g/dL    RDW 13.2 10.0 - 15.0 %    Platelet Count 264 150 - 450 10e9/L    Diff Method Automated Method     % Neutrophils 67.1 %    % Lymphocytes 25.1 %    % Monocytes 5.1 %    % Eosinophils 1.9 %    % Basophils 0.6 %    % Immature Granulocytes 0.2 %    Nucleated RBCs 0 0 /100    Absolute Neutrophil 6.2 1.6 - 8.3 10e9/L    Absolute Lymphocytes 2.3 0.8 - 5.3 10e9/L    Absolute Monocytes 0.5 0.0 - 1.3 10e9/L    Absolute Eosinophils 0.2 0.0 - 0.7 10e9/L    Absolute Basophils 0.1 0.0 - 0.2 10e9/L    Abs Immature Granulocytes 0.0 0 - 0.4 10e9/L    Absolute Nucleated RBC 0.0    Troponin I     Status: None   Result Value Ref Range    Troponin I ES <0.015 0.000 - 0.045 ug/L   Troponin I     Status: None   Result Value Ref Range    Troponin I ES <0.015 0.000 - 0.045 ug/L   EKG 12-lead, tracing only     Status: None   Result Value Ref Range    Interpretation ECG Click View Image link to view waveform and result      Medications   aspirin (ASA) chewable tablet 243 mg (243 mg Oral Given 10/12/20 2027)   morphine (PF) injection 4 mg (4 mg Intravenous Given 10/12/20 2120)   ondansetron (ZOFRAN) injection 4 mg (4 mg Intravenous Given 10/12/20 2029)        Assessments & Plan (with Medical Decision Making)   Patient presents with multiple different complaints.  It sounds as though she has had ongoing issues with right-sided chest pain in a pinpoint area near her sternum and had been worked up at outside care facility with a CT scan performed on 5 October that  "did not reveal any sign of PE which was her main concern is she has history of this.  There was a trace pericardial effusion and she was post to follow-up with cardiology today for this however apparently missed her appointment and was recommended to come here instead.  She is denying any significant change in this pain over the past 3 weeks but does report now it has been more constant over the last week.  It does not appear to be associated with exertion and does appear somewhat musculoskeletal in nature as it is somewhat worse when she moves her right arm and is reproducible on exam.  She also states she has had some swelling in her right arm however I do not note this on exam currently.  She has had longstanding issues with some pain in her right leg for about the last year.  Her main concern is the \"fluid around her heart.\"  She did also have a fall a couple of days ago.  With this I did discuss performing a chest x-ray just to ensure there is no sign of obvious rib fracture or other change in abnormality in the last week.  I do not feel that PE is the cause of this especially as she had a reassuring CT scan done in the past week  With her concern for prior history of PE and her report of right arm swelling even though I do not obviously see this on exam, we will obtain a right upper extremity venous ultrasound to evaluate for DVT.  With her leg pain we also obtained a right lower extremity ultrasound to evaluate for DVT.  She has good 2+ pedal and radial pulses bilaterally and thus we felt that arterial occlusion would be unlikely.  She has no neck or back pain however the chronic right leg pain could be some referred pain related to prior back issues.  She is ambulating here without difficulty and has no focal neurologic deficit.  I also have very low suspicion for aortic dissection as this has been ongoing for 3 weeks and had a recent reassuring CT scan and does not have neurologic deficit associated with " this chest pain.  I do feel that pericarditis is a possibility with the trace fluid.  We also considered costochondritis.  Also considered cardiac etiologies such as acute coronary syndrome.  EKG was obtained which did not reveal any evidence of acute ischemia or pericarditis.  We did obtain 2 troponins which were both negative and reassuring and is is been ongoing and constant for a week we felt that acute coronary syndrome would be unlikely at this time.  Chest x-ray was unremarkable.  Ultrasound also did not show any signs of DVT.  Laboratory study was otherwise unremarkable as above.    Vital signs here are within normal limits with exception of some mild hypertension of 141/98 originally however this resolved spontaneously.  She had no signs of respiratory distress.  She was given morphine and Zofran as well aspirin.  She was feeling improved with this.  I did perform a bedside ultrasound to evaluate for pericardial effusion.  This revealed possibly a very trace effusion without any signs of tamponade and no signs of right heart strain.  Function was grossly normal on exam.  No obvious wall motion abnormalities.  We advised that she could take ibuprofen every 6 hours as needed for pain as this may potentially be related to some pericarditis.  We also discussed that she should follow-up closely with her primary care provider as well as cardiology as previously scheduled to discuss any further cardiac testing.  She was given indications for return emergency department.  She voiced understanding and was comfortable with this plan and was requesting discharge.  She was discharged in stable condition.    I have reviewed the nursing notes. I have reviewed the findings, diagnosis, plan and need for follow up with the patient.    Discharge Medication List as of 10/12/2020 11:20 PM          Final diagnoses:   Chest pain, unspecified type       --  I, Luigi Gallardo, am serving as a trained medical scribe to document  services personally performed by Stephanie Velarde MD, based on the provider's statements to me.     I, Stephanie Velarde MD, was physically present and have reviewed and verified the accuracy of this note documented by Luigi Gallardo.    Stephanie Velarde MD  [facility] EMERGENCY DEPARTMENT  10/12/2020     Stephanie Velarde MD  11/12/20 [id number]

## 2020-10-12 NOTE — ED TRIAGE NOTES
Pt arrives to ED with complaints of chest pain that radiates down her right arm. Pt reports this has been going on for 3 weeks. PT reports she was seen last Monday at urgent care. Pt had CT done at that time and had some increased fluid around her heart per pt.

## 2020-10-13 ENCOUNTER — OFFICE VISIT (OUTPATIENT)
Dept: FAMILY MEDICINE | Facility: CLINIC | Age: 50
End: 2020-10-13
Payer: COMMERCIAL

## 2020-10-13 VITALS
HEART RATE: 84 BPM | SYSTOLIC BLOOD PRESSURE: 124 MMHG | DIASTOLIC BLOOD PRESSURE: 80 MMHG | BODY MASS INDEX: 45.73 KG/M2 | WEIGHT: 250 LBS | OXYGEN SATURATION: 98 % | TEMPERATURE: 98.4 F

## 2020-10-13 DIAGNOSIS — I31.39 PERICARDIAL EFFUSION: ICD-10-CM

## 2020-10-13 DIAGNOSIS — I10 HTN, GOAL BELOW 140/90: ICD-10-CM

## 2020-10-13 DIAGNOSIS — R07.89 CHEST WALL PAIN: Primary | ICD-10-CM

## 2020-10-13 LAB — INTERPRETATION ECG - MUSE: NORMAL

## 2020-10-13 PROCEDURE — 99214 OFFICE O/P EST MOD 30 MIN: CPT | Performed by: FAMILY MEDICINE

## 2020-10-13 RX ORDER — NAPROXEN 500 MG/1
500 TABLET ORAL 2 TIMES DAILY WITH MEALS
Qty: 30 TABLET | Refills: 0 | Status: SHIPPED | OUTPATIENT
Start: 2020-10-13 | End: 2021-01-13

## 2020-10-13 NOTE — DISCHARGE INSTRUCTIONS
Please make an appointment to follow up with Your Primary Care Provider as soon as possible to discuss outpatient cardiac testing.    Can take ibuprofen every 6 hours for pain.     Return to the ED if you develop worsening pain, shortness of breath, fever, numbness, tingling, weakness, abdominal pain, worsening arm or leg swelling, or any new or worsening concerns.

## 2020-10-13 NOTE — PROGRESS NOTES
Subjective     Eunice Barrientos is a 50 year old female who presents to clinic today for the following health issues:    HPI         ED/UC Followup:    Facility:  [facility] ER  Date of visit: 10/12/20  Reason for visit: Chest pain  Current Status: same     Chest Pain:   Pain is in the mid upper right chest; localized    Felt like blood clot pain she had with a PE from a central line.    Eunice Barrientos is a 50 year old female with a medical history significant for hypertension, PE (not on anticoagulation), hydrocephalus s/p  shunt, hypothyroidism and is s/p anterior lumbar fusion (2013) who presents to the Emergency Department for evaluation of right-sided chest pain.  Patient points to a specific area in her right chest that she states has been bothering her for the last 3 weeks.  Patient reports that she was concerned as this is the same pain and in the same place that she had when she was diagnosed with a PE many years ago.  Per review of patient's chart, patient was seen at [facility] on 10/5/2020 for this pain.  Patient had a CT chest PE done that was negative for PE, but did show trace pericardial effusion.  Patient was advised to have an echocardiogram done for further evaluation    EKG: Non specific changes    Troponins: -ve x2     Pericardial effusion on CT  Incidental finding on CT.  CT from August 2019 did not have any pericardial effusion.    HTN:  Blood pressure been high a few times, especially when in pain  BP Readings from Last 6 Encounters:   10/13/20 124/80   10/12/20 120/86   10/12/20 (!) 150/90   08/05/19 118/88   07/02/19 130/88   06/12/19 (!) 140/104     Obesity:  She is starting to workout in the gym, has gained a lot of weight; which from the ER yesterday seems a little out  Wt Readings from Last 4 Encounters:   10/13/20 113.4 kg (250 lb)   10/12/20 90.7 kg (200 lb)   08/05/19 110.7 kg (244 lb)   06/12/19 110.2 kg (243 lb)       Review of Systems   Constitutional, HEENT,  "cardiovascular, pulmonary, gi and gu systems are negative, except as otherwise noted.      Objective    /80   Pulse 84   Temp 98.4  F (36.9  C) (Oral)   Wt 113.4 kg (250 lb)   SpO2 98%   BMI 45.73 kg/m    Body mass index is 45.73 kg/m .  Physical Exam   GENERAL: healthy, alert and no distress  NECK: no adenopathy and thyroid normal to palpation  CHEST: Tenderness in the right upper chest the pectoral area.  Right arm with limited range of motion especially abduction to about 90 degrees  RESP: lungs clear to auscultation - no rales, rhonchi or wheezes  CV: regular rate and rhythm, normal S1 S2, no S3 or S4, no murmur, click or rub, no peripheral edema  MS: no gross musculoskeletal defects noted, no edema    Assessment & Plan     Eunice was seen today for er f/u.    Diagnoses and all orders for this visit:    Chest wall pain: Rt upper  Discussed differentials; chest wall pain, costochondritis, pleuritis.  Chest x-ray from yesterday and CT scan do not show any acute bony abnormalities.  Mostly muscular or costochondritis.  Will do NSAID, as well as exercise and heat.  -     Echocardiogram Complete; Future  -     naproxen (NAPROSYN) 500 MG tablet; Take 1 tablet (500 mg) by mouth 2 times daily (with meals)    Pericardial effusion  This is new in the last year; as previous CT did not show any effusion.  Evaluate with an echocardiogram.  -     Echocardiogram Complete; Future    HTN, goal below 140/90      -Blood pressure well controlled today, had spikes when in pain.    BMI:   Estimated body mass index is 45.73 kg/m  as calculated from the following:    Height as of 10/12/20: 1.575 m (5' 2\").    Weight as of this encounter: 113.4 kg (250 lb).   Weight management plan: Discussed healthy diet and exercise guidelines    Return for follow up with results.    Lorenzo Hercules MD  [facility]    "

## 2020-10-22 ENCOUNTER — ANCILLARY PROCEDURE (OUTPATIENT)
Dept: CARDIOLOGY | Facility: CLINIC | Age: 50
End: 2020-10-22
Attending: FAMILY MEDICINE
Payer: COMMERCIAL

## 2020-10-22 DIAGNOSIS — I31.39 PERICARDIAL EFFUSION: ICD-10-CM

## 2020-10-22 DIAGNOSIS — R07.89 CHEST WALL PAIN: ICD-10-CM

## 2020-10-22 PROCEDURE — 93306 TTE W/DOPPLER COMPLETE: CPT | Performed by: STUDENT IN AN ORGANIZED HEALTH CARE EDUCATION/TRAINING PROGRAM

## 2020-10-23 DIAGNOSIS — E03.9 ACQUIRED HYPOTHYROIDISM: ICD-10-CM

## 2020-10-23 RX ORDER — LEVOTHYROXINE SODIUM 125 UG/1
TABLET ORAL
Qty: 15 TABLET | Refills: 0 | OUTPATIENT
Start: 2020-10-23

## 2020-10-23 RX ORDER — LEVOTHYROXINE SODIUM 137 UG/1
TABLET ORAL
Qty: 15 TABLET | Refills: 0 | OUTPATIENT
Start: 2020-10-23

## 2020-11-04 ENCOUNTER — TELEPHONE (OUTPATIENT)
Dept: FAMILY MEDICINE | Facility: CLINIC | Age: 50
End: 2020-11-04

## 2020-11-04 NOTE — TELEPHONE ENCOUNTER
This needs a visit with provider to address  Can be virtual visit but explain that provider may want labs     Ernestine Bui RN

## 2020-11-04 NOTE — TELEPHONE ENCOUNTER
Called and left a message for Eunice, with the RN's message. Natasha Ahmadi,     Please help patient get scheduled for a virtual visit. Natasha Ahmadi,

## 2020-11-09 ENCOUNTER — TELEPHONE (OUTPATIENT)
Dept: FAMILY MEDICINE | Facility: CLINIC | Age: 50
End: 2020-11-09

## 2020-11-09 NOTE — TELEPHONE ENCOUNTER
Reason for call:  Other   Patient called regarding (reason for call): call back  Additional comments: Patient is calling to get some medicine regarding a yeast infection. Please call back to discuss.    Phone number to reach patient:  Home number on file [phone] (home)    Best Time:  Any     Can we leave a detailed message on this number?  YES    Travel screening: Negative

## 2020-11-10 RX ORDER — CEPHALEXIN 500 MG/1
500 CAPSULE ORAL 2 TIMES DAILY
COMMUNITY
Start: 2020-11-09 | End: 2020-11-16

## 2020-11-10 NOTE — PROGRESS NOTES
"Eunice Barrientos is a 50 year old female who is being evaluated via a billable video visit.      The patient has been notified of following:     \"This video visit will be conducted via a call between you and your physician/provider. We have found that certain health care needs can be provided without the need for an in-person physical exam.  This service lets us provide the care you need with a video conversation.  If a prescription is necessary we can send it directly to your pharmacy.  If lab work is needed we can place an order for that and you can then stop by our lab to have the test done at a later time.    Video visits are billed at different rates depending on your insurance coverage.  Please reach out to your insurance provider with any questions.    If during the course of the call the physician/provider feels a video visit is not appropriate, you will not be charged for this service.\"    Patient has given verbal consent for Video visit? Yes  How would you like to obtain your AVS? Mail a copy  If you are dropped from the video visit, the video invite should be resent to: Text to cell phone: [phone]  Will anyone else be joining your video visit? No    Subjective     Eunice Barrientos is a 50 year old female who presents today via video visit for the following health issues:    HPI     Patient presents with:  RECHECK: chronic pain in right side of chest. pain symptoms is about the same but sometimes it does it worse  States the pain started over a month ago, continuous. Worse when laying on her right side. Not effected by activity or rest. Is taking one Naproxen prior to going to bed. Denies new headaches, fevers, chills, vomiting. Has had nausea and diarrhea on and off for the past two week. States that she has been having burning in the lower right leg from the knee down and difficulty lifting her foot due to pain and weakness, worsening for the past 8 months.  States that she has not had her shunt " "checked in several years.      From ED visit 10/12/2020:  \"Eunice Barrientos is a 50 year old female with a medical history significant for hypertension, PE (not on anticoagulation), hydrocephalus s/p  shunt, hypothyroidism and is s/p anterior lumbar fusion (2013) who presents to the Emergency Department for evaluation of right-sided chest pain.  Patient points to a specific area in her right chest that she states has been bothering her for the last 3 weeks.  Patient reports that she was concerned as this is the same pain and in the same place that she had when she was diagnosed with a PE many years ago.  Per review of patient's chart, patient was seen at [facility] on 10/5/2020 for this pain.  Patient had a CT chest PE done that was negative for PE, but did show trace pericardial effusion.  Patient was advised to have an echocardiogram done for further evaluation\"    Office visit with Dr. Gutierrez 10/13/2020, ordered echocardiogram and started on Naproxen.            Video Start Time: 1210      Review of Systems   Constitutional, HEENT, cardiovascular, pulmonary, gi and gu systems are negative, except as otherwise noted.      Objective           Vitals:  No vitals were obtained today due to virtual visit.    Physical Exam     GENERAL: Healthy, alert and no distress  EYES: Eyes grossly normal to inspection.  No discharge or erythema, or obvious scleral/conjunctival abnormalities.  RESP: No audible wheeze, cough, or visible cyanosis.  No visible retractions or increased work of breathing.    SKIN: Visible skin clear. No significant rash, abnormal pigmentation or lesions.  NEURO: Cranial nerves grossly intact.  Mentation and speech appropriate for age.  PSYCH: Mentation appears normal, affect normal/bright, judgement and insight intact, normal speech and appearance well-groomed.    EKG: Non specific changes     Troponins: -ve x2     Pericardial effusion on CT  Incidental finding on CT.  CT from August 2019 " did not have any pericardial effusion.    Echocardiogram 10/22/2020  LVEF 55-60%, no pericardial effusion present, no valvular pathologies           Assessment & Plan     Chest wall pain: Rt upper  Atypical chest pain, continuous and worse with laying on the right side. Has had nausea and diarrhea off and on. CT at ED showed minimal pericardial effusion and follow up echocardiogram showed effusion resolved. Differentials include musculoskeletal pain, resolving pericarditis, autoimmune, and infection. Reassured patient that workup to date is very reassuring. Counseled that she should take her naproxen twice daily with food for the next 7 days.  - CBC with platelets differential; Future  - Erythrocyte sedimentation rate auto; Future  - CRP inflammation; Future    Nausea  - CBC with platelets differential; Future  - Erythrocyte sedimentation rate auto; Future  - CRP inflammation; Future    Neuropathy of right lower extremity  Right lower leg neuropathy and foot weakness, worsening over the past 8 months.  - NEUROLOGY ADULT REFERRAL    Intracranial shunt  She reports that she has not had her shunt checked in several years  - NEUROLOGY ADULT REFERRAL      Return in about 1 week (around 11/18/2020), or or sooner if symptoms worsen or fail to improve, for Follow up clinic visit. Needs lab only appointment asap. .    ABIOLA Lanier CNP  [facility]      Video-Visit Details    Type of service:  Video Visit    Video End Time:12:24 PM    Originating Location (pt. Location): Home    Distant Location (provider location):  [facility]     Platform used for Video Visit: Ge

## 2020-11-11 ENCOUNTER — VIRTUAL VISIT (OUTPATIENT)
Dept: FAMILY MEDICINE | Facility: CLINIC | Age: 50
End: 2020-11-11
Payer: COMMERCIAL

## 2020-11-11 DIAGNOSIS — R11.0 NAUSEA: ICD-10-CM

## 2020-11-11 DIAGNOSIS — G57.91 NEUROPATHY OF RIGHT LOWER EXTREMITY: ICD-10-CM

## 2020-11-11 DIAGNOSIS — R07.89 CHEST WALL PAIN: Primary | ICD-10-CM

## 2020-11-11 DIAGNOSIS — Z98.2 INTRACRANIAL SHUNT: ICD-10-CM

## 2020-11-11 PROCEDURE — 99214 OFFICE O/P EST MOD 30 MIN: CPT | Mod: 95 | Performed by: NURSE PRACTITIONER

## 2020-11-13 NOTE — TELEPHONE ENCOUNTER
RECORDS RECEIVED FROM:   APPT NOTES: Neuropathy of right lower extremity [G57.91]  Intracranial shunt    Date of Appt: 11.18.20   NOTES (FOR ALL VISITS) STATUS DETAILS   OFFICE NOTE from referring provider Internal 11.11.20 [facility]    OFFICE NOTE from other specialist N/A    DISCHARGE SUMMARY from hospital N/A    DISCHARGE REPORT from the ER N/A    OPERATIVE REPORT N/A    MEDICATION LIST Internal    IMAGING  (FOR ALL VISITS)     EMG N/A    EEG N/A    LUMBAR PUNCTURE N/A    EDVIN SCAN N/A    DEXA SCAN *NEUROSURGERY* N/A    ULTRASOUND (CAROTID BILAT) *VASCULAR* Internal 10.12.20 US lower extremity      MRI (HEAD, NECK, SPINE) Received 6.8.18 Brain, [facility]   XRAY (SPINE) *NEUROSURGERY* Internal 6.12.19 lumbar spine   CT (HEAD, NECK, SPINE) Internal 7.1.16 head      Action MJ 11.13.20 MJ   Action Taken Called [facility] to have image pushed over.      Imaging Received  11/16/20 MV 12.53pm  [facility]   Image Type (x): Disc:   PACS: x   Exam Date/Name MRI Brain 6/8/18 Comments: images resolved in PACS

## 2020-11-17 ENCOUNTER — DOCUMENTATION ONLY (OUTPATIENT)
Dept: CARE COORDINATION | Facility: CLINIC | Age: 50
End: 2020-11-17

## 2020-11-18 ENCOUNTER — OFFICE VISIT (OUTPATIENT)
Dept: NEUROLOGY | Facility: CLINIC | Age: 50
End: 2020-11-18
Attending: NURSE PRACTITIONER
Payer: COMMERCIAL

## 2020-11-18 ENCOUNTER — PRE VISIT (OUTPATIENT)
Dept: NEUROLOGY | Facility: CLINIC | Age: 50
End: 2020-11-18

## 2020-11-18 VITALS
DIASTOLIC BLOOD PRESSURE: 94 MMHG | WEIGHT: 255 LBS | RESPIRATION RATE: 16 BRPM | BODY MASS INDEX: 46.93 KG/M2 | OXYGEN SATURATION: 98 % | HEART RATE: 91 BPM | SYSTOLIC BLOOD PRESSURE: 143 MMHG | HEIGHT: 62 IN

## 2020-11-18 DIAGNOSIS — G89.29 CHRONIC BILATERAL LOW BACK PAIN WITH RIGHT-SIDED SCIATICA: Primary | ICD-10-CM

## 2020-11-18 DIAGNOSIS — Z98.2 VENTRICULAR SHUNT IN PLACE: ICD-10-CM

## 2020-11-18 DIAGNOSIS — M54.41 CHRONIC BILATERAL LOW BACK PAIN WITH RIGHT-SIDED SCIATICA: Primary | ICD-10-CM

## 2020-11-18 PROCEDURE — 99204 OFFICE O/P NEW MOD 45 MIN: CPT | Mod: GC | Performed by: PSYCHIATRY & NEUROLOGY

## 2020-11-18 ASSESSMENT — MIFFLIN-ST. JEOR: SCORE: 1739.92

## 2020-11-18 ASSESSMENT — PAIN SCALES - GENERAL: PAINLEVEL: MODERATE PAIN (5)

## 2020-11-18 NOTE — LETTER
"11/18/2020       RE: Eunice Barrientos  [address] [place] [facility] [address]     Dear Colleague,    Thank you for referring your patient, Eunice Barrientos, to the [facility] [place] at [facility]. Please see a copy of my visit note below.    Neuromuscular Consult Note    Chief Complaint: Right leg pain    History of Present Illness:    Ms. Barrientos is a 50 year old woman with a h/o idiopathic intracranial hypertension s/p  shunt and anterior/posterior fusion at L5-S1 who presents for evaluation of right leg pain.  The pain in her right leg for started in April 2019.  The pain is located in the right buttock and will radiate down her right leg.  The pain is intermittent and worse with standing and improves with sitting.  She also has intermittent right-sided low back pain and will worsen with coughing and sneezing.  She describes intermittent paresthesias and burning pain below the right knee mostly on the lateral aspects of the leg.  She has taken naproxen during the day which does help.  She has not tried physical therapy or steroid injections.  No prior EMG.  She denied any bowel or bladder changes.  She has noted some difficulty with walking is occasionally painful to bear weight.  She denies any falls or weakness in the lower limbs. Of note she stated she had a vertebral fracture in her lumbar spine in 2009 and is now status post fusion L5-S1.      She does have a history of idiopathic intracranial hypertension status post  shunt which was placed in 2003.  She underwent revision in 2005 due to infection.  She stated she wanted to follow-up with a neurosurgeon to make sure the shunt is stable.  She denied any new headache or visual changes.    Prior pertinent laboratory work-up:  None    Prior pertinent imaging work-up:    06/19  XR lumbar spine: \"status post an anterior and posterior fusion at L5-S1. Anterior interbody fusion device is in place. " "Posterior instrumentation with pedicle screws is noted. Posterior decompression.  There is mild narrowing of the L4-5 disc space. The upper lumbar discs are normal.\"    06/18  MRI brain w/o contrast: \"stable left frontal approach ventriculostomy catheter, morphology and size of the ventricles\"    Prior electrophysiologic work-up:  None    Past Medical History:   Past Medical History:   Diagnosis Date     History of blood transfusion      History of DVT (deep vein thrombosis)     related to port     Hydrocephalus (H)      Hypertension goal BP (blood pressure) < 140/90      Macular degeneration (senile) of retina 7/6/2017     Thyroid disease        Past Surgical History:  Past Surgical History:   Procedure Laterality Date     CHOLECYSTECTOMY       COLPOSCOPY CERVIX, BIOPSY CERVIX, ENDOCERVICAL CURETTAGE, COMBINED  2009    several, earliest was 1992     FUSION LUMBAR ANTERIOR ONE LEVEL  2013     intracranial shunt       LEEP TX, CERVICAL  1995     MASTOIDECTOMY  2003     OOPHORECTOMY Left      Family history:    There is no known family history of neuromuscular disorders.     Social History:    Pt denies tobacco, alcohol, or illicit drug use. There is no known exposure to toxins or heavy metals.     Medical Allergies:    Allergies   Allergen Reactions     Terazol [Terconazole] Hives     Current Medications:     Current Outpatient Medications:      hydrochlorothiazide (HYDRODIURIL) 12.5 MG tablet, Take 1 tablet (12.5 mg) by mouth daily, Disp: 90 tablet, Rfl: 1     levothyroxine (SYNTHROID/LEVOTHROID) 125 MCG tablet, TAKE 1 TABLET BY MOUTH EVERY OTHER DAY ALTERNATING  WITH  137  MCG  TABLETS, Disp: 45 tablet, Rfl: 2     levothyroxine (SYNTHROID/LEVOTHROID) 137 MCG tablet, TAKE 1 TABLET BY MOUTH EVERY OTHER DAY ALTERNATING  WITH  125MCG  TABLETS, Disp: 45 tablet, Rfl: 2     naproxen (NAPROSYN) 500 MG tablet, Take 1 tablet (500 mg) by mouth 2 times daily (with meals), Disp: 30 tablet, Rfl: 0     Prenatal Vit-Fe Fumarate-FA " "(PRENATAL MULTIVITAMIN  PLUS IRON) 27-0.8 MG TABS per tablet, Take 1 tablet by mouth daily, Disp: , Rfl:      Review of Systems: A complete review of systems was obtained and was negative except for what was noted above.    Physical examination:    BP (!) 143/94   Pulse 91   Resp 16   Ht 1.575 m (5' 2\")   Wt 115.7 kg (255 lb)   SpO2 98%   BMI 46.64 kg/m    General Appearance: NAD    Skin: There are no rashes or other skin lesions.    Musculoskeletal:  There is no scoliosis, lordosis, kyphosis, pes cavus, or hammertoes. Positive straight leg raise on the right.  Right shoulder tender to palpation.    Neurologic examination:    Mental status:  Patient is alert, attentive, and oriented x 3.  Language is coherent and fluent without aphasia.  Memory, comprehension and ability to follow commands were intact.       Cranial nerves: Pupils were round and reacted to light.  Extraocular movements were full. There was no face, jaw, palate or tongue weakness or atrophy. Hearing was grossly intact.  Shoulder shrug was normal.      Motor exam: No atrophy or fasciculations.  Manual muscle testing revealed the following MRC grade muscle power:   Right Left   Neck flexion 5    Neck extension 5    Shoulder ext rotation 5* 5   Shoulder abduction 5* 5   Elbow extension 5 5   Elbow flexion  5 5   Wrist extension 5 5   Wrist flexion 5 5   Finger extension 5 5   Finger flexion 5 5   FDI 5 5   APB 5 5   Hip flexion 5 5   Knee extension 5 5   Knee flexion 5 5   Dorsiflexion 5 5   Plantarflexion 5 5   Eversion 5 5   Inversion 5 5   Ext Hallucis Longus 5 5   *Limited due to pain    Complex motor skills: No tremor or ataxia     Sensory exam revealed normal vibration, proprioception, and PP.     Gait was normal.  Pt was able to walk on heels, toes and tandem without any difficulty.       Deep tendon reflexes:   Right Left   Triceps 2 2   Biceps 2 2   Brachioradialis 2 2   Knee jerk 2 2   Ankle jerk 2 2   Plantar responses were flexor " bilaterally. Acuna's negative b/l      Assessment:    Ms. Barrientos is a 50 year old woman with a h/o idiopathic intracranial hypertension s/p  shunt and anterior/posterior fusion at L5-S1 who presents for evaluation of right leg pain.  Overall history and exam with positive straight leg raise test is most consistent with a right lumbar radiculopathy.  We would recommend starting physical therapy and will further evaluate with an EMG of the right lower limb.  MRI of the lumbar spine will be of limited benefit as there will likely be a degree of artifact from prior hardware.  Of note she did have right shoulder pain on examination and would recommend follow-up with her PCP for further evaluation.  She is interested in a neurosurgery referral for evaluation and management of her  shunt.  We would be happy to place that consult for her.    Plan:      1. Nerve conduction studies/EMG of RLE  2. Physical therapy   3. NSGY referral for intracranial shunt  4. Follow up with PCP for right shoulder pain  5. Will discuss results of EMG when available. Pending results and if back pain persist with PT will refer to spine for additional work up and treatment.      Patient seen and examined with Dr. Monique. His addendum follows.     Keith Martinez MD  Neuromuscular Fellow  ---  ADDENDUM:  I personally interviewed and examined the patient. I agree with the history, physical, assessment, and plan as documented above.     Matt Monique MD

## 2020-11-18 NOTE — PROGRESS NOTES
"Neuromuscular Consult Note    Chief Complaint: Right leg pain    History of Present Illness:    Ms. Barrientos is a 50 year old woman with a h/o idiopathic intracranial hypertension s/p  shunt and anterior/posterior fusion at L5-S1 who presents for evaluation of right leg pain.  The pain in her right leg for started in April 2019.  The pain is located in the right buttock and will radiate down her right leg.  The pain is intermittent and worse with standing and improves with sitting.  She also has intermittent right-sided low back pain and will worsen with coughing and sneezing.  She describes intermittent paresthesias and burning pain below the right knee mostly on the lateral aspects of the leg.  She has taken naproxen during the day which does help.  She has not tried physical therapy or steroid injections.  No prior EMG.  She denied any bowel or bladder changes.  She has noted some difficulty with walking is occasionally painful to bear weight.  She denies any falls or weakness in the lower limbs. Of note she stated she had a vertebral fracture in her lumbar spine in 2009 and is now status post fusion L5-S1.      She does have a history of idiopathic intracranial hypertension status post  shunt which was placed in 2003.  She underwent revision in 2005 due to infection.  She stated she wanted to follow-up with a neurosurgeon to make sure the shunt is stable.  She denied any new headache or visual changes.    Prior pertinent laboratory work-up:  None    Prior pertinent imaging work-up:    06/19  XR lumbar spine: \"status post an anterior and posterior fusion at L5-S1. Anterior interbody fusion device is in place. Posterior instrumentation with pedicle screws is noted. Posterior decompression.  There is mild narrowing of the L4-5 disc space. The upper lumbar discs are normal.\"    06/18  MRI brain w/o contrast: \"stable left frontal approach ventriculostomy catheter, morphology and size of the ventricles\"    Prior " electrophysiologic work-up:  None    Past Medical History:   Past Medical History:   Diagnosis Date     History of blood transfusion      History of DVT (deep vein thrombosis)     related to port     Hydrocephalus (H)      Hypertension goal BP (blood pressure) < 140/90      Macular degeneration (senile) of retina 7/6/2017     Thyroid disease        Past Surgical History:  Past Surgical History:   Procedure Laterality Date     CHOLECYSTECTOMY       COLPOSCOPY CERVIX, BIOPSY CERVIX, ENDOCERVICAL CURETTAGE, COMBINED  2009    several, earliest was 1992     FUSION LUMBAR ANTERIOR ONE LEVEL  2013     intracranial shunt       LEEP TX, CERVICAL  1995     MASTOIDECTOMY  2003     OOPHORECTOMY Left      Family history:    There is no known family history of neuromuscular disorders.     Social History:    Pt denies tobacco, alcohol, or illicit drug use. There is no known exposure to toxins or heavy metals.     Medical Allergies:    Allergies   Allergen Reactions     Terazol [Terconazole] Hives     Current Medications:     Current Outpatient Medications:      hydrochlorothiazide (HYDRODIURIL) 12.5 MG tablet, Take 1 tablet (12.5 mg) by mouth daily, Disp: 90 tablet, Rfl: 1     levothyroxine (SYNTHROID/LEVOTHROID) 125 MCG tablet, TAKE 1 TABLET BY MOUTH EVERY OTHER DAY ALTERNATING  WITH  137  MCG  TABLETS, Disp: 45 tablet, Rfl: 2     levothyroxine (SYNTHROID/LEVOTHROID) 137 MCG tablet, TAKE 1 TABLET BY MOUTH EVERY OTHER DAY ALTERNATING  WITH  125MCG  TABLETS, Disp: 45 tablet, Rfl: 2     naproxen (NAPROSYN) 500 MG tablet, Take 1 tablet (500 mg) by mouth 2 times daily (with meals), Disp: 30 tablet, Rfl: 0     Prenatal Vit-Fe Fumarate-FA (PRENATAL MULTIVITAMIN  PLUS IRON) 27-0.8 MG TABS per tablet, Take 1 tablet by mouth daily, Disp: , Rfl:      Review of Systems: A complete review of systems was obtained and was negative except for what was noted above.    Physical examination:    BP (!) 143/94   Pulse 91   Resp 16   Ht 1.575 m (5'  "2\")   Wt 115.7 kg (255 lb)   SpO2 98%   BMI 46.64 kg/m    General Appearance: NAD    Skin: There are no rashes or other skin lesions.    Musculoskeletal:  There is no scoliosis, lordosis, kyphosis, pes cavus, or hammertoes. Positive straight leg raise on the right.  Right shoulder tender to palpation.    Neurologic examination:    Mental status:  Patient is alert, attentive, and oriented x 3.  Language is coherent and fluent without aphasia.  Memory, comprehension and ability to follow commands were intact.       Cranial nerves: Pupils were round and reacted to light.  Extraocular movements were full. There was no face, jaw, palate or tongue weakness or atrophy. Hearing was grossly intact.  Shoulder shrug was normal.      Motor exam: No atrophy or fasciculations.  Manual muscle testing revealed the following MRC grade muscle power:   Right Left   Neck flexion 5    Neck extension 5    Shoulder ext rotation 5* 5   Shoulder abduction 5* 5   Elbow extension 5 5   Elbow flexion  5 5   Wrist extension 5 5   Wrist flexion 5 5   Finger extension 5 5   Finger flexion 5 5   FDI 5 5   APB 5 5   Hip flexion 5 5   Knee extension 5 5   Knee flexion 5 5   Dorsiflexion 5 5   Plantarflexion 5 5   Eversion 5 5   Inversion 5 5   Ext Hallucis Longus 5 5   *Limited due to pain    Complex motor skills: No tremor or ataxia     Sensory exam revealed normal vibration, proprioception, and PP.     Gait was normal.  Pt was able to walk on heels, toes and tandem without any difficulty.       Deep tendon reflexes:   Right Left   Triceps 2 2   Biceps 2 2   Brachioradialis 2 2   Knee jerk 2 2   Ankle jerk 2 2   Plantar responses were flexor bilaterally. Acuna's negative b/l      Assessment:    Ms. Barrientos is a 50 year old woman with a h/o idiopathic intracranial hypertension s/p  shunt and anterior/posterior fusion at L5-S1 who presents for evaluation of right leg pain.  Overall history and exam with positive straight leg raise test is " most consistent with a right lumbar radiculopathy.  We would recommend starting physical therapy and will further evaluate with an EMG of the right lower limb.  MRI of the lumbar spine will be of limited benefit as there will likely be a degree of artifact from prior hardware.  Of note she did have right shoulder pain on examination and would recommend follow-up with her PCP for further evaluation.  She is interested in a neurosurgery referral for evaluation and management of her  shunt.  We would be happy to place that consult for her.    Plan:      1. Nerve conduction studies/EMG of RLE  2. Physical therapy   3. NSGY referral for intracranial shunt  4. Follow up with PCP for right shoulder pain  5. Will discuss results of EMG when available. Pending results and if back pain persist with PT will refer to spine for additional work up and treatment.      Patient seen and examined with Dr. Monique. His addendum follows.     Keith Martinez MD  Neuromuscular Fellow  ---  ADDENDUM:  I personally interviewed and examined the patient. I agree with the history, physical, assessment, and plan as documented above.     Matt Monique MD

## 2020-11-18 NOTE — NURSING NOTE
Chief Complaint   Patient presents with     Consult     [facility] - Neuropathy of lower right extremity, intracranial shunt     Alex Josue

## 2020-11-22 ENCOUNTER — PRE VISIT (OUTPATIENT)
Dept: NEUROSURGERY | Facility: CLINIC | Age: 50
End: 2020-11-22

## 2020-11-22 NOTE — TELEPHONE ENCOUNTER
FUTURE VISIT INFORMATION      FUTURE VISIT INFORMATION:    Date: 11/24/2020    Time: 230pm    Location: [facility]  REFERRAL INFORMATION:    Referring provider:  Dr. Monique     Referring providers clinic:  [facility] Neuromuscular     Reason for visit/diagnosis  Ventricular Shunt     RECORDS REQUESTED FROM:       Clinic name Comments Records Status Imaging Status   Internal Dr. Monique-11/18/2020 Epic N/A          [facility]  MR Brain 6/8/2018 Requested report PACS                        11/22/2020-Request for report to be faxed [facility] for MR Brain 6/8/2018-MR @ 958am    11/24/2020-2nd request for Image report faxed to [facility]-MR @ 534am    12/7/2020-[facility] Images now in PACS-MR @ 630am

## 2020-12-03 DIAGNOSIS — R11.0 NAUSEA: ICD-10-CM

## 2020-12-03 DIAGNOSIS — R07.89 CHEST WALL PAIN: ICD-10-CM

## 2020-12-03 LAB
BASOPHILS # BLD AUTO: 0 10E9/L (ref 0–0.2)
BASOPHILS NFR BLD AUTO: 0.4 %
CRP SERPL-MCNC: <2.9 MG/L (ref 0–8)
DIFFERENTIAL METHOD BLD: NORMAL
EOSINOPHIL # BLD AUTO: 0.2 10E9/L (ref 0–0.7)
EOSINOPHIL NFR BLD AUTO: 2.1 %
ERYTHROCYTE [DISTWIDTH] IN BLOOD BY AUTOMATED COUNT: 13.4 % (ref 10–15)
ERYTHROCYTE [SEDIMENTATION RATE] IN BLOOD BY WESTERGREN METHOD: 13 MM/H (ref 0–20)
HCT VFR BLD AUTO: 42.2 % (ref 35–47)
HGB BLD-MCNC: 13.8 G/DL (ref 11.7–15.7)
LYMPHOCYTES # BLD AUTO: 1.5 10E9/L (ref 0.8–5.3)
LYMPHOCYTES NFR BLD AUTO: 18.8 %
MCH RBC QN AUTO: 32.5 PG (ref 26.5–33)
MCHC RBC AUTO-ENTMCNC: 32.7 G/DL (ref 31.5–36.5)
MCV RBC AUTO: 100 FL (ref 78–100)
MONOCYTES # BLD AUTO: 0.5 10E9/L (ref 0–1.3)
MONOCYTES NFR BLD AUTO: 6 %
NEUTROPHILS # BLD AUTO: 5.8 10E9/L (ref 1.6–8.3)
NEUTROPHILS NFR BLD AUTO: 72.7 %
PLATELET # BLD AUTO: 248 10E9/L (ref 150–450)
RBC # BLD AUTO: 4.24 10E12/L (ref 3.8–5.2)
WBC # BLD AUTO: 8 10E9/L (ref 4–11)

## 2020-12-03 PROCEDURE — 36415 COLL VENOUS BLD VENIPUNCTURE: CPT | Performed by: NURSE PRACTITIONER

## 2020-12-03 PROCEDURE — 85652 RBC SED RATE AUTOMATED: CPT | Performed by: NURSE PRACTITIONER

## 2020-12-03 PROCEDURE — 85025 COMPLETE CBC W/AUTO DIFF WBC: CPT | Performed by: NURSE PRACTITIONER

## 2020-12-03 PROCEDURE — 86140 C-REACTIVE PROTEIN: CPT | Performed by: NURSE PRACTITIONER

## 2021-01-08 ENCOUNTER — NURSE TRIAGE (OUTPATIENT)
Dept: NURSING | Facility: CLINIC | Age: 51
End: 2021-01-08

## 2021-01-08 DIAGNOSIS — R07.89 CHEST WALL PAIN: ICD-10-CM

## 2021-01-08 NOTE — TELEPHONE ENCOUNTER
"Patient calling - says she is having severe pain in her right leg.   Says pain is on and off since 2016.  Pain has been constant today.  Pain is from knee down and \"burns.\"  Rates pain 8/10.  Has taken naproxen.     No difficulty breathing.  No chest pain.  No fever.    Triaged to disposition of See Provider Within 4 Hours.  Patient intends to go to Urgent Care.    Mana Ojeda RN  Triage Nurse Advisor    COVID 19 Nurse Triage Plan/Patient Instructions    Please be aware that novel coronavirus (COVID-19) may be circulating in the community. If you develop symptoms such as fever, cough, or SOB or if you have concerns about the presence of another infection including coronavirus (COVID-19), please contact your health care provider or visit [email or web address].     Disposition/Instructions    In-Person Visit with provider recommended. Reference Visit Selection Guide.    Thank you for taking steps to prevent the spread of this virus.  o Limit your contact with others.  o Wear a simple mask to cover your cough.  o Wash your hands well and often.    Resources    [facility]: About COVID-19: [email or web address]    CDC: What to Do If You're Sick: www.cdc.gov/coronavirus/2019-ncov/about/steps-when-sick.html    CDC: Ending Home Isolation: www.cdc.gov/coronavirus/2019-ncov/hcp/disposition-in-home-patients.html     CDC: Caring for Someone: www.cdc.gov/coronavirus/2019-ncov/if-you-are-sick/care-for-someone.html     [facility]: Interim Guidance for Hospital Discharge to Home: www.health.[facility].[place].us/diseases/coronavirus/hcp/hospdischarge.pdf    [facility] clinical trials (COVID-19 research studies): [email or web address]-clinical-trials     Below are the COVID-19 hotlines at the [place] Department of Health ([facility]). Interpreters are available.   o For health questions: Call 324-019-0305 or 1-186.287.1784 (7 a.m. to 7 p.m.)  o For questions about schools and childcare: Call 890-085-8960 or 1-805.863.2036 " (7 a.m. to 7 p.m.)       Additional Information    Negative: Looks like a broken bone or dislocated joint (e.g., crooked or deformed)    Negative: Sounds like a life-threatening emergency to the triager    Negative: Followed a leg injury    Negative: Leg swelling is main symptom    Negative: Back pain radiating (shooting) into leg(s)    Negative: Knee pain is main symptom    Negative: Ankle pain is main symptom    Negative: Pregnant    Negative: Postpartum (from 0 to 6 weeks after delivery)    Negative: Chest pain    Negative: Difficulty breathing    Negative: Entire foot is cool or blue in comparison to other side    Negative: Unable to walk    Negative: [1] Red area or streak AND [2] fever    Negative: [1] Swollen joint AND [2] fever    Negative: [1] Cast on leg or ankle AND [2] now increased pain    Negative: Patient sounds very sick or weak to the triager    [1] SEVERE pain (e.g., excruciating, unable to do any normal activities) AND [2] not improved after 2 hours of pain medicine    Protocols used: LEG PAIN-A-AH

## 2021-01-08 NOTE — LETTER
Dear Eunice,    Your provider has sent a  shelby refill of Naproxen. You are due for an appointment for further refills.  Please contact the clinic to schedule an appointment for further refills.     Team Roberto Navarro CMA

## 2021-01-12 NOTE — TELEPHONE ENCOUNTER
Routing refill request to provider for review/approval because:  BP Readings from Last 3 Encounters:   11/18/20 (!) 143/94   10/13/20 124/80   10/12/20 120/86

## 2021-01-13 ENCOUNTER — OFFICE VISIT (OUTPATIENT)
Dept: NEUROLOGY | Facility: CLINIC | Age: 51
End: 2021-01-13
Attending: PSYCHIATRY & NEUROLOGY
Payer: COMMERCIAL

## 2021-01-13 DIAGNOSIS — M54.41 CHRONIC RIGHT-SIDED LOW BACK PAIN WITH RIGHT-SIDED SCIATICA: Primary | ICD-10-CM

## 2021-01-13 DIAGNOSIS — G89.29 CHRONIC RIGHT-SIDED LOW BACK PAIN WITH RIGHT-SIDED SCIATICA: Primary | ICD-10-CM

## 2021-01-13 PROCEDURE — 95910 NRV CNDJ TEST 7-8 STUDIES: CPT | Performed by: PSYCHIATRY & NEUROLOGY

## 2021-01-13 PROCEDURE — 95886 MUSC TEST DONE W/N TEST COMP: CPT | Performed by: PSYCHIATRY & NEUROLOGY

## 2021-01-13 RX ORDER — NAPROXEN 500 MG/1
TABLET ORAL
Qty: 30 TABLET | Refills: 0 | Status: SHIPPED | OUTPATIENT
Start: 2021-01-13 | End: 2021-04-12

## 2021-01-13 NOTE — PROGRESS NOTES
[facility]  Electrodiagnostic Laboratory    Nerve Conduction & EMG Report          Patient:       Eunice Barrientos  Patient ID:    [id number]  Gender:        Female  YOB: 1970  Age:           51 Years 0 Months        History & Examination:  51 year old woman with low back pain radiating into the right leg. History of L5-S1 fusion. Evaluate for radiculopathy.     Techniques: Motor and sensory conduction studies were done with surface recording electrodes. EMG was done with a concentric needle electrode.      Results:  Nerve conduction studies:   1. Bilateral sural and right superficial peroneal sensory responses are normal.   2. Bilateral peroneal-EDB and tibial-AH motor responses are normal.     Needle EMG of selected proximal and distal right lower limb muscles was performed as tabulated below. No abnormal spontaneous activity was observed in the sampled muscles. Motor unit potential morphology and recruitment patterns were normal.     Interpretation:  This is a normal study. There is no electrophysiologic evidence of a focal neuropathy or lumbosacral radiculopathy affecting the right lower limb on the basis of this study.     Matt Monique MD  Department of Neurology           /Sensory NCS      Nerve / Sites Rec. Site Onset Peak NP Amp Ref. PP Amp Dist Juan Ref. Temp     ms ms  V  V  V cm m/s m/s  C   R SURAL - Lat Mall      Calf Ankle 2.50 3.28 10.6 8.0 11.8 14 56.0 38.0 31.5   L SURAL - Lat Mall      Calf Ankle 2.29 3.07 9.5 8.0 12.0 14 61.1 38.0 31.4   R SUP PERONEAL      Lat Leg Hester 2.08 2.86 15.3  18.6 10 48.0 38.0 31.7       Motor NCS      Nerve / Sites Rec. Site Lat Ref. Amp Ref. Rel Amp Dist Juan Ref. Dur. Area Temp.     ms ms mV mV % cm m/s m/s ms %  C   R DEEP PERONEAL - EDB      Ankle EDB 3.85 6.00 5.1 2.5 100 8   5.26 100 31.4      FibHead EDB 9.38  5.2  102 29 52.5 38.0 5.57 105 31.4      Pop Fos EDB 11.20  5.1  101 10 54.9 38.0 5.52 187 31.4   L DEEP PERONEAL - EDB       Ankle EDB 4.53 6.00 3.7 2.5 100 8   4.58 100 23.7   R TIBIAL - AH      Ankle AH 4.01 6.00 15.2 4.0 100 8   5.00 100 31.6      Pop Fos AH 10.68  13.0  85.6 36 54.0 38.0 5.73 97.4 31.6   L TIBIAL - AH      Ankle AH 4.17 6.00 12.2 4.0 100 8   5.00 100 31.6       EMG Summary Table     Spontaneous Recruitment MUAP    IA Fib PSW Fasc H.F. Pattern Amp Dur. PPP   R. VAST LATERALIS N None None None None N N N N   R. TIB ANTERIOR N None None None None N N N N   R. PERON LONGUS N None None None None N N N N   R. TIB POSTERIOR N None None None None N N N N   R. GASTROCN (MED) N None None None None N N N N

## 2021-01-13 NOTE — LETTER
1/13/2021       RE: Eunice Barrientos  [address] [place] [address]     Dear Colleague,    Thank you for referring your patient, Eunice Barrientos, to the [facility] EMG [facility]IN[facility] [place] at [facility]. Please see a copy of my visit note below.        [facility]  Electrodiagnostic Laboratory    Nerve Conduction & EMG Report          Patient:       Eunice Barrientos  Patient ID:    [id number]  Gender:        Female  YOB: 1970  Age:           51 Years 0 Months        History & Examination:  51 year old woman with low back pain radiating into the right leg. History of L5-S1 fusion. Evaluate for radiculopathy.     Techniques: Motor and sensory conduction studies were done with surface recording electrodes. EMG was done with a concentric needle electrode.      Results:  Nerve conduction studies:   1. Bilateral sural and right superficial peroneal sensory responses are normal.   2. Bilateral peroneal-EDB and tibial-AH motor responses are normal.     Needle EMG of selected proximal and distal right lower limb muscles was performed as tabulated below. No abnormal spontaneous activity was observed in the sampled muscles. Motor unit potential morphology and recruitment patterns were normal.     Interpretation:  This is a normal study. There is no electrophysiologic evidence of a focal neuropathy or lumbosacral radiculopathy affecting the right lower limb on the basis of this study.     Matt Monique MD  Department of Neurology           /Sensory NCS      Nerve / Sites Rec. Site Onset Peak NP Amp Ref. PP Amp Dist Juan Ref. Temp     ms ms  V  V  V cm m/s m/s  C   R SURAL - Lat Mall      Calf Ankle 2.50 3.28 10.6 8.0 11.8 14 56.0 38.0 31.5   L SURAL - Lat Mall      Calf Ankle 2.29 3.07 9.5 8.0 12.0 14 61.1 38.0 31.4   R SUP PERONEAL      Lat Leg Hester 2.08 2.86 15.3  18.6 10 48.0 38.0 31.7       Motor NCS      Nerve / Sites Rec. Site Lat Ref. Amp Ref. Rel Amp  Dist Juan Ref. Dur. Area Temp.     ms ms mV mV % cm m/s m/s ms %  C   R DEEP PERONEAL - EDB      Ankle EDB 3.85 6.00 5.1 2.5 100 8   5.26 100 31.4      FibHead EDB 9.38  5.2  102 29 52.5 38.0 5.57 105 31.4      Pop Fos EDB 11.20  5.1  101 10 54.9 38.0 5.52 187 31.4   L DEEP PERONEAL - EDB      Ankle EDB 4.53 6.00 3.7 2.5 100 8   4.58 100 23.7   R TIBIAL - AH      Ankle AH 4.01 6.00 15.2 4.0 100 8   5.00 100 31.6      Pop Fos AH 10.68  13.0  85.6 36 54.0 38.0 5.73 97.4 31.6   L TIBIAL - AH      Ankle AH 4.17 6.00 12.2 4.0 100 8   5.00 100 31.6       EMG Summary Table     Spontaneous Recruitment MUAP    IA Fib PSW Fasc H.F. Pattern Amp Dur. PPP   R. VAST LATERALIS N None None None None N N N N   R. TIB ANTERIOR N None None None None N N N N   R. PERON LONGUS N None None None None N N N N   R. TIB POSTERIOR N None None None None N N N N   R. GASTROCN (MED) N None None None None N N N N                            Again, thank you for allowing me to participate in the care of your patient.      Sincerely,    Matt Monique MD

## 2021-01-15 ENCOUNTER — OFFICE VISIT (OUTPATIENT)
Dept: FAMILY MEDICINE | Facility: CLINIC | Age: 51
End: 2021-01-15
Payer: COMMERCIAL

## 2021-01-15 VITALS
OXYGEN SATURATION: 97 % | DIASTOLIC BLOOD PRESSURE: 80 MMHG | BODY MASS INDEX: 45.54 KG/M2 | HEART RATE: 91 BPM | RESPIRATION RATE: 14 BRPM | HEIGHT: 63 IN | TEMPERATURE: 98.4 F | WEIGHT: 257 LBS | SYSTOLIC BLOOD PRESSURE: 120 MMHG

## 2021-01-15 DIAGNOSIS — R30.0 DYSURIA: Primary | ICD-10-CM

## 2021-01-15 DIAGNOSIS — R07.9 CHEST PAIN, UNSPECIFIED TYPE: ICD-10-CM

## 2021-01-15 DIAGNOSIS — N89.8 VAGINAL DISCHARGE: ICD-10-CM

## 2021-01-15 DIAGNOSIS — Z00.00 HEALTH CARE MAINTENANCE: ICD-10-CM

## 2021-01-15 LAB
ALBUMIN UR-MCNC: NEGATIVE MG/DL
APPEARANCE UR: CLEAR
BACTERIA #/AREA URNS HPF: ABNORMAL /HPF
BILIRUB UR QL STRIP: NEGATIVE
COLOR UR AUTO: YELLOW
GLUCOSE UR STRIP-MCNC: NEGATIVE MG/DL
HGB UR QL STRIP: ABNORMAL
KETONES UR STRIP-MCNC: NEGATIVE MG/DL
LEUKOCYTE ESTERASE UR QL STRIP: NEGATIVE
NITRATE UR QL: NEGATIVE
NON-SQ EPI CELLS #/AREA URNS LPF: ABNORMAL /LPF
PH UR STRIP: 6 PH (ref 5–7)
RBC #/AREA URNS AUTO: ABNORMAL /HPF
SOURCE: ABNORMAL
SP GR UR STRIP: 1.02 (ref 1–1.03)
SPECIMEN SOURCE: NORMAL
UROBILINOGEN UR STRIP-ACNC: 0.2 EU/DL (ref 0.2–1)
WBC #/AREA URNS AUTO: ABNORMAL /HPF
WET PREP SPEC: NORMAL

## 2021-01-15 PROCEDURE — 99213 OFFICE O/P EST LOW 20 MIN: CPT | Performed by: FAMILY MEDICINE

## 2021-01-15 PROCEDURE — 81001 URINALYSIS AUTO W/SCOPE: CPT | Performed by: FAMILY MEDICINE

## 2021-01-15 PROCEDURE — 87210 SMEAR WET MOUNT SALINE/INK: CPT | Performed by: FAMILY MEDICINE

## 2021-01-15 ASSESSMENT — MIFFLIN-ST. JEOR: SCORE: 1759.87

## 2021-01-15 NOTE — PATIENT INSTRUCTIONS
[facility]    If you have any questions regarding to your visit please contact your care team:       Team Red:   Clinic Hours Telephone Number   [address], [address] 7am-6pm  Monday - Thursday   7am-5pm  Fridays  [phone]  (Appointment scheduling available 24/7)    Questions about your recent visit?   Team Line  [phone]   Urgent Care - [place] and [facility] - 11am-8pm Monday-Friday Saturday-Sunday- 9am-5pm   [place] - 5pm-8pm Monday-Friday Saturday-Sunday- 9am-5pm  [phone] - [place]  [phone] - [place]       What options do I have for a visit other than an office visit? We offer electronic visits (e-visits) and telephone visits, when medically appropriate.  Please check with your medical insurance to see if these types of visits are covered, as you will be responsible for any charges that are not paid by your insurance.      You can use [employer] (secure electronic communication) to access to your chart, send your primary care provider a message, or make an appointment. Ask a team member how to get started.     For a price quote for your services, please call our Consumer Price Line at [phone] or our Imaging Cost estimation line at [phone] (for imaging tests).

## 2021-01-15 NOTE — PROGRESS NOTES
Assessment & Plan      Eunice is a 50 yo F with a ho hypothyroidism, hydrocephalus with intracranial shunt, hx of DVT/PE (not on long term therapy)  and chronic low back pain with Rt sided sciatica (normal EMG 1/2021) here with dysuria;    Dysuria  UA unremarkable. Possible irritation from moisture or atrophic vaginitis. Discussed light clothing, weight loss  - *UA reflex to Microscopic and Culture ([place] [facility] [place] [facility] (except [place] and [place])    Vaginal discharge   Wet prep normal  - Wet prep    Rt side chest pain   -  Pain in the Rt pectoral muscle. Discussed possible muscular strain; upper extremity exercises.    BMI 40.0-44.9, adult (H)    -    Counseled to make better food choices, exercise as tolerated, and lose weight.     Health Care Maintenance:  Care gaps: phys, Hep C, eye, PAP/HPV, flu. LDL, mammo (last 7/2019)    Return in about 3 months (around 4/15/2021) for Physical Exam.    Lorenzo Hercules MD  [facility] [place]    Subjective     Eunice is a 51 year old who presents to clinic today for the following health issues:    HPI       Genitourinary - Female     Has had odor for a couple of week.     Seen in UC and started on Abx, and apparently went away. Has a lot of moisture. A little dysuria.    LMP; 3 weeks a little     Has gained a lot weight    Onset/Duration: for while  Description:   Painful urination (Dysuria): no           Frequency: YES  Blood in urine (Hematuria): no  Delay in urine (Hesitency): no  Intensity: moderate  Progression of Symptoms:  worsening  Accompanying Signs & Symptoms:  Fever/chills: no  Flank pain: no  Nausea and vomiting: no  Vaginal symptoms: discharge and odor  Abdominal/Pelvic Pain: no  History:   History of frequent UTI s: no  History of kidney stones: no  Sexually Active: YES  Possibility of pregnancy: No  Precipitating or alleviating factors: None  Therapies tried and outcome:  none    Rt side chest pain x 4 mo     Limiting her upper  "extremity mobility.    Review of Systems   Constitutional, HEENT, cardiovascular, pulmonary, gi and gu systems are negative, except as otherwise noted.      Objective    /80   Pulse 91   Temp 98.4  F (36.9  C)   Resp 14   Ht 1.6 m (5' 3\")   Wt 116.6 kg (257 lb)   SpO2 97%   BMI 45.53 kg/m    Body mass index is 45.53 kg/m .  Physical Exam   GENERAL: healthy, alert and no distress  RESP: lungs clear to auscultation - no rales, rhonchi or wheezes  CV: regular rate and rhythm, no murmur, click or rub  MS: tenderness with palpation of pectoral muscle. No axillary adenopathy    Results for orders placed or performed in visit on 01/15/21   *UA reflex to Microscopic and Culture ([place] and [facility] (except [place] and [place])     Status: Abnormal    Specimen: Midstream Urine   Result Value Ref Range    Color Urine Yellow     Appearance Urine Clear     Glucose Urine Negative NEG^Negative mg/dL    Bilirubin Urine Negative NEG^Negative    Ketones Urine Negative NEG^Negative mg/dL    Specific Gravity Urine 1.025 1.003 - 1.035    Blood Urine Trace (A) NEG^Negative    pH Urine 6.0 5.0 - 7.0 pH    Protein Albumin Urine Negative NEG^Negative mg/dL    Urobilinogen Urine 0.2 0.2 - 1.0 EU/dL    Nitrite Urine Negative NEG^Negative    Leukocyte Esterase Urine Negative NEG^Negative    Source Midstream Urine    Urine Microscopic     Status: Abnormal   Result Value Ref Range    WBC Urine 0 - 5 OTO5^0 - 5 /HPF    RBC Urine O - 2 OTO2^O - 2 /HPF    Squamous Epithelial /LPF Urine Moderate (A) FEW^Few /LPF    Bacteria Urine Few (A) NEG^Negative /HPF   Wet prep     Status: None    Specimen: Vagina   Result Value Ref Range    Specimen Description Vagina     Wet Prep No Trichomonas seen     Wet Prep No yeast seen     Wet Prep No clue cells seen     Wet Prep WBC'S seen     Wet Prep Few        "

## 2021-01-18 ENCOUNTER — TELEPHONE (OUTPATIENT)
Dept: NEUROSURGERY | Facility: CLINIC | Age: 51
End: 2021-01-18

## 2021-01-18 NOTE — TELEPHONE ENCOUNTER
[facility] Call Center    Phone Message    May a detailed message be left on voicemail: yes     Reason for Call: Other: Patient calling to schedule an appointment with Neurosurgery - states that she is having some issues with her leg and states she needs her shunt to be checked. Writer advised patient she has an appointment scheduled with Dr. Jarvis for tomorrow 1/19/21 at 1:30 for a video visit - patient states that she would like to keep that appointment but states she feels as if she needs to come into the clinic and be examined in person.      Please advise and call patient back at your earliest convenience to discuss further     Action Taken: Other: [facility]     Travel Screening: Not Applicable                                                                       Patient here for EKG after restarting Propafenone 325mg BID on Saturday 5/9 when patient's apple watch informed her that she was in atrial fib.  Patient feels tired, anxious and has palpitations.    EKG:  Vent rate 87 bpm  MS interval   *  QRS duration  94 ms  QT/QTc  394/474 ms  P - R - T axes  *  14  17    Attempted to reach Dr. Dickey.  Patient in stable condition and sent home with instructions that the office will contact her.

## 2021-02-03 ENCOUNTER — DOCUMENTATION ONLY (OUTPATIENT)
Dept: CARE COORDINATION | Facility: CLINIC | Age: 51
End: 2021-02-03

## 2021-02-23 ENCOUNTER — OFFICE VISIT (OUTPATIENT)
Dept: NEUROSURGERY | Facility: CLINIC | Age: 51
End: 2021-02-23
Payer: COMMERCIAL

## 2021-02-23 VITALS
HEIGHT: 63 IN | SYSTOLIC BLOOD PRESSURE: 145 MMHG | DIASTOLIC BLOOD PRESSURE: 95 MMHG | OXYGEN SATURATION: 97 % | RESPIRATION RATE: 16 BRPM | WEIGHT: 263 LBS | HEART RATE: 76 BPM | BODY MASS INDEX: 46.6 KG/M2

## 2021-02-23 DIAGNOSIS — E66.01 MORBID OBESITY (H): ICD-10-CM

## 2021-02-23 DIAGNOSIS — G93.2 IDIOPATHIC INTRACRANIAL HYPERTENSION: Primary | ICD-10-CM

## 2021-02-23 DIAGNOSIS — M54.17 LUMBOSACRAL RADICULOPATHY: ICD-10-CM

## 2021-02-23 PROCEDURE — 99203 OFFICE O/P NEW LOW 30 MIN: CPT | Performed by: NEUROLOGICAL SURGERY

## 2021-02-23 ASSESSMENT — PAIN SCALES - GENERAL: PAINLEVEL: EXTREME PAIN (8)

## 2021-02-23 ASSESSMENT — MIFFLIN-ST. JEOR: SCORE: 1787.09

## 2021-02-23 NOTE — PROGRESS NOTES
Neurosurgery Clinic Note    Reason for Visit: consult for shunt    History of Present Illness  Eunice Barrientos is a 51-year-old woman with a history of idiopathic intracranial hypertension status post  shunt who presents to establish care.  Her shunt was placed in 2003 which was followed by revision in 2005 due to infection.  She has been doing well without any headaches or visual changes since that time but just wanted to establish care in case she had any difficulty.    She has been going through some significant times right now with her [place] and has been struggling.  He is doing okay now and is finally starting to recover but has had a complex medical course.    More significant to her is her chronic back and leg pain.  She originally suffered a vertebral fracture in 2009 and underwent an anterior posterior L5-S1 fusion.  She did well for 10 years until a couple years ago when she started having pain shooting down her right leg and right buttock.  The pain is intermittent but is worse when she is standing upright.  She has con commitment paresthesias and burning pain in her right leg.  She obtained an EMG by Dr. Monique, which was negative.        Past Medical History:   Diagnosis Date     History of blood transfusion      History of DVT (deep vein thrombosis)     related to port     Hydrocephalus (H)      Hypertension goal BP (blood pressure) < 140/90      Macular degeneration (senile) of retina 7/6/2017     Thyroid disease        Past Surgical History:   Procedure Laterality Date     CHOLECYSTECTOMY       COLPOSCOPY CERVIX, BIOPSY CERVIX, ENDOCERVICAL CURETTAGE, COMBINED  2009    several, earliest was 1992     FUSION LUMBAR ANTERIOR ONE LEVEL  2013     intracranial shunt       LEEP TX, CERVICAL  1995     MASTOIDECTOMY  2003     OOPHORECTOMY Left        Family History   Problem Relation Age of Onset     Diabetes Maternal Grandmother      Hypertension Maternal Grandmother      Cancer  "Maternal Aunt      Cerebrovascular Disease No family hx of      Thyroid Disease No family hx of      Glaucoma No family hx of      Macular Degeneration No family hx of        Social History     Socioeconomic History     Marital status:      Spouse name: Not on file     Number of children: Not on file     Years of education: Not on file     Highest education level: Not on file   Occupational History     Not on file   Social Needs     Financial resource strain: Not on file     Food insecurity     Worry: Not on file     Inability: Not on file     Transportation needs     Medical: Not on file     Non-medical: Not on file   Tobacco Use     Smoking status: Never Smoker     Smokeless tobacco: Never Used   Substance and Sexual Activity     Alcohol use: No     Drug use: No     Sexual activity: Not Currently     Partners: Male          Allergies   Allergen Reactions     Terazol [Terconazole] Hives       Current Outpatient Medications   Medication     hydrochlorothiazide (HYDRODIURIL) 12.5 MG tablet     levothyroxine (SYNTHROID/LEVOTHROID) 125 MCG tablet     levothyroxine (SYNTHROID/LEVOTHROID) 137 MCG tablet     naproxen (NAPROSYN) 500 MG tablet     Prenatal Vit-Fe Fumarate-FA (PRENATAL MULTIVITAMIN  PLUS IRON) 27-0.8 MG TABS per tablet     No current facility-administered medications for this visit.       ROS: 10 point ROS neg other than the symptoms noted above in the HPI.      Physical Exam  BP (!) 145/95   Pulse 76   Resp 16   Ht 1.6 m (5' 3\")   Wt 119.3 kg (263 lb)   SpO2 97%   BMI 46.59 kg/m        General: Awake, alert, oriented. Well nourished, well developed, no acute distress.  HEENT: Head normocephalic, atraumatic.   Heart: Regular rhythm and rate. No murmurs.  Lungs: Clear to auscultation and percussion bilaterally  Abdomen: Soft, non-tender, non-distended. No hepatosplenomegaly.  Extremity: Warm with no clubbing or cyanosis. No lower extremity edema.    Neurological  Awake, alert and oriented to " date, time, place and person. Speech fluent.   Pupils equal, round, reactive to light.  Extraocular movement intact.  Face symmetric.  Tongue midline.  Uvula elevates equally.    Motor: full strength throughout.  Sensation: intact to light touch except some numbness over the lateral and posterior aspect of the Right anatomic leg and over the foot.       Imaging: my interpretations only  CT 07/01/2016: nearly slit ventricles with Left sided  shunt. Left ventricle is slightly more collapsed.   Shunt XR 7/13/2004: unknown fixed pressure valve, there is clearly no adjustable mechanism but the  is uncommon, possibly Aseculup fixed      Assessment and Plan   Eunice is a 51-year-old woman with a longstanding history of  shunt for I IH currently suffering from right-sided radiculopathy that is negative on EMG.  We did discuss obtaining some baseline imaging for her shunt and an MRI of her lumbar spine to see if any additional information could be gleaned about her rather classic symptoms.  If the MRI is not useful then we can explore a myelogram. If there are any surgical lesions I may refer her to one of my colleagues such as Dr. Healy for consideration.  Otherwise is I can have her follow-up in a year routinely for her shunt.    1. lumbar MRI with valium  2. XR shunt series and CT Head sometime this year      Didier Jarvis MD  Department of Neurosurgery  [facility]

## 2021-02-23 NOTE — LETTER
2/23/2021       RE: Eunice Barrientos  [address] [place] [address]     Dear Colleague,    Thank you for referring your patient, Eunice Barrientos, to the [facility]UROSURGERY [facility]IN[facility] [place] [facility]. Please see a copy of my visit note below.    Neurosurgery Clinic Note    Reason for Visit: consult for shunt    History of Present Illness  Eunice Barrientos is a 51-year-old woman with a history of idiopathic intracranial hypertension status post  shunt who presents to establish care.  Her shunt was placed in 2003 which was followed by revision in 2005 due to infection.  She has been doing well without any headaches or visual changes since that time but just wanted to establish care in case she had any difficulty.    She has been going through some significant times right now with her [place] being [place] and has been struggling.  He is doing okay now and is finally starting to recover but has had a complex medical course.    More significant to her is her chronic back and leg pain.  She originally suffered a vertebral fracture in 2009 and underwent an anterior posterior L5-S1 fusion.  She did well for 10 years until a couple years ago when she started having pain shooting down her right leg and right buttock.  The pain is intermittent but is worse when she is standing upright.  She has con commitment paresthesias and burning pain in her right leg.  She obtained an EMG by Dr. Monique, which was negative.      Past Medical History:   Diagnosis Date     History of blood transfusion      History of DVT (deep vein thrombosis)     related to port     Hydrocephalus (H)      Hypertension goal BP (blood pressure) < 140/90      Macular degeneration (senile) of retina 7/6/2017     Thyroid disease        Past Surgical History:   Procedure Laterality Date     CHOLECYSTECTOMY       COLPOSCOPY CERVIX, BIOPSY CERVIX, ENDOCERVICAL CURETTAGE, COMBINED  2009     "several, earliest was 1992     FUSION LUMBAR ANTERIOR ONE LEVEL  2013     intracranial shunt       LEEP TX, CERVICAL  1995     MASTOIDECTOMY  2003     OOPHORECTOMY Left        Family History   Problem Relation Age of Onset     Diabetes Maternal Grandmother      Hypertension Maternal Grandmother      Cancer Maternal Aunt      Cerebrovascular Disease No family hx of      Thyroid Disease No family hx of      Glaucoma No family hx of      Macular Degeneration No family hx of        Social History     Socioeconomic History     Marital status:      Spouse name: Not on file     Number of children: Not on file     Years of education: Not on file     Highest education level: Not on file   Occupational History     Not on file   Social Needs     Financial resource strain: Not on file     Food insecurity     Worry: Not on file     Inability: Not on file     Transportation needs     Medical: Not on file     Non-medical: Not on file   Tobacco Use     Smoking status: Never Smoker     Smokeless tobacco: Never Used   Substance and Sexual Activity     Alcohol use: No     Drug use: No     Sexual activity: Not Currently     Partners: Male          Allergies   Allergen Reactions     Terazol [Terconazole] Hives       Current Outpatient Medications   Medication     hydrochlorothiazide (HYDRODIURIL) 12.5 MG tablet     levothyroxine (SYNTHROID/LEVOTHROID) 125 MCG tablet     levothyroxine (SYNTHROID/LEVOTHROID) 137 MCG tablet     naproxen (NAPROSYN) 500 MG tablet     Prenatal Vit-Fe Fumarate-FA (PRENATAL MULTIVITAMIN  PLUS IRON) 27-0.8 MG TABS per tablet     No current facility-administered medications for this visit.       ROS: 10 point ROS neg other than the symptoms noted above in the HPI.      Physical Exam  BP (!) 145/95   Pulse 76   Resp 16   Ht 1.6 m (5' 3\")   Wt 119.3 kg (263 lb)   SpO2 97%   BMI 46.59 kg/m      General: Awake, alert, oriented. Well nourished, well developed, no acute distress.  HEENT: Head " normocephalic, atraumatic.   Heart: Regular rhythm and rate. No murmurs.  Lungs: Clear to auscultation and percussion bilaterally  Abdomen: Soft, non-tender, non-distended. No hepatosplenomegaly.  Extremity: Warm with no clubbing or cyanosis. No lower extremity edema.    Neurological  Awake, alert and oriented to date, time, place and person. Speech fluent.   Pupils equal, round, reactive to light.  Extraocular movement intact.  Face symmetric.  Tongue midline.  Uvula elevates equally.    Motor: full strength throughout.  Sensation: intact to light touch except some numbness over the lateral and posterior aspect of the Right anatomic leg and over the foot.     Imaging: my interpretations only  CT 07/01/2016: nearly slit ventricles with Left sided  shunt. Left ventricle is slightly more collapsed.   Shunt XR 7/13/2004: unknown fixed pressure valve, there is clearly no adjustable mechanism but the  is uncommon, possibly Aseculup fixed    Assessment and Plan   Eunice is a 51-year-old woman with a longstanding history of  shunt for I IH currently suffering from right-sided radiculopathy that is negative on EMG.  We did discuss obtaining some baseline imaging for her shunt and an MRI of her lumbar spine to see if any additional information could be gleaned about her rather classic symptoms.  If the MRI is not useful then we can explore a myelogram. If there are any surgical lesions I may refer her to one of my colleagues such as Dr. Healy for consideration.  Otherwise is I can have her follow-up in a year routinely for her shunt.    1. lumbar MRI with valium  2. XR shunt series and CT Head sometime this year    Didier Jarvis MD  Department of Neurosurgery  [facility]

## 2021-03-04 NOTE — PROGRESS NOTES
Assessment & Plan     Hypertension goal BP (blood pressure) < 140/90  Blood pressure is above goal, but she has been out of her hydrochlorothiazide for two weeks.  Recommend resuming the medication at the current dose (12.5 mg once daily) and recheck blood pressure in 4 weeks.  - hydrochlorothiazide (HYDRODIURIL) 12.5 MG tablet; Take 1 tablet (12.5 mg) by mouth daily    Vaginal discharge  Will obtain a wet prep to r/o yeast infection and/or bacterial vaginosis. She declines the need for STD testing.  - Wet prep    Diarrhea, unspecified type  Does not appear dehydrated in clinic today.  Description of the dark color of her diarrhea is concerning for melena. Also need to rule out c.diff infection due to the persistence of diarrhea with severe abdominal cramping. Counseled on self-care measures including; eat a bland diet, avoid fatty foods, stay well hydrated with water, eat smaller meals; warning signs of when to seek urgent medical care including: blood in stool or urine, lightheadedness, worsening symptoms, fever, pain that does not resolve after 5 days.    - Basic metabolic panel  - Clostridium difficile Toxin B PCR; Future  - Occult blood stool; Future  - CBC with platelets differential    Dysuria  Will obtain a UA today to r/o UTI.   - UA with Microscopic reflex to Culture             FUTURE APPOINTMENTS:       - Follow-up visit in 4 weeks to monitor hypertension.    Return in about 4 weeks (around 4/2/2021) for Follow up clinic visit hypertension.    Nena Jones, VIPUL/FNP Student, [facility]    I very much appreciated the opportunity to see and assess this patient in the clinic with NP student.  I agree with the above note which summarizes my findings and current recommendations. I have reviewed all diagnostics noted and performed physical exam. Changes were made in the body of the note to achieve one comprehensive document.    Ya Ortiz, ABIOLA [facility]  "TOMA Dawson is a 51 year old who presents for the following health issues     HPI       Hypertension Follow-up      Do you check your blood pressure regularly outside of the clinic? No     Are you following a low salt diet? Yes    Are your blood pressures ever more than 140 on the top number (systolic) OR more   than 90 on the bottom number (diastolic), for example 140/90? Yes      How many servings of fruits and vegetables do you eat daily?  2-3    On average, how many sweetened beverages do you drink each day (Examples: soda, juice, sweet tea, etc.  Do NOT count diet or artificially sweetened beverages)?   0    How many days per week do you exercise enough to make your heart beat faster? 3 or less    How many minutes a day do you exercise enough to make your heart beat faster? 30 - 60    How many days per week do you miss taking your medication? 0 - she doesn't miss doses, but she has been out of hydrochlorothiazide for the past two weeks    She has been working on losing weight and is down 5 lb this week. She is walking on the treadmill at the gym three times a week walking 30 minutes each time. She continues to see floaters intermittently including over the past week.  She ran out of hydrochlorothiazide about two weeks ago.  She has intermittent headaches which are unchanged; she is scheduled for an MRI with sedation to check her  shunt.    She has been having liquid diarrhea for the past week, 2- 5 episodes per day. These are accompanied by abdominal cramps. She describes the stool as \"dark, dark brown.\" No recent changes to her diet. Denies nausea or vomiting.    Denies dysuria, but does have a feeling of incomplete bladder emptying.      Vaginal Symptoms  Onset/Duration: 1 week  Description:  Vaginal Discharge: sometimes brownish  Itching (Pruritis): YES  Burning sensation:  no  Odor: YES- \"fishy\"  Accompanying Signs & Symptoms:  Urinary symptoms: no  Abdominal pain: YES  Fever: " no  History:   Sexually active: no  New Partner: no  Possibility of Pregnancy:  no  Recent antibiotic use: no  Previous vaginitis issues: YES  Precipitating or alleviating factors: None  Therapies tried and outcome: none      Review of Systems   Constitutional, HEENT, cardiovascular, pulmonary, gi and gu systems are negative, except as otherwise noted.      Objective    BP (!) 152/98   Pulse 94   Temp 97.8  F (36.6  C) (Oral)   Resp 16   Wt 119.7 kg (264 lb)   SpO2 98%   Breastfeeding No   BMI 46.77 kg/m    Body mass index is 46.77 kg/m .  Physical Exam   GENERAL: healthy, alert and no distress  EYES: Eyes grossly normal to inspection, PERRL and conjunctivae and sclerae normal  HENT: ear canals and TM's normal, nose and mouth without ulcers or lesions, mucous membranes are moist  NECK: no adenopathy, no asymmetry, masses, or scars and thyroid normal to palpation  RESP: lungs clear to auscultation - no rales, rhonchi or wheezes  CV: regular rate and rhythm, normal S1 S2, no S3 or S4, no murmur, click or rub, no peripheral edema and peripheral pulses strong  ABDOMEN: soft, mild tenderness to palpation in right upper quadrant, no hepatosplenomegaly, no masses and bowel sounds normal  BACK: mild CVA tenderness on left side, no paralumbar tenderness

## 2021-03-05 ENCOUNTER — OFFICE VISIT (OUTPATIENT)
Dept: FAMILY MEDICINE | Facility: CLINIC | Age: 51
End: 2021-03-05
Payer: COMMERCIAL

## 2021-03-05 VITALS
BODY MASS INDEX: 46.77 KG/M2 | OXYGEN SATURATION: 98 % | SYSTOLIC BLOOD PRESSURE: 152 MMHG | HEART RATE: 94 BPM | TEMPERATURE: 97.8 F | DIASTOLIC BLOOD PRESSURE: 98 MMHG | WEIGHT: 264 LBS | RESPIRATION RATE: 16 BRPM

## 2021-03-05 DIAGNOSIS — N89.8 VAGINAL DISCHARGE: ICD-10-CM

## 2021-03-05 DIAGNOSIS — I10 HYPERTENSION GOAL BP (BLOOD PRESSURE) < 140/90: Primary | ICD-10-CM

## 2021-03-05 DIAGNOSIS — R19.7 DIARRHEA, UNSPECIFIED TYPE: ICD-10-CM

## 2021-03-05 DIAGNOSIS — R30.0 DYSURIA: ICD-10-CM

## 2021-03-05 LAB
ALBUMIN UR-MCNC: NEGATIVE MG/DL
ANION GAP SERPL CALCULATED.3IONS-SCNC: 6 MMOL/L (ref 3–14)
APPEARANCE UR: NORMAL
BASOPHILS # BLD AUTO: 0 10E9/L (ref 0–0.2)
BASOPHILS NFR BLD AUTO: 0.4 %
BILIRUB UR QL STRIP: NEGATIVE
BUN SERPL-MCNC: 16 MG/DL (ref 7–30)
CALCIUM SERPL-MCNC: 9.1 MG/DL (ref 8.5–10.1)
CHLORIDE SERPL-SCNC: 107 MMOL/L (ref 94–109)
CO2 SERPL-SCNC: 23 MMOL/L (ref 20–32)
COLOR UR AUTO: YELLOW
CREAT SERPL-MCNC: 0.89 MG/DL (ref 0.52–1.04)
DIFFERENTIAL METHOD BLD: NORMAL
EOSINOPHIL # BLD AUTO: 0.2 10E9/L (ref 0–0.7)
EOSINOPHIL NFR BLD AUTO: 2.5 %
ERYTHROCYTE [DISTWIDTH] IN BLOOD BY AUTOMATED COUNT: 12.9 % (ref 10–15)
GFR SERPL CREATININE-BSD FRML MDRD: 76 ML/MIN/{1.73_M2}
GLUCOSE SERPL-MCNC: 91 MG/DL (ref 70–99)
GLUCOSE UR STRIP-MCNC: NEGATIVE MG/DL
HCT VFR BLD AUTO: 42.2 % (ref 35–47)
HGB BLD-MCNC: 13.7 G/DL (ref 11.7–15.7)
HGB UR QL STRIP: NEGATIVE
KETONES UR STRIP-MCNC: NEGATIVE MG/DL
LEUKOCYTE ESTERASE UR QL STRIP: NEGATIVE
LYMPHOCYTES # BLD AUTO: 1.9 10E9/L (ref 0.8–5.3)
LYMPHOCYTES NFR BLD AUTO: 24.7 %
MCH RBC QN AUTO: 32.2 PG (ref 26.5–33)
MCHC RBC AUTO-ENTMCNC: 32.5 G/DL (ref 31.5–36.5)
MCV RBC AUTO: 99 FL (ref 78–100)
MONOCYTES # BLD AUTO: 0.5 10E9/L (ref 0–1.3)
MONOCYTES NFR BLD AUTO: 6 %
NEUTROPHILS # BLD AUTO: 5 10E9/L (ref 1.6–8.3)
NEUTROPHILS NFR BLD AUTO: 66.4 %
NITRATE UR QL: NEGATIVE
NON-SQ EPI CELLS #/AREA URNS LPF: NORMAL /LPF
PH UR STRIP: 6 PH (ref 5–7)
PLATELET # BLD AUTO: 259 10E9/L (ref 150–450)
POTASSIUM SERPL-SCNC: 4.3 MMOL/L (ref 3.4–5.3)
RBC # BLD AUTO: 4.26 10E12/L (ref 3.8–5.2)
RBC #/AREA URNS AUTO: NORMAL /HPF
SODIUM SERPL-SCNC: 136 MMOL/L (ref 133–144)
SOURCE: NORMAL
SP GR UR STRIP: 1.02 (ref 1–1.03)
SPECIMEN SOURCE: NORMAL
UROBILINOGEN UR STRIP-ACNC: 0.2 EU/DL (ref 0.2–1)
WBC # BLD AUTO: 7.5 10E9/L (ref 4–11)
WBC #/AREA URNS AUTO: NORMAL /HPF
WET PREP SPEC: NORMAL

## 2021-03-05 PROCEDURE — 85025 COMPLETE CBC W/AUTO DIFF WBC: CPT | Performed by: NURSE PRACTITIONER

## 2021-03-05 PROCEDURE — 81001 URINALYSIS AUTO W/SCOPE: CPT | Performed by: NURSE PRACTITIONER

## 2021-03-05 PROCEDURE — 80048 BASIC METABOLIC PNL TOTAL CA: CPT | Performed by: NURSE PRACTITIONER

## 2021-03-05 PROCEDURE — 99214 OFFICE O/P EST MOD 30 MIN: CPT | Performed by: NURSE PRACTITIONER

## 2021-03-05 PROCEDURE — 87210 SMEAR WET MOUNT SALINE/INK: CPT | Performed by: NURSE PRACTITIONER

## 2021-03-05 PROCEDURE — 36415 COLL VENOUS BLD VENIPUNCTURE: CPT | Performed by: NURSE PRACTITIONER

## 2021-03-05 RX ORDER — HYDROCHLOROTHIAZIDE 12.5 MG/1
12.5 TABLET ORAL DAILY
Qty: 90 TABLET | Refills: 1 | Status: SHIPPED | OUTPATIENT
Start: 2021-03-05 | End: 2021-06-17

## 2021-03-05 RX ORDER — CEPHALEXIN 250 MG/5ML
1000 POWDER, FOR SUSPENSION ORAL 2 TIMES DAILY
Qty: 400 ML | Refills: 0 | Status: CANCELLED | OUTPATIENT
Start: 2021-03-05 | End: 2021-03-15

## 2021-03-15 ENCOUNTER — TELEPHONE (OUTPATIENT)
Dept: FAMILY MEDICINE | Facility: CLINIC | Age: 51
End: 2021-03-15

## 2021-03-15 NOTE — TELEPHONE ENCOUNTER
Ya/ALEXANDER: Spoke with patient. She brought a form to her visit on 03/05/2021 and is wondering if the form has been completed and faxed or if she needs to pick it up at the clinic? She states that the form was for the [facility]. It is a medical form to verify her health status and work history for health insurance.    [place] Pt is requesting a prescription for nystatin powder. She states that she has a rash below her right breast. Her mom is a nurse and told her that it looked like a yeast infection and gave her some of her own nystatin powder. Pt has used it all up and states that the rash in is improved, but not gone. The rash was itching and raw- looked darker than skin color, pinkish in color. She does have a lot of sweating and area is moist at times due to larger breasts. She states that she has had some recent weight gain, she is currently working to lose weight and she is also wondering if she can use nystatin powder between her legs kind of in the groin area. She states that it is not as bad as under the breasts, but it gets moist between her legs and itchy at times and she is wondering if she can use nystatin powder on that too.    Pt also asked to review her results. Read result message to patient from labs 03/05. Pt verbalized understanding and had no further questions.

## 2021-03-15 NOTE — TELEPHONE ENCOUNTER
Form has been completed and was put in provider workroom outbox last week.    Have her set up an appointment, video is okay, for her rash so that I make sure I get her the appropriate treatment.     Ya Ortiz, APRN, CNP

## 2021-03-19 DIAGNOSIS — G89.29 CHRONIC LEFT-SIDED LOW BACK PAIN WITH RIGHT-SIDED SCIATICA: Primary | ICD-10-CM

## 2021-03-19 DIAGNOSIS — M54.41 CHRONIC LEFT-SIDED LOW BACK PAIN WITH RIGHT-SIDED SCIATICA: Primary | ICD-10-CM

## 2021-03-21 ENCOUNTER — HEALTH MAINTENANCE LETTER (OUTPATIENT)
Age: 51
End: 2021-03-21

## 2021-03-22 DIAGNOSIS — G89.29 CHRONIC LEFT-SIDED LOW BACK PAIN WITH RIGHT-SIDED SCIATICA: ICD-10-CM

## 2021-03-22 DIAGNOSIS — M54.41 CHRONIC LEFT-SIDED LOW BACK PAIN WITH RIGHT-SIDED SCIATICA: ICD-10-CM

## 2021-03-22 RX ORDER — DIAZEPAM 5 MG
5 TABLET ORAL
Qty: 5 TABLET | Refills: 0 | Status: SHIPPED | OUTPATIENT
Start: 2021-03-22 | End: 2021-04-06

## 2021-03-22 NOTE — PATIENT INSTRUCTIONS
1. XR and CT for shunt baseline  2. MRI lumbar for radiculopathy  3. Follow up in 3 months or after imaging is complete

## 2021-03-22 NOTE — TELEPHONE ENCOUNTER
[facility] Call Center    Phone Message    May a detailed message be left on voicemail: yes     Reason for Call: Other: Pt calling to ask if rx for diazePAM 5 MG Oral Tablet (VALIUM) can be sent to [facility] @ [address] [phone].  Rx has currently been filled at [facility] and pt has no way of getting there.  Please call if you have any questions.    Action Taken: Message routed to:  [facility] ([facility] NEUROS[facility]GERY    Travel Screening: Not Applicable

## 2021-03-23 ENCOUNTER — MYC MEDICAL ADVICE (OUTPATIENT)
Dept: FAMILY MEDICINE | Facility: CLINIC | Age: 51
End: 2021-03-23

## 2021-03-23 DIAGNOSIS — B37.2 CANDIDIASIS, INTERTRIGO: Primary | ICD-10-CM

## 2021-03-23 RX ORDER — NYSTATIN 100000 [USP'U]/G
POWDER TOPICAL
Qty: 120 G | Refills: 11 | Status: SHIPPED | OUTPATIENT
Start: 2021-03-23

## 2021-03-23 NOTE — TELEPHONE ENCOUNTER
See encounter 3/19/21 form has been faxed and a copy is in the chart.    Patient is calling and would like nistatatin powder sent over to her pharmacy.    [phone] ok to leave a message    Natasha Ahmadi,

## 2021-04-06 ENCOUNTER — TELEPHONE (OUTPATIENT)
Dept: NEUROSURGERY | Facility: CLINIC | Age: 51
End: 2021-04-06

## 2021-04-06 DIAGNOSIS — G89.29 CHRONIC LEFT-SIDED LOW BACK PAIN WITH RIGHT-SIDED SCIATICA: ICD-10-CM

## 2021-04-06 DIAGNOSIS — M54.41 CHRONIC LEFT-SIDED LOW BACK PAIN WITH RIGHT-SIDED SCIATICA: ICD-10-CM

## 2021-04-06 RX ORDER — DIAZEPAM 5 MG
5 TABLET ORAL
Qty: 5 TABLET | Refills: 0 | Status: SHIPPED | OUTPATIENT
Start: 2021-04-06 | End: 2021-05-12

## 2021-04-06 NOTE — TELEPHONE ENCOUNTER
I let pt know that we canceled the valium prescription at [facility] in [place] and called it in to the [facility] on [address], per her request.     Pt understands she will need a  to/from the MRI. Instructions are to take 1 tablet 30 minutes before MRI as needed for anxiety.     No further questions at this time.

## 2021-04-06 NOTE — TELEPHONE ENCOUNTER
Health Call Center     Phone Message     May a detailed message be left on voicemail: yes      Reason for Call: Other: Pt calling to ask if rx for diazePAM 5 MG Oral Tablet (VALIUM) can be sent to [facility] @ [address] [phone].  Rx incorrectly sent to [address] and pt has no way of getting there.      Please send Rx to [facility] @ [address] [phone].    Please call Pt if you have any questions.     Action Taken: Message routed to:  [facility] ([facility]): [facility] NEUROS[facility]GERY     Travel Screening: Not Applicable

## 2021-04-08 DIAGNOSIS — R07.89 CHEST WALL PAIN: ICD-10-CM

## 2021-04-11 NOTE — TELEPHONE ENCOUNTER
Routing refill request to provider for review/approval because:  BP Readings from Last 3 Encounters:   03/05/21 (!) 152/98   02/23/21 (!) 145/95   01/15/21 120/80

## 2021-04-12 RX ORDER — NAPROXEN 500 MG/1
TABLET ORAL
Qty: 30 TABLET | Refills: 0 | Status: SHIPPED | OUTPATIENT
Start: 2021-04-12 | End: 2021-07-26

## 2021-04-14 ENCOUNTER — TELEPHONE (OUTPATIENT)
Dept: NEUROSURGERY | Facility: CLINIC | Age: 51
End: 2021-04-14

## 2021-04-14 NOTE — TELEPHONE ENCOUNTER
Spoke with pt, as she missed her imaging appts yesterday morning and clinic appt yesterday afternoon. Pt said she got the days mixed up and would like to reschedule. I will ask our  to call her today to set up the appts. No further questions at this time.

## 2021-04-21 ENCOUNTER — TELEPHONE (OUTPATIENT)
Dept: FAMILY MEDICINE | Facility: CLINIC | Age: 51
End: 2021-04-21

## 2021-04-21 NOTE — TELEPHONE ENCOUNTER
"Patient calling in reporting a \"funny feeling\" in her right arm. She said she has had chest pain since October on the right side as well.     The feeling in her arm that she couldn't described came on about 20-30 min ago and has not gone away. She reported she previously had a blood clot in that arm. Patient calling to see if she should be seen today, nurse advised evaluation today is appropriate. Patient said she will go to the [facility] ER for evaluation.     Ya Verma RN    "

## 2021-05-11 ENCOUNTER — ANCILLARY PROCEDURE (OUTPATIENT)
Dept: GENERAL RADIOLOGY | Facility: CLINIC | Age: 51
End: 2021-05-11
Attending: NEUROLOGICAL SURGERY
Payer: COMMERCIAL

## 2021-05-11 ENCOUNTER — ANCILLARY PROCEDURE (OUTPATIENT)
Dept: MRI IMAGING | Facility: CLINIC | Age: 51
End: 2021-05-11
Attending: NEUROLOGICAL SURGERY
Payer: COMMERCIAL

## 2021-05-11 ENCOUNTER — OFFICE VISIT (OUTPATIENT)
Dept: NEUROSURGERY | Facility: CLINIC | Age: 51
End: 2021-05-11
Payer: COMMERCIAL

## 2021-05-11 ENCOUNTER — ANCILLARY PROCEDURE (OUTPATIENT)
Dept: CT IMAGING | Facility: CLINIC | Age: 51
End: 2021-05-11
Attending: NEUROLOGICAL SURGERY
Payer: COMMERCIAL

## 2021-05-11 VITALS
DIASTOLIC BLOOD PRESSURE: 97 MMHG | RESPIRATION RATE: 16 BRPM | OXYGEN SATURATION: 98 % | HEIGHT: 63 IN | WEIGHT: 266 LBS | BODY MASS INDEX: 47.13 KG/M2 | SYSTOLIC BLOOD PRESSURE: 134 MMHG | HEART RATE: 74 BPM

## 2021-05-11 DIAGNOSIS — E66.01 MORBID OBESITY (H): ICD-10-CM

## 2021-05-11 DIAGNOSIS — G93.2 IDIOPATHIC INTRACRANIAL HYPERTENSION: ICD-10-CM

## 2021-05-11 DIAGNOSIS — M54.17 LUMBOSACRAL RADICULOPATHY: Primary | ICD-10-CM

## 2021-05-11 DIAGNOSIS — G89.29 CHRONIC LEFT-SIDED LOW BACK PAIN WITH RIGHT-SIDED SCIATICA: ICD-10-CM

## 2021-05-11 DIAGNOSIS — M54.41 CHRONIC LEFT-SIDED LOW BACK PAIN WITH RIGHT-SIDED SCIATICA: ICD-10-CM

## 2021-05-11 DIAGNOSIS — E27.8 ADRENAL INCIDENTALOMA (H): ICD-10-CM

## 2021-05-11 LAB — RADIOLOGIST FLAGS: NORMAL

## 2021-05-11 PROCEDURE — 74021 RADEX ABDOMEN 3+ VIEWS: CPT | Mod: GC | Performed by: RADIOLOGY

## 2021-05-11 PROCEDURE — 71045 X-RAY EXAM CHEST 1 VIEW: CPT | Mod: GC | Performed by: RADIOLOGY

## 2021-05-11 PROCEDURE — 70450 CT HEAD/BRAIN W/O DYE: CPT | Performed by: STUDENT IN AN ORGANIZED HEALTH CARE EDUCATION/TRAINING PROGRAM

## 2021-05-11 PROCEDURE — 70250 X-RAY EXAM OF SKULL: CPT | Mod: GC | Performed by: RADIOLOGY

## 2021-05-11 PROCEDURE — 72148 MRI LUMBAR SPINE W/O DYE: CPT | Performed by: STUDENT IN AN ORGANIZED HEALTH CARE EDUCATION/TRAINING PROGRAM

## 2021-05-11 PROCEDURE — 99214 OFFICE O/P EST MOD 30 MIN: CPT | Performed by: NEUROLOGICAL SURGERY

## 2021-05-11 RX ORDER — GABAPENTIN 100 MG/1
100 CAPSULE ORAL 3 TIMES DAILY
Qty: 90 CAPSULE | Refills: 0 | Status: SHIPPED | OUTPATIENT
Start: 2021-05-11 | End: 2021-06-10

## 2021-05-11 ASSESSMENT — MIFFLIN-ST. JEOR: SCORE: 1800.7

## 2021-05-11 ASSESSMENT — PAIN SCALES - GENERAL: PAINLEVEL: MODERATE PAIN (4)

## 2021-05-11 NOTE — NURSING NOTE
Chief Complaint   Patient presents with     RECHECK     [facility] Return 3 mo follow-up     Alex Josue

## 2021-05-11 NOTE — LETTER
"5/11/2021       RE: Eunice Barrientos  [address] [place] [facility] [address]     Dear Colleague,    Thank you for referring your patient, Eunice Barrientos, to the [facility]UROSURGERY [facility]INIC [place] at [facility]. Please see a copy of my visit note below.    Neurosurgery Clinic Note    Reason for Visit: follow up radiculopathy and  shunt    History of Present Illness  Eunice Barrientos is a 51 year old woman with a history of IIH s/p  shunt and Right-sided EMG negative radiculopathy (likely L5/S1) who returns for follow up after imaging. Overall she tells me that she has been doing well except for her leg pain. She has had several acute on chronic episodes of radiculopathy. Between episodes she continues to have aching pain shooting down her leg, and she has had to quit her job as a . She has been spending more time at home, and her biggest issue now is her ability to sleep with the pain. Her pain radiates down through the thigh and down the back of her anatomic leg to the bottom of her foot.     She has no bowel or bladder issues. She has no numbness.           Allergies   Allergen Reactions     Terazol [Terconazole] Hives       Current Outpatient Medications   Medication     diazepam (VALIUM) 5 MG tablet     hydrochlorothiazide (HYDRODIURIL) 12.5 MG tablet     levothyroxine (SYNTHROID/LEVOTHROID) 125 MCG tablet     levothyroxine (SYNTHROID/LEVOTHROID) 137 MCG tablet     naproxen (NAPROSYN) 500 MG tablet     nystatin (MYCOSTATIN) 366374 UNIT/GM external powder     Prenatal Vit-Fe Fumarate-FA (PRENATAL MULTIVITAMIN  PLUS IRON) 27-0.8 MG TABS per tablet     No current facility-administered medications for this visit.        Physical Exam  BP (!) 134/97   Pulse 74   Resp 16   Ht 1.6 m (5' 3\")   Wt 120.7 kg (266 lb)   SpO2 98%   BMI 47.12 kg/m        General: Awake, alert, oriented. Well nourished, well developed, no acute distress.  HEENT: " Head normocephalic, atraumatic.   Abdomen: Soft, non-tender, non-distended. No hepatosplenomegaly.  Extremity: Warm with no clubbing or cyanosis. No lower extremity edema.    Neurological  Awake, alert and oriented to date, time, place and person. Speech fluent.   Pupils equal, round, reactive to light.  Extraocular movement intact.  Face symmetric.  Tongue midline.  Uvula elevates equally.    Motor: full strength throughout.  Sensation: intact to light touch and pinpoint.    ROS: 10 point ROS neg other than the symptoms noted above in the HPI.      Imaging: my interpretations only  5/11/21 MRI L: mild degeneration, mild narrowing R L5/S1, possible adrenal artifact  5/11 CTH: stable shunt and ventricles  5/11 XR skull: stable shunt system      Assessment and Plan   Eunice Barrientos is a 51 year old female with stable IIH s/p  shunt with reassuring/baseline imaging and Right-sided L5/S1 radiculopathy. Although this is EMG negative, her symptoms are quite classic. Imaging demonstrates no significant stenosis but her symptoms may be a result of potentiating chronic injury to the nerve. We discussed the possible ways forward for treating her radiculopathy and agreed to take a conservative approach. First we will assess the effect of an epidural injection to see if she can get some relief and provide some diagnostic information. We will simultaneously start gabapentin to see if we can get her some relief. If that does not help or does not last more than a couple of months, then we will discuss the option of spinal cord stimulation. Due to the likely artifact in the adrenal gland, we will order a dedicated MRI of the abdomen for further clarification.    1. Gabapentin 100 mg TID    2. MRI upper abdomen for adrenal gland. (with valium)    3. Epidural steroid injection L5/S1 with Dr. Garrison.    She can follow up with me pending her response.    Didier Jarvis MD  Department of Neurosurgery  [facility]

## 2021-05-11 NOTE — PROGRESS NOTES
"Neurosurgery Clinic Note    Reason for Visit: follow up radiculopathy and  shunt    History of Present Illness  Eunice Barrientos is a 51 year old woman with a history of IIH s/p  shunt and Right-sided EMG negative radiculopathy (likely L5/S1) who returns for follow up after imaging. Overall she tells me that she has been doing well except for her leg pain. She has had several acute on chronic episodes of radiculopathy. Between episodes she continues to have aching pain shooting down her leg, and she has had to quit her job as a . She has been spending more time at home, and her biggest issue now is her ability to sleep with the pain. Her pain radiates down through the thigh and down the back of her anatomic leg to the bottom of her foot.     She has no bowel or bladder issues. She has no numbness.           Allergies   Allergen Reactions     Terazol [Terconazole] Hives       Current Outpatient Medications   Medication     diazepam (VALIUM) 5 MG tablet     hydrochlorothiazide (HYDRODIURIL) 12.5 MG tablet     levothyroxine (SYNTHROID/LEVOTHROID) 125 MCG tablet     levothyroxine (SYNTHROID/LEVOTHROID) 137 MCG tablet     naproxen (NAPROSYN) 500 MG tablet     nystatin (MYCOSTATIN) 279321 UNIT/GM external powder     Prenatal Vit-Fe Fumarate-FA (PRENATAL MULTIVITAMIN  PLUS IRON) 27-0.8 MG TABS per tablet     No current facility-administered medications for this visit.              Physical Exam  BP (!) 134/97   Pulse 74   Resp 16   Ht 1.6 m (5' 3\")   Wt 120.7 kg (266 lb)   SpO2 98%   BMI 47.12 kg/m        General: Awake, alert, oriented. Well nourished, well developed, no acute distress.  HEENT: Head normocephalic, atraumatic.   Abdomen: Soft, non-tender, non-distended. No hepatosplenomegaly.  Extremity: Warm with no clubbing or cyanosis. No lower extremity edema.    Neurological  Awake, alert and oriented to date, time, place and person. Speech fluent.   Pupils equal, round, reactive to " light.  Extraocular movement intact.  Face symmetric.  Tongue midline.  Uvula elevates equally.    Motor: full strength throughout.  Sensation: intact to light touch and pinpoint.    ROS: 10 point ROS neg other than the symptoms noted above in the HPI.        Imaging: my interpretations only  5/11/21 MRI L: mild degeneration, mild narrowing R L5/S1, possible adrenal artifact  5/11 CTH: stable shunt and ventricles  5/11 XR skull: stable shunt system        Assessment and Plan   Eunice Barrientos is a 51 year old female with stable IIH s/p  shunt with reassuring/baseline imaging and Right-sided L5/S1 radiculopathy. Although this is EMG negative, her symptoms are quite classic. Imaging demonstrates no significant stenosis but her symptoms may be a result of potentiating chronic injury to the nerve. We discussed the possible ways forward for treating her radiculopathy and agreed to take a conservative approach. First we will assess the effect of an epidural injection to see if she can get some relief and provide some diagnostic information. We will simultaneously start gabapentin to see if we can get her some relief. If that does not help or does not last more than a couple of months, then we will discuss the option of spinal cord stimulation. Due to the likely artifact in the adrenal gland, we will order a dedicated MRI of the abdomen for further clarification.    1. Gabapentin 100 mg TID    2. MRI upper abdomen for adrenal gland. (with valium)    3. Epidural steroid injection L5/S1 with Dr. Garrison.    She can follow up with me pending her response.      Didier Jarvis MD  Department of Neurosurgery  [facility]

## 2021-05-12 RX ORDER — DIAZEPAM 5 MG
5 TABLET ORAL
Qty: 5 TABLET | Refills: 0 | Status: SHIPPED | OUTPATIENT
Start: 2021-05-12

## 2021-05-17 ENCOUNTER — TELEPHONE (OUTPATIENT)
Dept: NEUROSURGERY | Facility: CLINIC | Age: 51
End: 2021-05-17

## 2021-05-17 NOTE — TELEPHONE ENCOUNTER
Health Call Center    Phone Message    May a detailed message be left on voicemail: yes     Reason for Call: Medication Question or concern regarding medication   Prescription Clarification  Name of Medication: gabapentin (NEURONTIN) 100 MG capsule  Prescribing Provider: Dr. Jarvis   Pharmacy: [address]   What on the order needs clarification? Eunice stated that she has been having the side effects that are listed for this medication prior to starting to take it. She is wondering if she should start due to the symptoms or if she should wait. Please call Eunice at your earliest convenience to discuss. She is unsure if the symptoms she is having (tiredness, nausea and headaches) are from her spot on her pituitary gland.    Action Taken: Message routed to:  [facility] ([facility]    Travel Screening: Not Applicable

## 2021-05-17 NOTE — TELEPHONE ENCOUNTER
Pt states that she has had several weeks of tiredness, nausea and headache. She was prescribed gabapentin 100 mg TID by Dr. Jarvis and has not started it b/c the side effects are similar to the symptoms she is already experiencing. The dosage prescribed is a relatively low dose, but pt would like a recommendation from Dr. Jarvis about whether she should start it.     Pt has not seen PCP about these symptoms. She wanted to know if they may be stemming from the potential issue with the left adrenal gland. She has an MRI on 6/7 to clarify possible artifact that was seen on a recent lumbar spine MRI. Since we need clarification on the adrenal gland, the connection is not known.     I will follow-up with Dr. Jarvis re recommendation for the gabapentin and call pt back. She is in agreement with plan. No further questions at this time.

## 2021-05-18 NOTE — TELEPHONE ENCOUNTER
In follow-up to 5/17 discussion about gabapentin, I spoke with Dr. Jarvis. He agreed that the dose he prescribed is very low and unlikely to exacerbate the nausea, headache and tiredness she is experiencing. However, if she wants to wait to start it, that is fine.     Pt says she has a PCP appointment tomorrow and will discuss the symptoms at that appt.    She will call with any further questions or concerns.

## 2021-05-19 ENCOUNTER — TELEPHONE (OUTPATIENT)
Dept: ENDOCRINOLOGY | Facility: CLINIC | Age: 51
End: 2021-05-19

## 2021-05-19 ENCOUNTER — OFFICE VISIT (OUTPATIENT)
Dept: FAMILY MEDICINE | Facility: CLINIC | Age: 51
End: 2021-05-19
Payer: COMMERCIAL

## 2021-05-19 VITALS
HEART RATE: 74 BPM | TEMPERATURE: 98.4 F | OXYGEN SATURATION: 98 % | SYSTOLIC BLOOD PRESSURE: 127 MMHG | HEIGHT: 63 IN | WEIGHT: 269.6 LBS | RESPIRATION RATE: 20 BRPM | DIASTOLIC BLOOD PRESSURE: 85 MMHG | BODY MASS INDEX: 47.77 KG/M2

## 2021-05-19 DIAGNOSIS — E03.9 ACQUIRED HYPOTHYROIDISM: ICD-10-CM

## 2021-05-19 DIAGNOSIS — Z11.59 NEED FOR HEPATITIS C SCREENING TEST: ICD-10-CM

## 2021-05-19 DIAGNOSIS — E27.9 LESION OF ADRENAL GLAND (H): Primary | ICD-10-CM

## 2021-05-19 DIAGNOSIS — Z11.4 SCREENING FOR HIV (HUMAN IMMUNODEFICIENCY VIRUS): ICD-10-CM

## 2021-05-19 DIAGNOSIS — R53.83 FATIGUE, UNSPECIFIED TYPE: ICD-10-CM

## 2021-05-19 DIAGNOSIS — Z13.220 SCREENING FOR HYPERLIPIDEMIA: ICD-10-CM

## 2021-05-19 DIAGNOSIS — G47.00 PERSISTENT INSOMNIA: ICD-10-CM

## 2021-05-19 LAB
CHOLEST SERPL-MCNC: 183 MG/DL
CORTIS SERPL-MCNC: 6.1 UG/DL (ref 4–22)
DEPRECATED CALCIDIOL+CALCIFEROL SERPL-MC: 28 UG/L (ref 20–75)
HCV AB SERPL QL IA: NONREACTIVE
HDLC SERPL-MCNC: 45 MG/DL
HIV 1+2 AB+HIV1 P24 AG SERPL QL IA: NONREACTIVE
LDLC SERPL CALC-MCNC: 99 MG/DL
NONHDLC SERPL-MCNC: 138 MG/DL
POTASSIUM SERPL-SCNC: 4.4 MMOL/L (ref 3.4–5.3)
TRIGL SERPL-MCNC: 196 MG/DL
TSH SERPL DL<=0.005 MIU/L-ACNC: 2.39 MU/L (ref 0.4–4)

## 2021-05-19 PROCEDURE — 84443 ASSAY THYROID STIM HORMONE: CPT | Performed by: NURSE PRACTITIONER

## 2021-05-19 PROCEDURE — 80061 LIPID PANEL: CPT | Performed by: NURSE PRACTITIONER

## 2021-05-19 PROCEDURE — 82533 TOTAL CORTISOL: CPT | Performed by: NURSE PRACTITIONER

## 2021-05-19 PROCEDURE — 82306 VITAMIN D 25 HYDROXY: CPT | Performed by: NURSE PRACTITIONER

## 2021-05-19 PROCEDURE — 84132 ASSAY OF SERUM POTASSIUM: CPT | Performed by: NURSE PRACTITIONER

## 2021-05-19 PROCEDURE — 86803 HEPATITIS C AB TEST: CPT | Performed by: NURSE PRACTITIONER

## 2021-05-19 PROCEDURE — 87389 HIV-1 AG W/HIV-1&-2 AB AG IA: CPT | Performed by: NURSE PRACTITIONER

## 2021-05-19 PROCEDURE — 36415 COLL VENOUS BLD VENIPUNCTURE: CPT | Performed by: NURSE PRACTITIONER

## 2021-05-19 PROCEDURE — 99214 OFFICE O/P EST MOD 30 MIN: CPT | Performed by: NURSE PRACTITIONER

## 2021-05-19 RX ORDER — LANOLIN ALCOHOL/MO/W.PET/CERES
3 CREAM (GRAM) TOPICAL
Qty: 90 TABLET | Refills: 1 | Status: SHIPPED | OUTPATIENT
Start: 2021-05-19

## 2021-05-19 ASSESSMENT — PAIN SCALES - GENERAL: PAINLEVEL: MODERATE PAIN (4)

## 2021-05-19 ASSESSMENT — MIFFLIN-ST. JEOR: SCORE: 1817.03

## 2021-05-19 NOTE — TELEPHONE ENCOUNTER
Endocrine triage  The scheduled first available endocrine appointment timeframe is acceptable  Paulina June MD

## 2021-05-19 NOTE — TELEPHONE ENCOUNTER
Health Call Center    Phone Message    May a detailed message be left on voicemail: yes     Reason for Call: Other: Patient is being referred by Ya Ortiz to be seen for Lesion of adrenal gland; Acquired hypothyroidism; Fatigue, unspecified type. Patient is currently scheduled on 6/9/21 at 2:00pm with Dr. Robbins at the [place] location. Appointment is outside of 2 week time frame per guidelines. Sending encounter for review to determine if sooner opening is needed. Please review and call patient to schedule.      Action Taken: Message routed to:  [facility] ([facility]): Endocrinology    Travel Screening: Not Applicable

## 2021-05-19 NOTE — PROGRESS NOTES
Assessment & Plan     (E27.9) Lesion of adrenal gland (H)  (primary encounter diagnosis)  Comment: Dedicated adrenal MRI is scheduled.   Plan: ADULT ENDOCRINOLOGY REFERRAL, Aldosterone         urine, Catecholamines fractioned free urine,         Cortisol, Metanephrine random or 24 hr urine,         Potassium          (Z11.4) Screening for HIV (human immunodeficiency virus)  Comment: recommended  Plan: HIV Antigen Antibody Combo          (Z11.59) Need for hepatitis C screening test  Comment: recommended  Plan: Hepatitis C Screen Reflex to HCV RNA Quant and         Genotype          (Z13.220) Screening for hyperlipidemia  Comment: recommened  Plan: Lipid panel reflex to direct LDL Fasting          (E03.9) Acquired hypothyroidism  Plan: ADULT ENDOCRINOLOGY REFERRAL, TSH with free T4         reflex          (R53.83) Fatigue, unspecified type  Comment: Recent CBC, BMP unremarkable. Will recheck TSH. May be 2/2 frequent waking at night due to pain.  Plan: ADULT ENDOCRINOLOGY REFERRAL, Vitamin D         Deficiency, TSH with free T4 reflex          (G47.00) Persistent insomnia  Comment: May be 2/2 frequent waking at night due to pain. Denies snoring or apneas.   Plan: melatonin 3 MG tablet        Start gabapentin as prescribed by neurosurgery.    Return in about 4 weeks (around 6/16/2021), or if symptoms worsen or fail to improve.    ABIOLA Lanier CNP  [facility] TOMA Dawson is a 51 year old who presents for the following health issues     HPI     Concerns   Onset: Months, worsening  Description: Feeling very tired, nausea, vomiting, joint pain, weight gain, trouble falling asleep.   Intensity: severe  Previous history of similar problem: No   Therapies tried and outcome: None  States that she has chronic insomnia. States that she wakes up at night due to leg and right upper arm/chest pain. States that she is getting a couple of hours of sleep/night. Denies snoring. Denies  "napping during the day.  States that she has not started the gabapentin for nerve pain yet.     Also, she is here for further clarification of possible adrenal artifact seen on MRI lumbar.     \" MR LUMBAR SPINE W/O CONTRAST 5/11/2021 12:00 PM     History: Low back pain, prior surgery, new symptoms; Right sided  radiculopathy L5; Chronic left-sided low back pain with right-sided  sciatica; Chronic left-sided low back pain with right-sided sciatica.     ICD-10: Chronic left-sided low back pain with right-sided sciatica;  Chronic left-sided low back pain with right-sided sciatica     Comparison: Radiograph 6/12/2019     Technique: Sagittal STIR, T1-weighted turbo spin-echo, 3-D volumetric  T2-weighted and axial reconstructed T2-weighted images of the lumbar  spine were obtained without intravenous contrast.      Findings: There are 5 lumbar-type vertebrae counting from C2.  The tip  of the conus medullaris is at  L1. Trace anterolisthesis at L5-S1  similar to prior radiograph. Posterior and interbody fixation of L5  and S1 with pedicle screws, associated susceptibility artifacts.  Sclerotic endplate changes in the opposing endplates of L5-S1. No bone  marrow abnormality. There is no significant disc height narrowing at  any level. No vertebral height loss. No pars defect identified. Bone  marrow signal intensity is within normal limits and no abnormal signal  is visible.      On a level by level basis:     L1-2: No significant spinal canal or foraminal narrowing.     L2-3: No significant spinal canal or foraminal narrowing.     L3-4: No significant spinal canal or foraminal narrowing.     L4-5: No significant spinal canal or foraminal narrowing.     L5-S1: Tiny residual disc bulge asymmetric to the right with abutment  of the right exiting L5. No neural impingement. No significant spinal  canal or neural foramen stenosis. ]     Questionable incompletely characterized lesion in the left adrenal  glands, only seen in the " "upper most slices (series 100 image 195) may  represent artifact versus true adrenal gland lesion.                                                                      Impression:  1. Postsurgical changes of posterior and interbody fusion at L5-S1.  Residual trace anterolisthesis. Residual tiny disc bulge asymmetric to  the right with abutment of right exiting L5. No obvious neural  impingement or spinal canal stenosis.  2. Incompletely characterized possible lesion in the left adrenal  gland, seen in the superior most slice axial T2-weighted images. This  can be artifact versus true lesion. Recommend dedicated upper abdomen  MRI for adrenal gland lesions.   [Consider Follow Up: Adrenal gland lesion]     This report will be copied to the [facility] to ensure a  provider acknowledges the finding. \"    Review of Systems   Constitutional, HEENT, cardiovascular, pulmonary, gi and gu systems are negative, except as otherwise noted.      Objective    /85 (BP Location: Left arm, Patient Position: Sitting, Cuff Size: Adult Large)   Pulse 74   Temp 98.4  F (36.9  C) (Oral)   Resp 20   Ht 1.6 m (5' 3\")   Wt 122.3 kg (269 lb 9.6 oz)   SpO2 98%   BMI 47.76 kg/m    Body mass index is 47.76 kg/m .  Physical Exam   GENERAL: healthy, alert and no distress  EYES: Eyes grossly normal to inspection, PERRL and conjunctivae and sclerae normal  PSYCH: mentation appears normal, affect normal/bright    Ancillary Procedure on 05/11/2021   Component Date Value Ref Range Status     Radiologist flags 05/11/2021 Adrenal gland lesion   Final           "

## 2021-05-28 DIAGNOSIS — E27.9 LESION OF ADRENAL GLAND (H): ICD-10-CM

## 2021-05-28 PROCEDURE — 99000 SPECIMEN HANDLING OFFICE-LAB: CPT | Performed by: NURSE PRACTITIONER

## 2021-05-28 PROCEDURE — 82088 ASSAY OF ALDOSTERONE: CPT | Mod: 90 | Performed by: NURSE PRACTITIONER

## 2021-05-28 PROCEDURE — 82384 ASSAY THREE CATECHOLAMINES: CPT | Mod: 90 | Performed by: NURSE PRACTITIONER

## 2021-05-28 PROCEDURE — 36415 COLL VENOUS BLD VENIPUNCTURE: CPT | Performed by: NURSE PRACTITIONER

## 2021-05-28 PROCEDURE — 83835 ASSAY OF METANEPHRINES: CPT | Mod: 90 | Performed by: NURSE PRACTITIONER

## 2021-05-31 LAB
CATECHOLS UR-IMP: ABNORMAL
COLLECT DURATION TIME SPEC: ABNORMAL H
CREAT 24H UR-MRATE: 1714 MG/D (ref 700–1600)
CREAT UR-MCNC: 82 MG/DL
DOPAMINE 24H UR-MRATE: 357 UG/D (ref 71–485)
DOPAMINE UR-MCNC: 2 UG/L
DOPAMINE/CREAT UR: 209 UG/G CRT (ref 0–250)
EPINEPH 24H UR-MRATE: 4 UG/D (ref 1–14)
EPINEPH UR-MCNC: 171 UG/L
EPINEPH/CREAT UR: 2 UG/G CRT (ref 0–20)
NOREPINEPH 24H UR-MRATE: 54 UG/D (ref 14–120)
NOREPINEPH UR-MCNC: 26 UG/L
NOREPINEPH/CREAT UR: 32 UG/G CRT (ref 0–45)
SPECIMEN VOL ?TM UR: ABNORMAL ML

## 2021-06-01 ENCOUNTER — TELEPHONE (OUTPATIENT)
Dept: FAMILY MEDICINE | Facility: CLINIC | Age: 51
End: 2021-06-01

## 2021-06-01 DIAGNOSIS — R11.2 NAUSEA AND VOMITING, INTRACTABILITY OF VOMITING NOT SPECIFIED, UNSPECIFIED VOMITING TYPE: Primary | ICD-10-CM

## 2021-06-01 DIAGNOSIS — R10.32 LLQ ABDOMINAL PAIN: ICD-10-CM

## 2021-06-01 NOTE — TELEPHONE ENCOUNTER
Patient called the clinic.  Patient continues to have odor in urine, increased fatigue, left lower back, nausea and emesis X 2 days ago.  Patient states she is still not feeling well and inquiring if other labs can be ordered for her continuous symptoms.  Patient denies any other symptoms or concerns at this time.

## 2021-06-01 NOTE — TELEPHONE ENCOUNTER
She should keep her appointment with endocrinology for recommendations. I have also put in a referral to GI for the nausea/vomiting, lower left pain.    Gastroenterology Adult Ref Consult Only    [facility]   [address] [address]   Phone: [phone]          Please call the facility your provider referred you to schedule your appointment     ABIOLA Mar, CNP

## 2021-06-01 NOTE — TELEPHONE ENCOUNTER
Called and informed patient of the provider's recommendations.  Patient verbalized understanding and has no further questions or concerns at this time.

## 2021-06-02 ENCOUNTER — TELEPHONE (OUTPATIENT)
Dept: ENDOCRINOLOGY | Facility: CLINIC | Age: 51
End: 2021-06-02

## 2021-06-02 LAB
ALDOST 24H UR-MRATE: 7.2 UG/D (ref 1.2–28.1)
COLLECT DURATION TIME SPEC: ABNORMAL H
COLLECT DURATION TIME UR: 24 HR
CREAT 24H UR-MRATE: 1839 MG/D (ref 700–1600)
CREAT 24H UR-MRATE: 1881 MG/D (ref 700–1600)
CREAT UR-MCNC: 88 MG/DL
CREAT UR-MCNC: 90 MG/DL
METANEPH 24H UR-MCNC: 54 UG/L
METANEPH 24H UR-MRATE: 113 UG/D (ref 36–229)
METANEPH+NORMETANEPH UR-IMP: ABNORMAL
METANEPH/CREAT 24H UR: 60 UG/G CRT (ref 0–300)
NORMETANEPHRINE 24H UR-MCNC: 155 UG/L
NORMETANEPHRINE 24H UR-MRATE: 324 UG/D (ref 95–650)
NORMETANEPHRINE/CREAT 24H UR: 172 UG/G CRT (ref 0–400)
SPECIMEN VOL ?TM UR: 2090 ML
SPECIMEN VOL ?TM UR: ABNORMAL ML

## 2021-06-02 NOTE — TELEPHONE ENCOUNTER
Called and scheduled patient a sooner visit with Dr. Robbins tomorrow morning at 8 AM. Patient was advised to go to the ED this evening for chest pain and associated SOB.    Guille HUBER RN....6/2/2021 1:29 PM

## 2021-06-02 NOTE — TELEPHONE ENCOUNTER
[facility] Call Center    Phone Message    May a detailed message be left on voicemail: yes     Reason for Call: Symptoms or Concerns     If patient has red-flag symptoms, warm transfer to triage line    Current symptom or concern: Chest pain; sweating all of the time; can barely walk up and down stairs; leg pain/aches     Symptoms have been present for: Chest pain since September.  Other symptoms last two weeks  Has patient previously been seen for this? Yes    By [facility] yesterday    Date: 6/1/21    Are there any new or worsening symptoms? Yes: extremely fatigued- chest pain worsening.     Pt is concerned with waiting until next week.  [place] review and call pt.     Action Taken: Message routed to:  [facility] endo    Travel Screening: Not Applicable

## 2021-06-03 ENCOUNTER — VIRTUAL VISIT (OUTPATIENT)
Dept: ENDOCRINOLOGY | Facility: CLINIC | Age: 51
End: 2021-06-03
Payer: COMMERCIAL

## 2021-06-03 DIAGNOSIS — R53.83 FATIGUE, UNSPECIFIED TYPE: ICD-10-CM

## 2021-06-03 DIAGNOSIS — E27.9 LESION OF ADRENAL GLAND (H): ICD-10-CM

## 2021-06-03 DIAGNOSIS — R82.90 FOUL SMELLING URINE: Primary | ICD-10-CM

## 2021-06-03 DIAGNOSIS — E03.9 ACQUIRED HYPOTHYROIDISM: ICD-10-CM

## 2021-06-03 PROCEDURE — 99204 OFFICE O/P NEW MOD 45 MIN: CPT | Mod: 95 | Performed by: INTERNAL MEDICINE

## 2021-06-03 NOTE — PROGRESS NOTES
Eunice is a 51 year old who is being evaluated via a billable video visit.      How would you like to obtain your AVS? MyChart  If the video visit is dropped, the invitation should be resent by: Text to cell phone: [phone]  Will anyone else be joining your video visit? No      Video Start Time: 7:55 AM    CC: Adrenal mass and hypothyroidism.    HPI:   Patient presents for evaluation of an adrenal mass and hypothyroidism.   The adrenal mass was found incidentally on a MRI for back pain.     Last fall she began having pain in her chest similar to when she had a blood clot in the past.   She did not have a clot and her shunt is functioning well. Pain continues and at times is bad enough that she cannot comb her hair. She has also been having pain in her leg on the same (right) side as well. This lead to her having a MRI.     Fatigue ongoing for months.   She is not sleeping much due to her pain.   Up to 5 hours a night.   No snoring.     Notes she has also been sweating easily.   She has gained 40 pounds in the last year.   Feels she has developed wheezing with both rest and activity.     She needed medication for HTN during her pregnancies but nothing in between.   It was ~1 year ago that she began treatment again.     She has been having intermittent nausea and vomiting over the last few weeks. Seems to occur shortly after eating.     ROS: 10 point ROS neg other than the symptoms noted above in the HPI.    PMH:   Patient Active Problem List   Diagnosis     Hypothyroidism     Intracranial shunt     CARDIOVASCULAR SCREENING; LDL GOAL LESS THAN 130     Hypertension goal BP (blood pressure) < 140/90     Macular degeneration, dry, mod, od; dry with foveal scar, os     Morbid obesity (H)     Chronic left-sided low back pain with right-sided sciatica     History of pulmonary embolism      Meds:  Current Outpatient Medications   Medication     diazepam (VALIUM) 5 MG tablet     gabapentin (NEURONTIN) 100 MG capsule      "hydrochlorothiazide (HYDRODIURIL) 12.5 MG tablet     levothyroxine (SYNTHROID/LEVOTHROID) 125 MCG tablet     levothyroxine (SYNTHROID/LEVOTHROID) 137 MCG tablet     melatonin 3 MG tablet     naproxen (NAPROSYN) 500 MG tablet     nystatin (MYCOSTATIN) 433729 UNIT/GM external powder     Prenatal Vit-Fe Fumarate-FA (PRENATAL MULTIVITAMIN  PLUS IRON) 27-0.8 MG TABS per tablet     No current facility-administered medications for this visit.       FHX:   Mother had \"thyroid problem\"  No adrenal disease.     SHX:  Non-smoker.     Exam:   GENERAL: Healthy, alert and no distress  EYES: Eyes grossly normal to inspection.  No discharge or erythema, or obvious scleral/conjunctival abnormalities.  NECK: No asymmetry, visible masses or scars  RESP: No audible wheeze, cough, or visible cyanosis.  No visible retractions or increased work of breathing.    MS: No gross musculoskeletal defects noted.  Normal range of motion.  No visible edema.  SKIN: Visible skin clear. No significant rash, abnormal pigmentation or lesions.  NEURO: Cranial nerves grossly intact.  Mentation and speech appropriate for age.  PSYCH: Mentation appears normal, affect normal/bright, judgement and insight intact, normal speech and appearance well-groomed.     A/P:   Adrenal mass - Workup to date reviewed with the patient. No excess metanephrines or catecholamines in 24 hour urine. The mass itself is not clearly characterized as it was an incidental finding. Thus I cannot comment on malignant potential. A dedicated MRI of the adrenals has been ordered. Explained that I cannot clearly relate the mass to her symptoms at this time.   -Follow up MRI.   -Labs for aldosterone, renin, BMP.   -Salivary cortisol, late night, X2.    Hypothyroidism - Extensive discussion of thyroid hormone and normal physiology. Included was discussion of thyroid in relation to weight and energy. Discussed how her fatigue is most likely 2/2 her lack of sleep. TSH was normal in 5/2021. "   -No change to levothyroxine dose.     Foul smelling urine - patient notes this is a new issue. No dysuria.   -Check UA.     Video-Visit Details    Type of service:  Video Visit    Video End Time:8:25 AM    Originating Location (pt. Location): Home    Distant Location (provider location):  [facility] ENDOCRINOLOGY     Platform used for Video Visit: Ge Robbins MD on 6/3/2021 at 8:25 AM

## 2021-06-03 NOTE — LETTER
6/3/2021         RE: Eunice Barrientos  [address] [facility] [address] [place] [id number]        Dear Colleague,    Thank you for referring your patient, uEnice Barrientos, to the [facility] ENDOCRINOLOGY. Please see a copy of my visit note below.    Eunice is a 51 year old who is being evaluated via a billable video visit.      How would you like to obtain your AVS? MyChart  If the video visit is dropped, the invitation should be resent by: Text to cell phone: [phone]  Will anyone else be joining your video visit? No      Video Start Time: 7:55 AM    CC: Adrenal mass and hypothyroidism.    HPI:   Patient presents for evaluation of an adrenal mass and hypothyroidism.   The adrenal mass was found incidentally on a MRI for back pain.     Last fall she began having pain in her chest similar to when she had a blood clot in the past.   She did not have a clot and her shunt is functioning well. Pain continues and at times is bad enough that she cannot comb her hair. She has also been having pain in her leg on the same (right) side as well. This lead to her having a MRI.     Fatigue ongoing for months.   She is not sleeping much due to her pain.   Up to 5 hours a night.   No snoring.     Notes she has also been sweating easily.   She has gained 40 pounds in the last year.   Feels she has developed wheezing with both rest and activity.     She needed medication for HTN during her pregnancies but nothing in between.   It was ~1 year ago that she began treatment again.     She has been having intermittent nausea and vomiting over the last few weeks. Seems to occur shortly after eating.     ROS: 10 point ROS neg other than the symptoms noted above in the HPI.    PMH:   Patient Active Problem List   Diagnosis     Hypothyroidism     Intracranial shunt     CARDIOVASCULAR SCREENING; LDL GOAL LESS THAN 130     Hypertension goal BP (blood pressure) < 140/90     Macular degeneration, dry, mod, od; dry with foveal scar, os  "    Morbid obesity (H)     Chronic left-sided low back pain with right-sided sciatica     History of pulmonary embolism      Meds:  Current Outpatient Medications   Medication     diazepam (VALIUM) 5 MG tablet     gabapentin (NEURONTIN) 100 MG capsule     hydrochlorothiazide (HYDRODIURIL) 12.5 MG tablet     levothyroxine (SYNTHROID/LEVOTHROID) 125 MCG tablet     levothyroxine (SYNTHROID/LEVOTHROID) 137 MCG tablet     melatonin 3 MG tablet     naproxen (NAPROSYN) 500 MG tablet     nystatin (MYCOSTATIN) 547014 UNIT/GM external powder     Prenatal Vit-Fe Fumarate-FA (PRENATAL MULTIVITAMIN  PLUS IRON) 27-0.8 MG TABS per tablet     No current facility-administered medications for this visit.       FHX:   Mother had \"thyroid problem\"  No adrenal disease.     SHX:  Non-smoker.     Exam:   GENERAL: Healthy, alert and no distress  EYES: Eyes grossly normal to inspection.  No discharge or erythema, or obvious scleral/conjunctival abnormalities.  NECK: No asymmetry, visible masses or scars  RESP: No audible wheeze, cough, or visible cyanosis.  No visible retractions or increased work of breathing.    MS: No gross musculoskeletal defects noted.  Normal range of motion.  No visible edema.  SKIN: Visible skin clear. No significant rash, abnormal pigmentation or lesions.  NEURO: Cranial nerves grossly intact.  Mentation and speech appropriate for age.  PSYCH: Mentation appears normal, affect normal/bright, judgement and insight intact, normal speech and appearance well-groomed.     A/P:   Adrenal mass - Workup to date reviewed with the patient. No excess metanephrines or catecholamines in 24 hour urine. The mass itself is not clearly characterized as it was an incidental finding. Thus I cannot comment on malignant potential. A dedicated MRI of the adrenals has been ordered. Explained that I cannot clearly relate the mass to her symptoms at this time.   -Follow up MRI.   -Labs for aldosterone, renin, BMP.   -Salivary cortisol, late " night, X2.    Hypothyroidism - Extensive discussion of thyroid hormone and normal physiology. Included was discussion of thyroid in relation to weight and energy. Discussed how her fatigue is most likely 2/2 her lack of sleep. TSH was normal in 5/2021.   -No change to levothyroxine dose.     Foul smelling urine - patient notes this is a new issue. No dysuria.   -Check UA.     Video-Visit Details    Type of service:  Video Visit    Video End Time:8:25 AM    Originating Location (pt. Location): Home    Distant Location (provider location):  [facility] ENDOCRINOLOGY     Platform used for Video Visit: "[employer]     Zeke Robbins MD on 6/3/2021 at 8:25 AM          Again, thank you for allowing me to participate in the care of your patient.        Sincerely,        Zeke Robbins MD

## 2021-06-07 ENCOUNTER — ANCILLARY PROCEDURE (OUTPATIENT)
Dept: MRI IMAGING | Facility: CLINIC | Age: 51
End: 2021-06-07
Attending: NEUROLOGICAL SURGERY
Payer: COMMERCIAL

## 2021-06-07 DIAGNOSIS — E27.8 ADRENAL INCIDENTALOMA (H): ICD-10-CM

## 2021-06-07 PROCEDURE — A9585 GADOBUTROL INJECTION: HCPCS | Performed by: RADIOLOGY

## 2021-06-07 PROCEDURE — 74183 MRI ABD W/O CNTR FLWD CNTR: CPT | Performed by: RADIOLOGY

## 2021-06-07 RX ORDER — GADOBUTROL 604.72 MG/ML
15 INJECTION INTRAVENOUS ONCE
Status: COMPLETED | OUTPATIENT
Start: 2021-06-07 | End: 2021-06-07

## 2021-06-07 RX ADMIN — GADOBUTROL 12 ML: 604.72 INJECTION INTRAVENOUS at 08:05

## 2021-06-07 NOTE — DISCHARGE INSTRUCTIONS
MRI Contrast Discharge Instructions    The IV contrast you received today will pass out of your body in your  urine. This will happen in the next 24 hours. You will not feel this process.  Your urine will not change color.    Drink at least 4 extra glasses of water or juice today (unless your doctor  has restricted your fluids). This reduces the stress on your kidneys.  You may take your regular medicines.    If you are on dialysis: It is best to have dialysis today.    If you have a reaction: Most reactions happen right away. If you have  any new symptoms after leaving the hospital (such as hives or swelling),  call your hospital at the correct number below. Or call your family doctor.  If you have breathing distress or wheezing, call 911.    Special instructions: ***    I have read and understand the above information.    Signature:______________________________________ Date:___________    Staff:__________________________________________ Date:___________     Time:__________    [facility] Departments:    ___[place]: [phone]  ___[facility]: [phone]  ___[place]: [phone] ___[facility]: [phone]  ___[facility]: [phone]  ___[facility]: [phone]  ___[phone]  ___[facility]: [phone]  ___[phone]

## 2021-06-07 NOTE — PROGRESS NOTES
S: Patient reports 6/10 chest pain which was new and began sometime after the scan. It was centered over the upper left chest new midline. She had more chronic chest pain towards the right axilla. Asked to elaborate the patient did not provide additional descriptive information.       Past Medical History:   Diagnosis Date     History of blood transfusion      History of DVT (deep vein thrombosis)     related to port     Hydrocephalus (H)      Hypertension goal BP (blood pressure) < 140/90      Macular degeneration (senile) of retina 7/6/2017     Thyroid disease           Review Of Systems  Respiratory: No shortness of breath, dyspnea on exertion, cough, or hemoptysis and No shortness of breath    O: Physical Exam  Constitutional:       Appearance: Normal appearance.   HENT:      Head: Normocephalic and atraumatic.   Eyes:      Conjunctiva/sclera: Conjunctivae normal.   Cardiovascular:      Rate and Rhythm: Normal rate and regular rhythm.      Heart sounds: Normal heart sounds.   Pulmonary:      Breath sounds: Normal breath sounds.   Neurological:      General: No focal deficit present.      Mental Status: She is alert and oriented to person, place, and time.   Psychiatric:         Behavior: Behavior normal.          A:  Patient did not appear acutely at risk and symptoms did not coincide with contrast administration. Patient has had similar symptoms in the past.    P:  Patient was instructed that with continuing or worsening symptoms she should consider going to the ED. Patient was agreeable to this plan and stated that she would be going there once they left.

## 2021-06-07 NOTE — PROGRESS NOTES
Eunice arrived to [facility] from [facility].  She was complaining of chest pain 6/10.  VSS.  No issues breathing, patient denied dizziness.    Eunice states she has had intermittent chest pain and that this feeling is not unique for her.    Patient assessed by radiology MD. Recommendation is that Eunice goes to an emergency room.  Patient declines ambulance ride and is conversations with her boyfriend about whether to go to [facility] or [facility] ED.      RN walked with patient and boyfriend to [facility].

## 2021-06-08 ENCOUNTER — TELEPHONE (OUTPATIENT)
Dept: FAMILY MEDICINE | Facility: CLINIC | Age: 51
End: 2021-06-08

## 2021-06-08 ENCOUNTER — OFFICE VISIT (OUTPATIENT)
Dept: NEUROSURGERY | Facility: CLINIC | Age: 51
End: 2021-06-08
Payer: COMMERCIAL

## 2021-06-08 VITALS
WEIGHT: 263 LBS | RESPIRATION RATE: 16 BRPM | OXYGEN SATURATION: 95 % | HEIGHT: 63 IN | SYSTOLIC BLOOD PRESSURE: 148 MMHG | BODY MASS INDEX: 46.6 KG/M2 | DIASTOLIC BLOOD PRESSURE: 96 MMHG | HEART RATE: 104 BPM

## 2021-06-08 DIAGNOSIS — M75.01 ADHESIVE CAPSULITIS OF RIGHT SHOULDER: ICD-10-CM

## 2021-06-08 DIAGNOSIS — R03.0 ELEVATED BP WITHOUT DIAGNOSIS OF HYPERTENSION: Primary | ICD-10-CM

## 2021-06-08 DIAGNOSIS — M54.17 LUMBOSACRAL RADICULOPATHY: Primary | ICD-10-CM

## 2021-06-08 DIAGNOSIS — G89.29 CHRONIC LEFT-SIDED LOW BACK PAIN WITH RIGHT-SIDED SCIATICA: ICD-10-CM

## 2021-06-08 DIAGNOSIS — E66.01 MORBID OBESITY (H): ICD-10-CM

## 2021-06-08 DIAGNOSIS — M54.41 CHRONIC LEFT-SIDED LOW BACK PAIN WITH RIGHT-SIDED SCIATICA: ICD-10-CM

## 2021-06-08 PROCEDURE — 99214 OFFICE O/P EST MOD 30 MIN: CPT | Performed by: NEUROLOGICAL SURGERY

## 2021-06-08 ASSESSMENT — PAIN SCALES - GENERAL: PAINLEVEL: NO PAIN (0)

## 2021-06-08 ASSESSMENT — MIFFLIN-ST. JEOR: SCORE: 1786.96

## 2021-06-08 NOTE — TELEPHONE ENCOUNTER
Patient called the clinic inquiring about the results of the MRI completed 6/7/21 and provider's recommendations on next steps.    Patient continues to have symptoms including bleeding in stool on and off.  She states that the symptoms are getting worse.    Please call patient with provider's recommendations following the MRI results    Patient is inquiring if she should keep appointment with Neurosurgeon today.  Patient was advised to keep appointments.

## 2021-06-08 NOTE — PATIENT INSTRUCTIONS
PCP for HTN    Take blood pressure after 10 minutes of rest at [facility] or [facility] or [facility] a few times before PCP visit.    PT for frozen shoulder - pain with passive range of motion    Start gabapentin 100 mg TID     See Dr. Garrison in 2 weeks for epidural steroid injection    RTC in 4-6 weeks with me

## 2021-06-08 NOTE — LETTER
6/8/2021       RE: Eunice Barrientos  [address] [facility] [address] [place] [address]     Dear Colleague,    Thank you for referring your patient, Eunice Barrientos, to the [facility] NEUROSURGERY CLINIC [place] at [facility]. Please see a copy of my visit note below.    Neurosurgery Clinic Note    Reason for Visit: radiculopathy f/u    History of Present Illness  Eunice Barrientos is a 51 year old woman with a history of shunted IIH and Right-sided EMG negative radiculopathy (L5/S1) who returns after medication trial for her radiculopathy. She was not able to evaluate the effect of the medication yet, and she has had no change in her radicular symptoms. We have scheduled her for a visit with Dr. Garrison for consideration of an epidural steroid injection in 2 weeks.   In addition today she is complaining about shoulder pain that is limiting her ability to perform ADLs. She has been having shoulder pain for some time and it now has a limited range of motion. She has no radiating pain, and her pain is limited to any movement of her shoulder.    Her blood pressure is also high today, which she attributes to white coat syndrome. She was worried about the artifact seen on MRI of her lumbar spine in one of her adrenal glands that turned out to be confirmed artifact on subsequent imaging. An endocrine workup was performed, which was largely negative.    Otherwise she has had no other changes in her health.           Allergies   Allergen Reactions     Terazol [Terconazole] Hives       Current Outpatient Medications   Medication     gabapentin (NEURONTIN) 100 MG capsule     hydrochlorothiazide (HYDRODIURIL) 12.5 MG tablet     levothyroxine (SYNTHROID/LEVOTHROID) 125 MCG tablet     levothyroxine (SYNTHROID/LEVOTHROID) 137 MCG tablet     melatonin 3 MG tablet     naproxen (NAPROSYN) 500 MG tablet     nystatin (MYCOSTATIN) 219068 UNIT/GM external powder     Prenatal Vit-Fe Fumarate-FA  "(PRENATAL MULTIVITAMIN  PLUS IRON) 27-0.8 MG TABS per tablet     diazepam (VALIUM) 5 MG tablet     No current facility-administered medications for this visit.              Physical Exam  BP (!) 148/96   Pulse 104   Resp 16   Ht 1.6 m (5' 2.99\")   Wt 119.3 kg (263 lb)   SpO2 95%   BMI 46.60 kg/m        General: Awake, alert, oriented. Well nourished, well developed, no acute distress.  HEENT: Head normocephalic, atraumatic.  Heart: Regular rhythm and rate  Lungs: Clear to auscultation   Abdomen: Soft, non-tender, non-distended. No hepatosplenomegaly.  Extremity: Warm with no clubbing or cyanosis. No lower extremity edema.  ROM of Right shoulder significantly limited. Pain with PROM and AROM. No detectable weakness.    Neurological  Awake, alert and oriented to date, time, place and person. Speech fluent.   Pupils equal, round, reactive to light.  Extraocular movement intact.  Visual fields are full on confrontation.  Hearing is grossly normal to finger rub.   Facial sensation intact.  Face symmetric.  Tongue midline.  Uvula elevates equally.     Motor: full strength throughout.  Sensation: intact to light touch    ROS: 10 point ROS neg other than the symptoms noted above in the HPI.        Assessment and Plan   Eunice Barrientos is a 51 year old female with shunted IIH, lumbosacral radiculopathy s/p remote AP fusion at L5-S1 and likely frozen shoulder. We will have her see her PCP for blood pressure management, and in preparation we will have her take her blood pressure at [facility] to obtain some values after 10 min rest. We will also have her see her PCP for her frozen shoulder but I will order PT so she can get started with improving her ROM. I stressed the importance of addressing this promptly to prevent permanent disability. She is going to see Dr. Garrison in a couple of weeks. If the injection fails then we can consider SCS, which we discussed today.       1.PCP for blood pressure  Take blood pressure " after 10 minutes of rest [facility] times before PCP visit.    2. Frozen shoulder: PCP and PT for frozen shoulder - pain with passive range of motion    3. Start gabapentin 100 mg TID     4. See Dr. Garrison in 2 weeks    5. RTC in 4-6 weeks      Didier Jarvis MD  Department of Neurosurgery  [facility]        Again, thank you for allowing me to participate in the care of your patient.      Sincerely,    Didier Jarvis MD

## 2021-06-08 NOTE — TELEPHONE ENCOUNTER
"Per routing comment:    I saw her for a presumed adrenal lesion on CT scan. However...   \"1. No adrenal lesion is noted. Findings seen on recent lumbar spine   MRI corresponds to a gastric diverticulum on today's dedicated   abdominal MRI exam.\"     -no need to check aldosterone, renin, and cortisol as planned then.   -per blood in stools. Follow up with primary.     Zeke Robbins MD on 6/8/2021 at 11:32 AM           "

## 2021-06-08 NOTE — TELEPHONE ENCOUNTER
Called patient and discussed Dr. Robbins's message below. Patient verbalized understanding and has no questions.    Guille HUBER RN....6/8/2021 12:08 PM

## 2021-06-08 NOTE — TELEPHONE ENCOUNTER
Patient states b/p  Is running high and she wants to know if she can get an order for a b/p cuff at home. Her Neuro surgeon suggested this. Ok to leave a detailed message.

## 2021-06-08 NOTE — PROGRESS NOTES
"Neurosurgery Clinic Note    Reason for Visit: radiculopathy f/u    History of Present Illness  Eunice Barrientos is a 51 year old woman with a history of shunted IIH and Right-sided EMG negative radiculopathy (L5/S1) who returns after medication trial for her radiculopathy. She was not able to evaluate the effect of the medication yet, and she has had no change in her radicular symptoms. We have scheduled her for a visit with Dr. Garrison for consideration of an epidural steroid injection in 2 weeks.   In addition today she is complaining about shoulder pain that is limiting her ability to perform ADLs. She has been having shoulder pain for some time and it now has a limited range of motion. She has no radiating pain, and her pain is limited to any movement of her shoulder.    Her blood pressure is also high today, which she attributes to white coat syndrome. She was worried about the artifact seen on MRI of her lumbar spine in one of her adrenal glands that turned out to be confirmed artifact on subsequent imaging. An endocrine workup was performed, which was largely negative.    Otherwise she has had no other changes in her health.           Allergies   Allergen Reactions     Terazol [Terconazole] Hives       Current Outpatient Medications   Medication     gabapentin (NEURONTIN) 100 MG capsule     hydrochlorothiazide (HYDRODIURIL) 12.5 MG tablet     levothyroxine (SYNTHROID/LEVOTHROID) 125 MCG tablet     levothyroxine (SYNTHROID/LEVOTHROID) 137 MCG tablet     melatonin 3 MG tablet     naproxen (NAPROSYN) 500 MG tablet     nystatin (MYCOSTATIN) 966237 UNIT/GM external powder     Prenatal Vit-Fe Fumarate-FA (PRENATAL MULTIVITAMIN  PLUS IRON) 27-0.8 MG TABS per tablet     diazepam (VALIUM) 5 MG tablet     No current facility-administered medications for this visit.              Physical Exam  BP (!) 148/96   Pulse 104   Resp 16   Ht 1.6 m (5' 2.99\")   Wt 119.3 kg (263 lb)   SpO2 95%   BMI 46.60 kg/m        General: " Awake, alert, oriented. Well nourished, well developed, no acute distress.  HEENT: Head normocephalic, atraumatic.  Heart: Regular rhythm and rate  Lungs: Clear to auscultation   Abdomen: Soft, non-tender, non-distended. No hepatosplenomegaly.  Extremity: Warm with no clubbing or cyanosis. No lower extremity edema.  ROM of Right shoulder significantly limited. Pain with PROM and AROM. No detectable weakness.    Neurological  Awake, alert and oriented to date, time, place and person. Speech fluent.   Pupils equal, round, reactive to light.  Extraocular movement intact.  Visual fields are full on confrontation.  Hearing is grossly normal to finger rub.   Facial sensation intact.  Face symmetric.  Tongue midline.  Uvula elevates equally.     Motor: full strength throughout.  Sensation: intact to light touch    ROS: 10 point ROS neg other than the symptoms noted above in the HPI.        Assessment and Plan   Eunice Barrientos is a 51 year old female with shunted IIH, lumbosacral radiculopathy s/p remote AP fusion at L5-S1 and likely frozen shoulder. We will have her see her PCP for blood pressure management, and in preparation we will have her take her blood pressure at [facility] to obtain some values after 10 min rest. We will also have her see her PCP for her frozen shoulder but I will order PT so she can get started with improving her ROM. I stressed the importance of addressing this promptly to prevent permanent disability. She is going to see Dr. Garrison in a couple of weeks. If the injection fails then we can consider SCS, which we discussed today.       1.PCP for blood pressure  Take blood pressure after 10 minutes of rest at [facility] or [facility] or [facility] a few times before PCP visit.    2. Frozen shoulder: PCP and PT for frozen shoulder - pain with passive range of motion    3. Start gabapentin 100 mg TID     4. See Dr. Garrison in 2 weeks    5. RTC in 4-6 weeks      Didier Jarvis MD  Department of  Neurosurgery  [facility]

## 2021-06-09 ENCOUNTER — NURSE TRIAGE (OUTPATIENT)
Dept: FAMILY MEDICINE | Facility: CLINIC | Age: 51
End: 2021-06-09

## 2021-06-09 RX ORDER — ADHESIVE BANDAGE 3/4"
BANDAGE TOPICAL
Qty: 1 EACH | Refills: 0 | Status: SHIPPED | OUTPATIENT
Start: 2021-06-09

## 2021-06-09 NOTE — TELEPHONE ENCOUNTER
Patient calling has to go  other medication at the [facility] on [facility] would like to  the BP cuff to please advise she has a ride today.  Devora Landaverde,

## 2021-06-09 NOTE — TELEPHONE ENCOUNTER
Ok to add her on at 12:00 tomorrow (Thursday) for face to face.    Unless having severe headache or chest pain, fine to wait until tomorrow to be seen in clinic for BP.    Kiara Quintana, CNP

## 2021-06-09 NOTE — TELEPHONE ENCOUNTER
"Patient called and is wondering if she can get     Reason for Disposition    Patient wants to be seen    Additional Information    Negative: Sounds like a life-threatening emergency to the triager    Negative: Pregnant > 20 weeks or postpartum (< 6 weeks after delivery) and new hand or face swelling    Negative: Pregnant > 20 weeks and BP > 140/90    Negative: Systolic BP >= 160 OR Diastolic >= 100, and any cardiac or neurologic symptoms (e.g., chest pain, difficulty breathing, unsteady gait, blurred vision)    Negative: Patient sounds very sick or weak to the triager    Negative: BP Systolic BP >= 140 OR Diastolic >= 90 and postpartum (from 0 to 6 weeks after delivery)    Negative: Systolic BP >= 180 OR Diastolic >= 110, and missed most recent dose of blood pressure medication    Negative: Systolic BP >= 180 OR Diastolic >= 110    Answer Assessment - Initial Assessment Questions  1. BLOOD PRESSURE: \"What is the blood pressure?\" \"Did you take at least two measurements 5 minutes apart?\"      I have not been able to take it today.  Yesterday at the Neurosurgeons office it was 181/121, hen after a few minutes it was 148/96.  I was also in the ER on 6/7/21 and they advised me it was high.  (see ER notes in epic)  2. ONSET: \"When did you take your blood pressure?\"      See above  3. HOW: \"How did you obtain the blood pressure?\" (e.g., visiting nurse, automatic home BP monitor)      See above  4. HISTORY: \"Do you have a history of high blood pressure?\"      yes  5. MEDICATIONS: \"Are you taking any medications for blood pressure?\" \"Have you missed any doses recently?\"      HCTZ  6. OTHER SYMPTOMS: \"Do you have any symptoms?\" (e.g., headache, chest pain, blurred vision, difficulty breathing, weakness)      When I went to the ER I was having floaters in my eye, chest pain, headache and shortness of breath.  Today I still have a headaches and some floaters    Protocols used: HIGH BLOOD PRESSURE-A-[place]      "

## 2021-06-09 NOTE — TELEPHONE ENCOUNTER
Routing back to Kiara Quintana with update from pt    Pt does not have a car and relies on boyfriend for transportation. He has an appointment in the late morning himself and she is worried she will not be able to make it to the clinic by 12 pm tomorrow.     Pt asking if she can come around 1 pm or as soon as she is able between 12-1 pm.    Stephanie BROWN RN, BSN  [facility]

## 2021-06-09 NOTE — TELEPHONE ENCOUNTER
Patient is calling with an elevated BP        She has not been able to take it today.  Yesterday at the Neurosurgeons office it was 181/121,then after a few minutes it was 148/96.  She was also in the ER on 6/7/21 and they advised her it was high.  (see ER notes in epic)          When shenwent to the ER om 6/7/21, she was having floaters in her eye, chest pain, headache, and shortness of breath.  Today she still has a headache and some floaters.  She is also feeling slightly dizzy.    She is on her way to  BP machine from pharmacy.  Advised if BP is still elevated she should go to ER as she is symptomatic.    Patient was supposed to see PCP 1-2 days after ER visit.  There are only virtual visits open, and not until Friday.  She would like to see PCP tomorrow if possible.    Also, routed to PCP to advise on BP issue.    Irene HOFFMANN-RN  Triage Nurse  [facility]: [facility]

## 2021-06-10 NOTE — TELEPHONE ENCOUNTER
Pt called stating that [facility] would not fill request for BP machine. Asking if it could be resent to [facility] at fx: [phone]. If more information is needed pt states that we could call back.  Maria Guadalupe Cole-  [place]

## 2021-06-11 NOTE — TELEPHONE ENCOUNTER
Rx was confirmed faxed to [facility]. Called Eunice and informed that it was faxed to them.  Maria Guadalupe Cole-  [place]

## 2021-06-11 NOTE — TELEPHONE ENCOUNTER
Reason for Call:  Other prescription    Detailed comments: Pt returning phone call to follow up on medication request and would like to see if her PCP can re-fax Rx to [facility] as soon as possible today so Pt can have BP Machine over the weekend. She would like a call back to confirm Rx has been faxed.    Phone Number Patient can be reached at: Home number on file [phone] (home)    Best Time: anytime    Can we leave a detailed message on this number? YES    Call taken on 6/11/2021 at 9:59 AM by Toni Roberto

## 2021-06-15 ENCOUNTER — TELEPHONE (OUTPATIENT)
Dept: FAMILY MEDICINE | Facility: CLINIC | Age: 51
End: 2021-06-15

## 2021-06-15 NOTE — TELEPHONE ENCOUNTER
Spoke with pt. She was notified that she had a lesion on adrenal gland, has had issues with HTN, irritability, kidney/urine tests were abnormal. States she was notified yesterday that she had lead exposure in her water for the last 8 months.  Not sure if her health issues could be related to this. Had an inspection done on her apartment. Pt is wondering what side effects are with lead in the water? What are signs of lead poisoning? Anything to do prior to appt on Thursday? Please advise.    Elvira Ray RN  [facility]

## 2021-06-16 NOTE — TELEPHONE ENCOUNTER
Pt was given provider's message as written. She requested information be sent via [employer]. This was sent to pt.    Elvira Ray RN  [facility]

## 2021-06-16 NOTE — TELEPHONE ENCOUNTER
We can do a lead test at her upcoming appointment. Irritability can be attributed to lead poisoning, but not her other symptoms.     This link has tips to reduce lead in drinking water- can send on [facility] or print and mail to patient:    https://www.epa.gov/sites/production/files/2017-08/documents/epa_lead_in_drinking_water_final_8.21.17.pdf    Kiara Quintana, CNP

## 2021-06-17 ENCOUNTER — ANCILLARY PROCEDURE (OUTPATIENT)
Dept: GENERAL RADIOLOGY | Facility: CLINIC | Age: 51
End: 2021-06-17
Attending: NURSE PRACTITIONER
Payer: COMMERCIAL

## 2021-06-17 ENCOUNTER — OFFICE VISIT (OUTPATIENT)
Dept: FAMILY MEDICINE | Facility: CLINIC | Age: 51
End: 2021-06-17
Payer: COMMERCIAL

## 2021-06-17 VITALS
TEMPERATURE: 98.1 F | HEART RATE: 112 BPM | WEIGHT: 269 LBS | BODY MASS INDEX: 47.66 KG/M2 | OXYGEN SATURATION: 95 % | DIASTOLIC BLOOD PRESSURE: 84 MMHG | SYSTOLIC BLOOD PRESSURE: 142 MMHG

## 2021-06-17 DIAGNOSIS — Z13.88 SCREENING FOR LEAD EXPOSURE: ICD-10-CM

## 2021-06-17 DIAGNOSIS — G89.29 CHRONIC RIGHT SHOULDER PAIN: ICD-10-CM

## 2021-06-17 DIAGNOSIS — R55 SYNCOPE, UNSPECIFIED SYNCOPE TYPE: ICD-10-CM

## 2021-06-17 DIAGNOSIS — R82.90 FOUL SMELLING URINE: ICD-10-CM

## 2021-06-17 DIAGNOSIS — M25.511 CHRONIC RIGHT SHOULDER PAIN: ICD-10-CM

## 2021-06-17 DIAGNOSIS — R19.7 BLOODY DIARRHEA: ICD-10-CM

## 2021-06-17 DIAGNOSIS — I10 HYPERTENSION GOAL BP (BLOOD PRESSURE) < 140/90: Primary | ICD-10-CM

## 2021-06-17 LAB
ALBUMIN SERPL-MCNC: 4 G/DL (ref 3.4–5)
ALBUMIN UR-MCNC: NEGATIVE MG/DL
ALP SERPL-CCNC: 74 U/L (ref 40–150)
ALT SERPL W P-5'-P-CCNC: 30 U/L (ref 0–50)
APPEARANCE UR: CLEAR
AST SERPL W P-5'-P-CCNC: 18 U/L (ref 0–45)
BILIRUB DIRECT SERPL-MCNC: <0.1 MG/DL (ref 0–0.2)
BILIRUB SERPL-MCNC: 0.3 MG/DL (ref 0.2–1.3)
BILIRUB UR QL STRIP: NEGATIVE
COLOR UR AUTO: YELLOW
CRP SERPL-MCNC: 4.9 MG/L (ref 0–8)
ERYTHROCYTE [SEDIMENTATION RATE] IN BLOOD BY WESTERGREN METHOD: 13 MM/H (ref 0–20)
GLUCOSE UR STRIP-MCNC: NEGATIVE MG/DL
HGB UR QL STRIP: NEGATIVE
KETONES UR STRIP-MCNC: NEGATIVE MG/DL
LEUKOCYTE ESTERASE UR QL STRIP: NEGATIVE
NITRATE UR QL: NEGATIVE
PH UR STRIP: 5.5 PH (ref 5–7)
PROT SERPL-MCNC: 7.5 G/DL (ref 6.8–8.8)
SOURCE: NORMAL
SP GR UR STRIP: >1.03 (ref 1–1.03)
SPECIMEN SOURCE: NORMAL
UROBILINOGEN UR STRIP-ACNC: 0.2 EU/DL (ref 0.2–1)
WET PREP SPEC: NORMAL

## 2021-06-17 PROCEDURE — 83516 IMMUNOASSAY NONANTIBODY: CPT | Mod: 59 | Performed by: NURSE PRACTITIONER

## 2021-06-17 PROCEDURE — 73030 X-RAY EXAM OF SHOULDER: CPT | Mod: RT | Performed by: RADIOLOGY

## 2021-06-17 PROCEDURE — 83516 IMMUNOASSAY NONANTIBODY: CPT | Performed by: NURSE PRACTITIONER

## 2021-06-17 PROCEDURE — 36415 COLL VENOUS BLD VENIPUNCTURE: CPT | Performed by: NURSE PRACTITIONER

## 2021-06-17 PROCEDURE — 99000 SPECIMEN HANDLING OFFICE-LAB: CPT | Performed by: NURSE PRACTITIONER

## 2021-06-17 PROCEDURE — 81003 URINALYSIS AUTO W/O SCOPE: CPT | Performed by: NURSE PRACTITIONER

## 2021-06-17 PROCEDURE — 87210 SMEAR WET MOUNT SALINE/INK: CPT | Performed by: NURSE PRACTITIONER

## 2021-06-17 PROCEDURE — 99215 OFFICE O/P EST HI 40 MIN: CPT | Performed by: NURSE PRACTITIONER

## 2021-06-17 PROCEDURE — 85652 RBC SED RATE AUTOMATED: CPT | Performed by: NURSE PRACTITIONER

## 2021-06-17 PROCEDURE — 83655 ASSAY OF LEAD: CPT | Mod: 90 | Performed by: NURSE PRACTITIONER

## 2021-06-17 PROCEDURE — 80076 HEPATIC FUNCTION PANEL: CPT | Performed by: NURSE PRACTITIONER

## 2021-06-17 PROCEDURE — 86140 C-REACTIVE PROTEIN: CPT | Performed by: NURSE PRACTITIONER

## 2021-06-17 RX ORDER — HYDROCHLOROTHIAZIDE 25 MG/1
25 TABLET ORAL DAILY
Qty: 90 TABLET | Refills: 0 | Status: SHIPPED | OUTPATIENT
Start: 2021-06-17

## 2021-06-17 NOTE — PROGRESS NOTES
Assessment & Plan     Hypertension goal BP (blood pressure) < 140/90  Uncontrolled, increase hydrochlorothiazide and follow up in 1 month  - hydrochlorothiazide (HYDRODIURIL) 25 MG tablet; Take 1 tablet (25 mg) by mouth daily    Syncope, unspecified syncope type  No cause found in Emergency Room work-up and has been seeing Neurosurgery, shunt functioning. No apparent cause for this isolated event.    Chronic right shoulder pain  Concerning for rotator cuff injury and frozen shoulder- has been referred for Physical Therapy, recommend consult with Ortho for further evaluation  - Orthopedic  Referral; Future  - XR Shoulder Right G/E 3 Views; Future    Foul smelling urine    - *UA reflex to Microscopic and Culture ([place] and [facility] (except [place] and [place])  - Wet prep    Screening for lead exposure  Reports household exposure  - Lead Venous Blood Confirm    Bloody diarrhea  Recommend stool cultures and if these are negative, needs colonoscopy due to bloody in stools  - Enteric Bacteria and Virus Panel by DON Stool; Future  - GASTROENTEROLOGY ADULT REF PROCEDURE ONLY; Future  - ESR: Erythrocyte sedimentation rate  - CRP, inflammation  - Tissue transglutaminase brandy IgA and IgG  - Hepatic panel (Albumin, ALT, AST, Bili, Alk Phos, TP)    Review of prior external note(s) from - [facility] from [facility] reviewed  Outside records from Neurosurgery, Endocrinology  Review of the result(s) of each unique test - CBC, CMP, UA, wet prep, ESR, US upper extremity, XR Shunt, CT head, Lumbar MRI, Renal MRI  Ordering of each unique test  Prescription drug management  45 minutes spent on the date of the encounter doing chart review, history and exam, documentation and further activities per the note       See Patient Instructions    Return in about 1 month (around 7/17/2021) for Recheck of today's visit.    ABIOLA Burgess CNP  [facility]    Subjective    Eunice is a 51 year old who presents for the following health issues     HPI     ED/UC Followup:    Facility:   Emergency Department  Date of visit: 6/16/2021  Reason for visit: fell on the floor at ED  Current Status: Pt would like to discuss lab work. Still having a headache but a lot better then yesterday.         Hypertension Follow-up      Do you check your blood pressure regularly outside of the clinic? Yes     Are you following a low salt diet? Yes    Are your blood pressures ever more than 140 on the top number (systolic) OR more   than 90 on the bottom number (diastolic), for example 140/90? Yes      How many servings of fruits and vegetables do you eat daily?  2-3    On average, how many sweetened beverages do you drink each day (Examples: soda, juice, sweet tea, etc.  Do NOT count diet or artificially sweetened beverages)?   0    How many days per week do you exercise enough to make your heart beat faster? 3 or less    How many minutes a day do you exercise enough to make your heart beat faster? 30 - 60    How many days per week do you miss taking your medication? 0      -pinched nerve in back & MRI for adrenal mass  -abnormal urine results- Saw Endo for this  -MRI of adrenal did not show mass- follow up labs not recommended  -D dimer was high in ER but CT negative for PE    -Checking home BPs and elevated. Had high BPs during pregnancy.    -Urine smells foul    -Yesterday was in Emergency Room and had syncopal episode- had some dizziness, lightheadedness prior to this. No prior syncopal episodes.    -Weight gain, irritability/moodiness, waking up from leg pain- concerned about this being related to the Gabapentin.    -Has been having pain in chest since September, painful to use right arm. Was seen in Emergency Room 6/7 for this- negative venous US right upper extremity. Pain intermittent, can't sleep on right side, hard to use right arm. Can't use elbow to do hair. No fall or shoulder injury. Echo  done 10/2020 for this.    -Severe pain in abdomen after eating and diarrhea in stool, bright red bleeding in stools. Ongoing for months. Has hemorrhoids but this is different- blood seems to be intermixed in stool.    Review of Systems   Constitutional, HEENT, cardiovascular, pulmonary, GI, , musculoskeletal, neuro, skin, endocrine and psych systems are negative, except as otherwise noted.      Objective    BP (!) 142/84 (BP Location: Right arm, Patient Position: Chair, Cuff Size: Adult Large)   Pulse 112   Temp 98.1  F (36.7  C) (Oral)   Wt 122 kg (269 lb)   SpO2 95%   Breastfeeding No   BMI 47.66 kg/m    Body mass index is 47.66 kg/m .  Physical Exam   GENERAL: healthy, alert and no distress  RESP: lungs clear to auscultation - no rales, rhonchi or wheezes  CV: regular rate and rhythm, normal S1 S2, no S3 or S4, no murmur, click or rub, no peripheral edema and peripheral pulses strong  ABDOMEN: soft, nontender, no hepatosplenomegaly, no masses and bowel sounds normal  MS: Right shoulder tender at rotator cuff, biceps tendon. Passive and active ROM with limited flexion, extension, external and internal rotation. Mildly positive Hawkin's, mildly positive empty can. ROM of shoulder reproduces her right chest pain  RECTAL (female): normal sphincter tone, no rectal masses and external hemorrhoids- no marked enlargement or bleeding    No results found for this or any previous visit (from the past 24 hour(s)).

## 2021-06-18 LAB
TTG IGA SER-ACNC: <1 U/ML
TTG IGG SER-ACNC: 2 U/ML

## 2021-06-20 LAB — LEAD BLDV-MCNC: <2 UG/DL

## 2021-06-21 ENCOUNTER — OFFICE VISIT (OUTPATIENT)
Dept: ANESTHESIOLOGY | Facility: CLINIC | Age: 51
End: 2021-06-21
Payer: COMMERCIAL

## 2021-06-21 VITALS
SYSTOLIC BLOOD PRESSURE: 129 MMHG | HEIGHT: 63 IN | BODY MASS INDEX: 47.66 KG/M2 | DIASTOLIC BLOOD PRESSURE: 91 MMHG | HEART RATE: 69 BPM | RESPIRATION RATE: 16 BRPM | WEIGHT: 269 LBS

## 2021-06-21 DIAGNOSIS — M54.17 LUMBOSACRAL RADICULOPATHY: ICD-10-CM

## 2021-06-21 PROCEDURE — 99204 OFFICE O/P NEW MOD 45 MIN: CPT | Mod: GC | Performed by: ANESTHESIOLOGY

## 2021-06-21 ASSESSMENT — PAIN SCALES - GENERAL: PAINLEVEL: SEVERE PAIN (6)

## 2021-06-21 ASSESSMENT — MIFFLIN-ST. JEOR: SCORE: 1814.15

## 2021-06-21 NOTE — PROGRESS NOTES
Pain Clinic New Patient Consult Note:    Referring Provider: Matheus   Primary care provider: Kiara Quintana.    Eunice Barrientos is a 51 year old y.o. old female who presents to the pain clinic with Right leg pain.  Her past medical history is significant for DVT, hypertension, thyroid disease and pseudotumor cerebri complicated with hydrocephalus 7:02 PM status post  shunt.  The patient was referred by Dr. Jarvis for epidural steroid injection consideration.  Of note, the patient has a history of a posterior and interbody fusion at L5-S1 with laminectomy..    HPI:    Today, the patient reports pain in the low back with radiation primarily to the right leg for the past 1 to 2 years.  The pain travels posteriorly and stops at the mid calf region which she describes it as a heavy shooting pain.  The pain as become quite bothersome that it awakens her up at night.  She was previously working as a  and had stopped working due to the pain.  There is some pain in the left leg as well as well, however minimal.  The pain seems to be relieved with rest and is aggravated with ambulation or standing upright for long periods of time.  She reports that the pain has impacted her daily function significantly, but she is able to perform activities of daily living, at a slower pace.    She did have an EMG performed on 1/13/2021, however the study was unremarkable.    There is no bowel or bladder changes from baseline and no recent falls or injuries.  There are no progressive weakness.  She denies any sensory changes.     Pain treatments:    Herbal medicines: no  Physical therapy: no  Chiropractor: no  Pain physician: no  Surgery: 2013, posterior and interbody fusion at L5-S1.  Biofeedback:no   Acupuncture: no    Tests/Imaging reviewed with the patient:  Yes, Lumbar MRI  From 5/11/2021    Significant Medical History:   Past Medical History:   Diagnosis Date     History of blood transfusion      History of DVT (deep  vein thrombosis)     related to port     Hydrocephalus (H)      Hypertension goal BP (blood pressure) < 140/90      Macular degeneration (senile) of retina 7/6/2017     Thyroid disease        Past Surgical History:  Past Surgical History:   Procedure Laterality Date     CHOLECYSTECTOMY       COLPOSCOPY CERVIX, BIOPSY CERVIX, ENDOCERVICAL CURETTAGE, COMBINED  2009    several, earliest was 1992     FUSION LUMBAR ANTERIOR ONE LEVEL  2013     intracranial shunt       LEEP TX, CERVICAL  1995     MASTOIDECTOMY  2003     OOPHORECTOMY Left        Family History:  Family History   Problem Relation Age of Onset     Diabetes Maternal Grandmother      Hypertension Maternal Grandmother      Cancer Maternal Aunt      Cerebrovascular Disease No family hx of      Thyroid Disease No family hx of      Glaucoma No family hx of      Macular Degeneration No family hx of        Social History:  Social History     Socioeconomic History     Marital status:      Spouse name: Not on file     Number of children: Not on file     Years of education: Not on file     Highest education level: Not on file   Occupational History     Not on file   Social Needs     Financial resource strain: Not on file     Food insecurity     Worry: Not on file     Inability: Not on file     Transportation needs     Medical: Not on file     Non-medical: Not on file   Tobacco Use     Smoking status: Never Smoker     Smokeless tobacco: Never Used   Substance and Sexual Activity     Alcohol use: No     Drug use: No     Sexual activity: Not Currently     Partners: Male   Lifestyle     Physical activity     Days per week: Not on file     Minutes per session: Not on file     Stress: Not on file   Relationships     Social connections     Talks on phone: Not on file     Gets together: Not on file     Attends Gnosticism service: Not on file     Active member of club or organization: Not on file     Attends meetings of clubs or organizations: Not on file      Relationship status: Not on file     Intimate partner violence     Fear of current or ex partner: Not on file     Emotionally abused: Not on file     Physically abused: Not on file     Forced sexual activity: Not on file   Other Topics Concern     Parent/sibling w/ CABG, MI or angioplasty before 65F 55M? Not Asked   Social History Narrative     Not on file     Social History     Social History Narrative     Not on file     Allergies:  Allergies   Allergen Reactions     Terazol [Terconazole] Hives       Current Medications:   Current Outpatient Medications   Medication Sig Dispense Refill     Blood Pressure Monitoring (BLOOD PRESSURE CUFF) MISC Use as needed to check blood pressure 1 each 0     hydrochlorothiazide (HYDRODIURIL) 25 MG tablet Take 1 tablet (25 mg) by mouth daily 90 tablet 0     levothyroxine (SYNTHROID/LEVOTHROID) 125 MCG tablet TAKE 1 TABLET BY MOUTH EVERY OTHER DAY ALTERNATING  WITH  137  MCG  TABLETS 45 tablet 2     levothyroxine (SYNTHROID/LEVOTHROID) 137 MCG tablet TAKE 1 TABLET BY MOUTH EVERY OTHER DAY ALTERNATING  WITH  125MCG  TABLETS 45 tablet 2     melatonin 3 MG tablet Take 1 tablet (3 mg) by mouth nightly as needed for sleep 90 tablet 1     naproxen (NAPROSYN) 500 MG tablet TAKE 1 TABLET BY MOUTH TWICE DAILY WITH MEALS 30 tablet 0     nystatin (MYCOSTATIN) 703639 UNIT/GM external powder Apply to affected area twice daily as needed 120 g 11     Prenatal Vit-Fe Fumarate-FA (PRENATAL MULTIVITAMIN  PLUS IRON) 27-0.8 MG TABS per tablet Take 1 tablet by mouth daily       diazepam (VALIUM) 5 MG tablet Take 1 tablet (5 mg) by mouth once as needed for anxiety (before MRI) (Patient not taking: Reported on 6/8/2021) 5 tablet 0     gabapentin (NEURONTIN) 100 MG capsule Take 1 capsule (100 mg) by mouth 3 times daily 90 capsule 0       Current Pain Medications:  Medications related to Pain Management (From now, onward)    None           Past Pain Medications:  Naproxen  Hydrocodone  Diazepam    Blood  "thinner:  none    Work History: Works as a     Current work status: Currently on disability    Psychosocial History:     History of treatment for behavioral disorder: No  History of suicidal ideation or suicidal attempt: No    Review of Systems:  Per HPI    Physical Exam:     Vitals:    06/21/21 1242   BP: (!) 129/91   Pulse: 69   Resp: 16   Weight: 122 kg (269 lb)   Height: 1.6 m (5' 2.99\")       General Appearance: No distress, seated comfortably  Mood: Euthymic  HE ENT: Non constricted pupils  Respiratory: Non labored breathing  Skin: No rashes over exposed skin  MS: Moving all extremities equally  Neuro: Alert oriented  Gait: None antalgic, ambulates without assistance    Pain specific exam:  Strength 5 out of 5 all muscle groups except for right plantar flexion and EHL 4/5  Reflexes 2+  Sensation intact to light touch  Straight leg raise negative, slump test negative  Sacroiliac joint posterior exams negative  No tenderness to greater trochanter bursa    Laboratory results:  Recent Labs   Lab Test 05/19/21  0932 03/05/21  1533 10/12/20  1927   NA  --  136 139   POTASSIUM 4.4 4.3 3.9   CHLORIDE  --  107 106   CO2  --  23 27   ANIONGAP  --  6 6   GLC  --  91 84   BUN  --  16 15   CR  --  0.89 0.77   DAKOTA  --  9.1 9.0       CBC RESULTS:   Recent Labs   Lab Test 03/05/21  1533   WBC 7.5   RBC 4.26   HGB 13.7   HCT 42.2   MCV 99   MCH 32.2   MCHC 32.5   RDW 12.9          Imaging:   Lumbar MRI 5/11/2021                                               Impression:  1. Postsurgical changes of posterior and interbody fusion at L5-S1.  Residual trace anterolisthesis. Residual tiny disc bulge asymmetric to  the right with abutment of right exiting L5. No obvious neural  impingement or spinal canal stenosis.  2. Incompletely characterized possible lesion in the left adrenal  gland, seen in the superior most slice axial T2-weighted images. This  can be artifact versus true lesion. Recommend dedicated upper " abdomen  MRI for adrenal gland lesions.    ASSESSMENT AND PLAN:     Encounter Diagnosis:  (M54.17) Lumbosacral radiculopathy    Eunice Barrientos is a 51 year old y.o. old female who presents to the pain clinic with lumbar radiculopathy in the right L5-S1 distribution.    Today the patient presents with right leg pain which originates in the low back and radiates down the posterior leg stopping at mid calf region.  The patient has been having this pain for the last 1 to 2 years and has affected her quality of life.  She had to take time off of work due to the pain.  And has noticed some occasional weakness by the end of the day.  History is reassuring without any evidence of bowel or bladder incontinence or progressive weakness.  On physical exam she does have decreased sensation to plantar flexion and EHL which can be contributed to L5 and S1 nerve roots on the right side.  MRI obtained on 5/11/2021 did show right L5 nerve root abutment, which is likely contributing to current pain.  She does have a history of a previous L5-S1 posterior interbody fusion with laminectomy, and thus we would be unable to perform an interlaminar steroid injection.  We could consider a S1 transforaminal injection however it appears that both L5 and S1 nerve roots might be affected and thus the patient would benefit more from a caudal epidural steroid injection, as the patient has occasional pain on the left side as well.    I have summarized the patient s past medical history, discussed their clinical findings and the potential differential diagnosis with the patient. Significant past medical history pertinent to the patient s current condition includes previous lumbar fusion.  The clinical findings reveal L5-S1 nerve root irritation. The differential diagnosis discussed with the patient are listed above. I have discussed anatomy and possible sources of the pain using models and/or pictures (diagrams). I have discussed multi- disciplinary  pain management options withthe patient as pertaining to their case as detailed above. The pain management options we discussed included, but were not limited to the recommendations below.  I also discussed with patient the risks, benefits and alternatives to each pain management option.  All of the patient s questions and concerns were answered to the best of my ability.    RECOMMENDATIONS:     1. Medications: Continue current neuropathic gabapentin medication as prescribed    2. Procedure: Given previous posterior interbody fusion at L5-S1, patient is not a candidate for an interlaminar steroid injection at that level.  We will plan for a caudal epidural steroid injection    I also discussed with the patient that the possible risks involved with interventional treatment included, but are not limited to, no pain control, worsened pain, stroke,seizure, spinal headache, allergic reactions, introduction of infection or bleeding which may lead to emergent spine surgery, nerve damage, paralysis oreven death.    3. Physical therapy: I have refered the patient for outpatient physical therapy for stretching, strengthening and home exercise program for lumbosacral radiculopathy. The patient will also discuss spine care and posture. Pool therapy and stretches can be considered if available.    4.  Most recent MRI on 5/20/2021.  No imaging ordered today    Follow up: 3 to 4 weeks following the steroid injection    Patient was seen with Dr. Garrison.    ATTENDING ATTESTATION  I saw the patient along with the pain medicine fellow Dr. Bartolo Salcido. Please see his note above for full details. I have edited his note and agree with it. I was involved in gathering history, physical examination and development of the plan of care.

## 2021-06-21 NOTE — Clinical Note
6/21/2021       RE: Eunice Barrientos  [address] [facility] [address] [place] [address]     Dear Colleague,    Thank you for referring your patient, Eunice Barrientos, to the [facility]IN[facility] FOR COMPREHENSIVE PAIN MANAGEMENT [place] at [facility]. Please see a copy of my visit note below.      Pain Clinic New Patient Consult Note:    Referring Provider: Matheus   Primary care provider: Kiara Quintana.    Eunice Barrientos is a 51 year old y.o. old female who presents to the pain clinic with Right leg pain.  Her past medical history is significant for DVT, hypertension, thyroid disease and pseudotumor cerebri complicated with hydrocephalus 7:02 PM status post  shunt.  The patient was referred by Dr. Jarvis for epidural steroid injection consideration.  Of note, the patient has a history of a posterior and interbody fusion at L5-S1 with laminectomy..    HPI:    Today, the patient reports pain in the low back with radiation primarily to the right leg for the past 1 to 2 years.  The pain travels posteriorly and stops at the mid calf region which she describes it as a heavy shooting pain.  The pain is become so bothersome the awakens her up at night.  She was previously working as a  and had stopped working due to the pain.  There is some pain in the left side as well.  The pain seems to be relieved with rest and is aggravated with ambulation or standing upright for long periods of time.  She reports that the pain has impacted her daily function significantly, however is able to progress to daily living but at a slower pace.    She did have an EMG performed on 1/13/2021, however the study was unremarkable.    There is no bowel or bladder changes from baseline and no recent falls or injuries.  There are no progressive weakness.  She denies any sensory changes.     Pain treatments:    Herbal medicines: no  Physical therapy: no  Chiropractor: no  Pain physician:  no  Surgery: 2013, posterior and interbody fusion at L5-S1.  Biofeedback:no   Acupuncture: no    Tests/Imaging reviewed with the patient:  Yes, Lumbar MRI  From 5/11/2021    Significant Medical History:   Past Medical History:   Diagnosis Date     History of blood transfusion      History of DVT (deep vein thrombosis)     related to port     Hydrocephalus (H)      Hypertension goal BP (blood pressure) < 140/90      Macular degeneration (senile) of retina 7/6/2017     Thyroid disease        Past Surgical History:  Past Surgical History:   Procedure Laterality Date     CHOLECYSTECTOMY       COLPOSCOPY CERVIX, BIOPSY CERVIX, ENDOCERVICAL CURETTAGE, COMBINED  2009    several, earliest was 1992     FUSION LUMBAR ANTERIOR ONE LEVEL  2013     intracranial shunt       LEEP TX, CERVICAL  1995     MASTOIDECTOMY  2003     OOPHORECTOMY Left        Family History:  Family History   Problem Relation Age of Onset     Diabetes Maternal Grandmother      Hypertension Maternal Grandmother      Cancer Maternal Aunt      Cerebrovascular Disease No family hx of      Thyroid Disease No family hx of      Glaucoma No family hx of      Macular Degeneration No family hx of        Social History:  Social History     Socioeconomic History     Marital status:      Spouse name: Not on file     Number of children: Not on file     Years of education: Not on file     Highest education level: Not on file   Occupational History     Not on file   Social Needs     Financial resource strain: Not on file     Food insecurity     Worry: Not on file     Inability: Not on file     Transportation needs     Medical: Not on file     Non-medical: Not on file   Tobacco Use     Smoking status: Never Smoker     Smokeless tobacco: Never Used   Substance and Sexual Activity     Alcohol use: No     Drug use: No     Sexual activity: Not Currently     Partners: Male   Lifestyle     Physical activity     Days per week: Not on file     Minutes per session: Not on  file     Stress: Not on file   Relationships     Social connections     Talks on phone: Not on file     Gets together: Not on file     Attends Hindu service: Not on file     Active member of club or organization: Not on file     Attends meetings of clubs or organizations: Not on file     Relationship status: Not on file     Intimate partner violence     Fear of current or ex partner: Not on file     Emotionally abused: Not on file     Physically abused: Not on file     Forced sexual activity: Not on file   Other Topics Concern     Parent/sibling w/ CABG, MI or angioplasty before 65F 55M? Not Asked   Social History Narrative     Not on file     Social History     Social History Narrative     Not on file          Allergies:  Allergies   Allergen Reactions     Terazol [Terconazole] Hives       Current Medications:   Current Outpatient Medications   Medication Sig Dispense Refill     Blood Pressure Monitoring (BLOOD PRESSURE CUFF) MISC Use as needed to check blood pressure 1 each 0     hydrochlorothiazide (HYDRODIURIL) 25 MG tablet Take 1 tablet (25 mg) by mouth daily 90 tablet 0     levothyroxine (SYNTHROID/LEVOTHROID) 125 MCG tablet TAKE 1 TABLET BY MOUTH EVERY OTHER DAY ALTERNATING  WITH  137  MCG  TABLETS 45 tablet 2     levothyroxine (SYNTHROID/LEVOTHROID) 137 MCG tablet TAKE 1 TABLET BY MOUTH EVERY OTHER DAY ALTERNATING  WITH  125MCG  TABLETS 45 tablet 2     melatonin 3 MG tablet Take 1 tablet (3 mg) by mouth nightly as needed for sleep 90 tablet 1     naproxen (NAPROSYN) 500 MG tablet TAKE 1 TABLET BY MOUTH TWICE DAILY WITH MEALS 30 tablet 0     nystatin (MYCOSTATIN) 098813 UNIT/GM external powder Apply to affected area twice daily as needed 120 g 11     Prenatal Vit-Fe Fumarate-FA (PRENATAL MULTIVITAMIN  PLUS IRON) 27-0.8 MG TABS per tablet Take 1 tablet by mouth daily       diazepam (VALIUM) 5 MG tablet Take 1 tablet (5 mg) by mouth once as needed for anxiety (before MRI) (Patient not taking: Reported on  "6/8/2021) 5 tablet 0     gabapentin (NEURONTIN) 100 MG capsule Take 1 capsule (100 mg) by mouth 3 times daily 90 capsule 0          Current Pain Medications:  Medications related to Pain Management (From now, onward)    None           Past Pain Medications:  Naproxen  Hydrocodone  Diazepam    Blood thinner:    none    Work History: Works as a     Current work status: Currently on disability    Psychosocial History:     History of treatment for behavioral disorder: No  History of suicidal ideation or suicidal attempt: No    Review of Systems:  ROS      Physical Exam:     Vitals:    06/21/21 1242   BP: (!) 129/91   Pulse: 69   Resp: 16   Weight: 122 kg (269 lb)   Height: 1.6 m (5' 2.99\")       General Appearance: No distress, seated comfortably  Mood: Euthymic  HE ENT: Non constricted pupils  Respiratory: Non labored breathing  CVS: Regular rate and rhythm  GI: Soft, non distended, no TTP  Skin: No rashes over exposed skin  MS: ***  Neuro: ***  Gait: ***antalgic, ambulates with ***assistance    Pain specific exam:    ***    Laboratory results:  Recent Labs   Lab Test 05/19/21  0932 03/05/21  1533 10/12/20  1927   NA  --  136 139   POTASSIUM 4.4 4.3 3.9   CHLORIDE  --  107 106   CO2  --  23 27   ANIONGAP  --  6 6   GLC  --  91 84   BUN  --  16 15   CR  --  0.89 0.77   DAKOTA  --  9.1 9.0       CBC RESULTS:   Recent Labs   Lab Test 03/05/21  1533   WBC 7.5   RBC 4.26   HGB 13.7   HCT 42.2   MCV 99   MCH 32.2   MCHC 32.5   RDW 12.9            Imaging:   Lumbar MRI 5/11/2021                                               Impression:  1. Postsurgical changes of posterior and interbody fusion at L5-S1.  Residual trace anterolisthesis. Residual tiny disc bulge asymmetric to  the right with abutment of right exiting L5. No obvious neural  impingement or spinal canal stenosis.  2. Incompletely characterized possible lesion in the left adrenal  gland, seen in the superior most slice axial T2-weighted images. This  can " be artifact versus true lesion. Recommend dedicated upper abdomen  MRI for adrenal gland lesions.    ASSESSMENT AND PLAN:     Encounter Diagnosis:    ***    Eunice Barrientos is a 51 year old y.o. old female who presents to the pain clinic with ***    I have summarized the patient s past medical history, discussed their clinical findings and the potential differential diagnosis with the patient. Significant past medical history pertinent to the patient s current condition includes ***.  The clinical findings reveal ***. The differential diagnosis discussed with the patient are listed above. I have discussed anatomy and possible sources of the pain using models and/or pictures (diagrams). I have discussed multi- disciplinary pain management options withthe patient as pertaining to their case as detailed above. The pain management options we discussed included, but were not limited to the recommendations below.  I also discussed with patient the risks, benefits and alternatives to each pain management option.  All of the patient s questions and concerns were answered to the best of my ability.    RECOMMENDATIONS:     1. Medications: We are prescribing the patient ***. Dosing, side effects, risks/benefits/alternatives were discussed with the patient in detail.    2. Procedure: We are scheduling the patient for *** in the procedure suite. Risks/benefits/alternatives were discussed.     I also discussed with the patient that the possible risks involved with interventional treatment included, but are not limited to, no pain control, worsened pain, stroke,seizure, spinal headache, allergic reactions, introduction of infection or bleeding which may lead to emergent spine surgery, nerve damage, paralysis oreven death.    3. Physical therapy: I have refered the patient for outpatient physical therapy for stretching, strengthening and home exercise program for ***. The patient will also discuss spine care and posture. Pool  therapy and stretches can be considered if available.    4.     Follow up: ***        Again, thank you for allowing me to participate in the care of your patient.      Sincerely,    Linda Garrison MD

## 2021-06-21 NOTE — PATIENT INSTRUCTIONS
Referrals:    Physical Therapy Referral placed-  If you have not heard from the scheduling office within 2 business days, please call [phone] for all locations      Procedures:    INJECTION, EPIDURAL, CAUDAL.    Call to schedule your procedure: [phone], option #2      Your pre-procedure instructions are below, please call our clinic if you have any questions.      Recommended Follow up:      Follow up in 4 weeks after procedure.       Please call [phone], option #1 to schedule your clinic appointment if you don't already have an appointment scheduled.      Procedure Information related to COVID-19    Please call [phone] option #2 to schedule, reschedule, or cancel your procedure appointment.   Phones are answered Monday - Friday from 08:00 - 4:30pm.  Leave a voicemail with your name, birth date, and phone number if no one is available to take your call.     You will need to be tested for COVID-19 within 4 days (96 hours) of your procedure.  You will be called to schedule your COVID test by a central scheduling team. If you have not been contacted to schedule your test within 4 days of the scheduled procedure, call [phone]    Please be aware that the turn around time for the test is approximately 24-72 hours.   If your results are still pending the day of your procedure, you will be notified as soon as possible as the procedure may be cancelled.    Please note: You will only be contacted for positive and pending results.     The procedure center staff will call you several days before the procedure to review important information that you will need to know for the day of the procedure.   Please contact the clinic if you have further questions about this information.       Information related to Scheduling and Pre-Procedure Instructions:        After calling to schedule your procedure appointment, please contact the clinic to make your follow up clinic appointment 4 weeks after the  procedure.  Clinic phone- [phone], option #1      If you are having a Diagnostic procedure, you will be given a Pain Diary to document your pain relief.   Please fax the completed form to our office.   fax number is [phone]    If you must reschedule your procedure more than two times, you must follow up in clinic before rescheduling again.        Preparing for your procedure    CAUTION - FAILURE TO FOLLOW THESE PRE-PROCEDURE INSTRUCTIONS WILL RESULT IN YOUR PROCEDURE BEING RESCHEDULED.      Your procedure: INJECTION, EPIDURAL, CAUDAL      Pregnancy  If you are pregnant, or think you may be pregnant, please notify our staff. This may or may not affect the ability to perform the procedure.       You must have a  take you home after your procedure. Transportation by taxi or para-transit is okay as long as you have a responsible adult accompany you. You must provide your 's full name and contact number at time of check in.     Fasting Protocol Please have nothing to eat and drink for 2 hours before the procedure.      Medications If you take any medications, DO NOT STOP. Take your medications as usual the day of your procedure with a sip of water AT LEAST 2 HOURS PRIOR TO ARRIVAL.    Antibiotics If you are currently taking antibiotics, you must complete the entire dose 7 days prior to your scheduled procedure. You must be clear of any signs or symptoms of infection. If you begin antibiotics, please contact our clinic for instructions.     Fever, Chills, or Rash If you experience a fever of higher than 100 degrees, chills, rash, or open wounds during the one week before your procedure, please call the clinic to see if you may proceed with your procedure.      Medication Hold List  **Patients under Cardiology/Neurology care should consult their provider prior to the pain procedure to verify pre-procedure medication instructions. The information below contains general guidelines.**        Blood  Thinners If you are taking daily ASPIRIN, PLAVIX, OR OTHER BLOOD THINNERS SUCH AS COUMADIN/WARFARIN, we will need your prescribing doctor to sign a release permitting you to stop these medications. Once approved by your prescribing doctor - STOP ALL BLOOD THINNERS BASED ON THE TIME TABLE BELOW PRIOR TO YOUR PROCEDURE. If you have been instructed to stop WARFARIN(COUMADIN), you must have an INR lab drawn the day before your procedure. . Your INR must be within normal limits before we can perform your injection. MEDICATIONS CAN BE RESTARTED AFTER YOUR PROCEDURE.    14 DAY HOLD  Ticlid (ticlopidine)    10 DAY HOLD  Effient (Prasugel)    3 DAY HOLD  Xarelto (rivaroxaban) 7 DAY HOLD  Anacin, Bufferin, Ecotrin, Excedrin, Aggrenox (Aspirin)  Brilinta (ticagrelor)  Coumadin (Warfarin)  Pradexa (Dabigatran)  Elmiron (Pentosan)  Plavix (Clopidogrel Bisulfate)  Pletal (Cilostazol)    24 HOUR HOLD  Lovenox (enoxaparin)  Agrylin (Anagrelide)        Non-steroidal Anti-inflammatories (NSAIDs) DO NOT TAKE any non-steroidal anti-inflammatory medications (NSAIDs) listed on the table below. MEDICATIONS CAN BE RESTARTED AFTER YOUR PROCEDURE. Celebrex is OK to take and does not need to be discontinued.     Medications to stop:  3 DAY HOLD  Advil, Motrin (Ibuprofen)  Arthrotec (diciofenac sodium/misoprostol)  Clinoril (Sulindac)  Indocin (Indomethacin)  Lodine (Etodolac)  Toradol (Ketorolac)  Vicoprofen (Hydrocodone and Ibuprofen)  Voltaren (Diclotenac)    14 DAY HOLD  Daypro (Oxaprozin)  Feldene (Piroxicam)   7 DAY HOLD  Aleve (Naproxen sodium)  Darvon compound (contains aspirin)  Naprosyn (Naproxen)  Norgesic Forte (contains aspirin)  Mobic (Meloxicam)  Oruvall (Ketoprofen)  Percodan (contains aspirin)  Relafen (Nabumetone)  Salsalate  Trilisate  Vitamin E (more than 400 mg per day)  Any medication containing aspirin                To speak with a nurse, schedule/reschedule/cancel a clinic appointment, or request a medication refill  call: [phone]     You can also reach us by MyChart: [email or web address]

## 2021-06-21 NOTE — TELEPHONE ENCOUNTER
DIAGNOSIS: Chronic right shoulder pain/XR/Kiara Quintana CNP/B+/orthocon   APPOINTMENT DATE: 6/28/21   NOTES STATUS DETAILS   OFFICE NOTE from referring provider Internal Kiara Quintana APRN CNP in  FAMILY PRACTICE   OFFICE NOTE from other specialist internal    DISCHARGE SUMMARY from hospital N/A    DISCHARGE REPORT from the ER N/A    OPERATIVE REPORT N/A    EMG report N/A    MEDICATION LIST Internal    MRI N/A    DEXA (osteoporosis/bone health) N/A    CT SCAN N/A    XRAYS (IMAGES & REPORTS) Internal 6/17/2021

## 2021-06-22 ENCOUNTER — HOSPITAL ENCOUNTER (OUTPATIENT)
Facility: AMBULATORY SURGERY CENTER | Age: 51
End: 2021-06-22
Attending: ANESTHESIOLOGY
Payer: COMMERCIAL

## 2021-06-22 DIAGNOSIS — M54.17 LUMBOSACRAL RADICULOPATHY: ICD-10-CM

## 2021-06-22 DIAGNOSIS — Z11.59 ENCOUNTER FOR SCREENING FOR OTHER VIRAL DISEASES: ICD-10-CM

## 2021-06-28 ENCOUNTER — PRE VISIT (OUTPATIENT)
Dept: ORTHOPEDICS | Facility: CLINIC | Age: 51
End: 2021-06-28

## 2021-06-30 ENCOUNTER — TELEPHONE (OUTPATIENT)
Dept: GASTROENTEROLOGY | Facility: CLINIC | Age: 51
End: 2021-06-30

## 2021-06-30 DIAGNOSIS — Z11.59 ENCOUNTER FOR SCREENING FOR OTHER VIRAL DISEASES: ICD-10-CM

## 2021-06-30 NOTE — TELEPHONE ENCOUNTER
Screening Questions  1. Are you active on mychart? yes    2. What insurance is in the chart? On chart    2.  Ordering/Referring Provider: Kiara Quintana APRN CNP in [facility]      3. BMI 49.4    4. Are you on daily home oxygen? no    5. Do you have a history of difficult airway? no    6. Have you had a heart, lung, or liver transplant? no    7. Are you currently on dialysis? no    8. Have you had a stroke or Transient ischemic atttack (TIA) within 6 months? no    9. In the past 6 months, have you had any heart related issues including cardiomyopathy or heart attack?         If yes, did it require cardiac stenting or other implantable device?no    10. Do you have any implantable devices in your body (pacemaker, defib, LVAD)? no    11. Do you take nitroglycerin? If yes, how often? no    12. Are you currently taking any blood thinners?no    13. Are you a diabetic? no    14. (Females) Are you currently pregnant? no  If yes, how many weeks?    15. Have you had a procedure in the past that was difficult to tolerate with conscious sedation? Any allergies to Fentanyl or Versed no    16. Are you taking any scheduled prescription narcotics more than once daily? no    17. Do you have any chemical dependencies such as alcohol, street drugs, or methadone? no    18. Do you have any history of post-traumatic stress syndrome or mental health issues? PTSD    19. Do you transfer independently? yes    20.  Do you have any issues with constipation? Sometimes    21. Preferred Pharmacy for Pre Prescription On chart    Scheduling Details    Procedure Scheduled: Colonoscopy  Provider/Surgeon: Taylor  Date of Procedure: 7/8/21  Location: [facility]  Caller (Please ask for phone number if not scheduled by patient): Eunice      Sedation Type: CS  Conscious Sedation- Needs  for 6 hours after the procedure  MAC/General-Needs  for 24 hours after procedure    Pre-op Required at [facility] [place], and OR for MAC  sedation:   (if yes advise patient they will need a pre-op prior to procedure)      Is patient on blood thinners? -no (If yes- inform patient to follow up with PCP or provider for follow up instructions)     Informed patient they will need an adult  yes  Cannot take any type of public or medical transportation alone    Informed Patient of COVID Test Requirement yes    Confirmed Nurse will call to complete assessment yes    Additional comments: n/a

## 2021-07-01 ENCOUNTER — TELEPHONE (OUTPATIENT)
Dept: GASTROENTEROLOGY | Facility: CLINIC | Age: 51
End: 2021-07-01

## 2021-07-01 DIAGNOSIS — R19.7 BLOODY DIARRHEA: Primary | ICD-10-CM

## 2021-07-01 NOTE — TELEPHONE ENCOUNTER
Pharmacy: verify pharmacy.  [facility] have in stock; Extended prep d/t BMI 49.4    Indication for Procedure: bloody diarrhea, post prandial diarrhea    Referring Provider: Kiara Quintana APRN CNP    Arrival Time: 0830    Attempted to contact patient regarding upcoming colonoscopy procedure on 7.8.2021 for pre assessment questions. No answer.     Left message to return call to [phone] #2    Covid test scheduled 7.5.2021    Jenise Paul RN

## 2021-07-02 RX ORDER — BISACODYL 5 MG
TABLET, DELAYED RELEASE (ENTERIC COATED) ORAL
Qty: 2 TABLET | Refills: 0 | Status: SHIPPED | OUTPATIENT
Start: 2021-07-02

## 2021-07-05 ENCOUNTER — TELEPHONE (OUTPATIENT)
Dept: GASTROENTEROLOGY | Facility: CLINIC | Age: 51
End: 2021-07-05

## 2021-07-05 DIAGNOSIS — Z11.59 ENCOUNTER FOR SCREENING FOR OTHER VIRAL DISEASES: Primary | ICD-10-CM

## 2021-07-05 NOTE — TELEPHONE ENCOUNTER
Pt changed colonoscopy to 7.16.2021.    Reviewed date, time, and location.     Arrival time: 1400    RN contact pt's pharmacy [facility] to confirm prep prescription.  Pharmacy staff stated that the Golytely will arrive tomorrow and pt's prescription should be ready for  around 2pm.  [facility] will text pt.     RN informed pt of message from pharmacy.    Resent updated colonoscopy prep instruction with correct dates and times d/t r/s.    Pt has no further questions or concerns.      Jenise Paul RN

## 2021-07-05 NOTE — TELEPHONE ENCOUNTER
Caller: Eunice    Procedure: Colonoscopy    Date of Procedure Cancelled: 07/08    Ordering Provider:Kiara Quintana APRN CNP    Reason for cancel: Patient is having car problems, Patient has also not heard from the pharmacy regarding prep. Patient looking to reschedule    Rescheduled: YES     If rescheduled    Date: 7/16    Location: [facility]    Note any change or update to original order/sedation: Different provider.

## 2021-07-08 ENCOUNTER — NURSE TRIAGE (OUTPATIENT)
Dept: FAMILY MEDICINE | Facility: CLINIC | Age: 51
End: 2021-07-08

## 2021-07-08 ENCOUNTER — TELEPHONE (OUTPATIENT)
Dept: OPHTHALMOLOGY | Facility: CLINIC | Age: 51
End: 2021-07-08

## 2021-07-08 NOTE — TELEPHONE ENCOUNTER
"Received call from patient. She stated that she is having visual changes-- black \"floaters\" started from L side of eye and going across. Started to occur in the R eye when on phone with patient as well. She denies any other symptoms. Per protocol, she should be seen today in office, however do not have access to opthalmology scheduling. Routing urgently to Optho to please assist.    Of note, patient was advised to go to urgent care if no available appointments in ophthalmology.     Reason for Disposition    Many floaters in the eye    Additional Information    Negative: Weakness of the face, arm or leg on one side of the body    Negative: Followed getting substance in the eye    Negative: Foreign body or object is or was lodged in the eye    Negative: Followed an eye injury    Negative: Followed sun lamp or sun exposure (UV keratitis)    Negative: Yellow or green discharge (pus) in the eye    Negative: Pregnant    Negative: Complete loss of vision in 1 or both eyes    Negative: Severe eye pain    Negative: Severe headache    Negative: Double vision    Negative: Blurred vision or visual changes and present now and sudden onset or new (e.g., minutes, hours, days) (Exception: seeing floaters / black specks OR previously diagnosed migraine headaches with same symptoms)    Negative: Patient sounds very sick or weak to the triager    Negative: Flashes of light  (Exception: brief from pressing on the eyeball)    Negative: Eye pain and brief (now gone) blurred vision or visual changes    Negative: Taking digoxin (e.g., Lanoxin, Digitek, Cardoxin, Lanoxicaps; Toloxin in [place]) and blurred vision, yellow vision, or yellow-green halos    Answer Assessment - Initial Assessment Questions  1. DESCRIPTION: \"What is the vision loss like? Describe it for me.\" (e.g., complete vision loss, blurred vision, double vision, floaters, etc.)      Black floaters, small circles   2. LOCATION: \"One or both eyes?\" If one, ask: \"Which eye?\"     " " L eye  3. SEVERITY: \"Can you see anything?\" If so, ask: \"What can you see?\" (e.g., fine print)      Can read fine print  4. ONSET: \"When did this begin?\" \"Did it start suddenly or has this been gradual?\"      This morning, sudden onset  5. PATTERN: \"Does this come and go, or has it been constant since it started?\"      Comes and goes  6. PAIN: \"Is there any pain in your eye(s)?\"  (Scale 1-10; or mild, moderate, severe)      No pain  7. CONTACTS-GLASSES: \"Do you wear contacts or glasses?\"      Glasses and contacts  8. CAUSE: \"What do you think is causing this visual problem?\"      Unsure  9. OTHER SYMPTOMS: \"Do you have any other symptoms?\" (e.g., confusion, headache, arm or leg weakness, speech problems)      None  10. PREGNANCY: \"Is there any chance you are pregnant?\" \"When was your last menstrual period?\"        None    Protocols used: VISION LOSS OR CHANGE-A-OH    DALTON HodgesN JUVENTINO  [facility], [place]  "

## 2021-07-08 NOTE — TELEPHONE ENCOUNTER
Spoke with patient (see previous encounter notes - I was unable to attach documentation to the original encounter).  Starting about one hour ago, she noticed new dark spots (the size of gnats) in her vision, which are variable in darkness.  Usually she only see's one at a time, and will float across her vision randomly.  She is uncertain if one eye or both, but has very poor vision in her left eye.  No flashing lights or curtain-like loss of vision.  Sharpness of her vision remains the same.  She is unable to get transportation to our office today, but would be available tomorrow.  We do not have a Doctor in the office tomorrow, so I suggested she contact [facility], and schedule an appointment for tomorrow.

## 2021-07-09 ENCOUNTER — NURSE TRIAGE (OUTPATIENT)
Dept: FAMILY MEDICINE | Facility: CLINIC | Age: 51
End: 2021-07-09

## 2021-07-09 DIAGNOSIS — I10 HYPERTENSION GOAL BP (BLOOD PRESSURE) < 140/90: Primary | ICD-10-CM

## 2021-07-09 RX ORDER — AMLODIPINE BESYLATE 2.5 MG/1
2.5 TABLET ORAL DAILY
Qty: 30 TABLET | Refills: 0 | Status: SHIPPED | OUTPATIENT
Start: 2021-07-09 | End: 2021-08-20

## 2021-07-09 NOTE — TELEPHONE ENCOUNTER
Add Amlodipine and follow up in 2 weeks with me in clinic. See eye doctor as recommended re floaters in vision.  Kiara Quintana, CNP

## 2021-07-09 NOTE — TELEPHONE ENCOUNTER
"See triage and opth note from yesterday 7/8/21. Patient spoke with [facility] clinic was advised to monitor symptoms.  Patient had floaters again today around 11 am. This prompted her to take her blood pressure. Readings were elevated: 161/106, 156/112, 153/94 (see below). No blurred or double vision. Patient reports she had mild dizziness this morning, now resolved. She recently had dose increase of blood pressure medication 6/17/21.    Please advise: patient would like to know if blood pressure  med should be changed.   UC visit was recommended.     Reason for Disposition    Many floaters in the eye    Additional Information    Negative: BP Systolic BP >= 140 OR Diastolic >= 90 and postpartum (from 0 to 6 weeks after delivery)    Negative: Sounds like a life-threatening emergency to the triager    Negative: Pregnant > 20 weeks or postpartum (< 6 weeks after delivery) and new hand or face swelling    Negative: Pregnant > 20 weeks and BP > 140/90    Negative: Ran out of BP medications    Negative: Taking BP medications and feels is having side effects (e.g., impotence, cough, dizziness)    Answer Assessment - Initial Assessment Questions  1. BLOOD PRESSURE: \"What is the blood pressure?\" \"Did you take at least two measurements 5 minutes apart?\"      11:00 161/106, 12:30 156/112, 12:48 153/94  2. ONSET: \"When did you take your blood pressure?\"      Today because floaters are back   3. HOW: \"How did you obtain the blood pressure?\" (e.g., visiting nurse, automatic home BP monitor)      Home bp  4. HISTORY: \"Do you have a history of high blood pressure?\"      yes  5. MEDICATIONS: \"Are you taking any medications for blood pressure?\" \"Have you missed any doses recently?\"      Took meds around 7   6. OTHER SYMPTOMS: \"Do you have any symptoms?\" (e.g., headache, chest pain, blurred vision, difficulty breathing, weakness)      Dizzy this morning   7. PREGNANCY: \"Is there any chance you are pregnant?\" \"When was your last menstrual " "period?\"      no    Protocols used: VISION LOSS OR CHANGE-A-OH, HIGH BLOOD PRESSURE-A-OH    Silvana Martinez RN    "

## 2021-07-09 NOTE — TELEPHONE ENCOUNTER
Spoke with Eunice and updated her with message from Kiara. Pt will call Monday to schedule an appointment with Kiara and writer encouraged pt to call with questions, concerns, or new symptoms after starting meds.    Stephanie BROWN RN, BSN  [facility]

## 2021-07-11 DIAGNOSIS — E03.9 ACQUIRED HYPOTHYROIDISM: ICD-10-CM

## 2021-07-12 DIAGNOSIS — I10 HYPERTENSION GOAL BP (BLOOD PRESSURE) < 140/90: ICD-10-CM

## 2021-07-13 ENCOUNTER — HOSPITAL ENCOUNTER (EMERGENCY)
Facility: CLINIC | Age: 51
Discharge: HOME | End: 2021-07-13
Attending: EMERGENCY MEDICINE | Admitting: EMERGENCY MEDICINE
Payer: COMMERCIAL

## 2021-07-13 ENCOUNTER — APPOINTMENT (OUTPATIENT)
Dept: GENERAL RADIOLOGY | Facility: CLINIC | Age: 51
End: 2021-07-13
Attending: EMERGENCY MEDICINE
Payer: COMMERCIAL

## 2021-07-13 ENCOUNTER — LAB (OUTPATIENT)
Dept: LAB | Facility: CLINIC | Age: 51
End: 2021-07-13

## 2021-07-13 VITALS
TEMPERATURE: 98.4 F | HEART RATE: 95 BPM | OXYGEN SATURATION: 96 % | SYSTOLIC BLOOD PRESSURE: 162 MMHG | RESPIRATION RATE: 16 BRPM | DIASTOLIC BLOOD PRESSURE: 96 MMHG

## 2021-07-13 DIAGNOSIS — Z11.59 ENCOUNTER FOR SCREENING FOR OTHER VIRAL DISEASES: ICD-10-CM

## 2021-07-13 LAB — SARS-COV-2 RNA RESP QL NAA+PROBE: NEGATIVE

## 2021-07-13 PROCEDURE — 73610 X-RAY EXAM OF ANKLE: CPT | Mod: RT

## 2021-07-13 PROCEDURE — 73610 X-RAY EXAM OF ANKLE: CPT | Mod: 26 | Performed by: RADIOLOGY

## 2021-07-13 PROCEDURE — U0003 INFECTIOUS AGENT DETECTION BY NUCLEIC ACID (DNA OR RNA); SEVERE ACUTE RESPIRATORY SYNDROME CORONAVIRUS 2 (SARS-COV-2) (CORONAVIRUS DISEASE [COVID-19]), AMPLIFIED PROBE TECHNIQUE, MAKING USE OF HIGH THROUGHPUT TECHNOLOGIES AS DESCRIBED BY CMS-2020-01-R: HCPCS | Performed by: COLON & RECTAL SURGERY

## 2021-07-13 PROCEDURE — 999N000104 HC STATISTIC NO CHARGE: Performed by: EMERGENCY MEDICINE

## 2021-07-13 RX ORDER — LEVOTHYROXINE SODIUM 137 UG/1
TABLET ORAL
Qty: 45 TABLET | Refills: 2 | Status: SHIPPED | OUTPATIENT
Start: 2021-07-13

## 2021-07-13 RX ORDER — LEVOTHYROXINE SODIUM 125 UG/1
TABLET ORAL
Qty: 45 TABLET | Refills: 2 | Status: SHIPPED | OUTPATIENT
Start: 2021-07-13

## 2021-07-13 NOTE — ED TRIAGE NOTES
Fell yesterday on uneven floor. Twist ankle. Presents today for evaluation of right ankle pain. Patient reports that her foot feels numb, but can feel touch. Pulses and movement intact.

## 2021-07-13 NOTE — ED PROVIDER NOTES
"ED Provider Note  [facility]      History     Chief Complaint   Patient presents with     Fall     Ankle Pain     HPI  Eunice Barrientos is a 51 year old female with a PMH of hydrocephalus S/P intracranial shunt, DVT, PE, h/o blood transfusion, hypothyroidism, HTN, S/P cholecystectomy, S/P left oophorectomy and S/P LEEP who presents to the ED today after a fall complaining of right ankle pain. ***    {Past History:954118}      Review of Systems  {Complete vs limited ROS:654216::\"A complete review of systems was performed with pertinent positives and negatives noted in the HPI, and all other systems negative.\"}    Physical Exam   BP: (!) 162/96  Pulse: 95  Temp: 98.4  F (36.9  C)  Resp: 16  SpO2: 96 %  Physical Exam  ***  ED Course      Procedures       {ED Course Selections:564862}       Results for orders placed or performed during the hospital encounter of 07/13/21   Ankle XR, G/E 3 views, right     Status: None    Narrative    3 views right ankle radiographs 7/13/2021 2:17 PM    History: fall, pain     Comparison: None    Findings:    Nonweightbearing AP, oblique, and lateral  views of the right ankle  were obtained.     No acute osseous abnormality.      Ankle mortise and syndesmosis are congruent on this non-weight bearing  study. Achilles tendon insertional and plantar calcaneal enthesopathy.  Os trigonum. Lateral talar dome subchondral cyst.    Soft tissue is unremarkable.      Impression    Impression:    No acute osseous abnormality.    GRISELDA VILLAFANA MD (Joe)         SYSTEM ID:  [id number]   Results for orders placed or performed in visit on 07/13/21   Asymptomatic COVID-19 Virus (Coronavirus) by PCR Nose     Status: None (In process)    Specimen: Nose; Swab    Narrative    The following orders were created for panel order Asymptomatic COVID-19 Virus (Coronavirus) by PCR Nose.  Procedure                               Abnormality         Status                     ---------     "                           -----------         ------                     SARS-COV2 (COVID-19) Vir...[[id number]]                      In process                   Please view results for these tests on the individual orders.     Medications - No data to display     Assessments & Plan (with Medical Decision Making)   ***    I have reviewed the nursing notes. I have reviewed the findings, diagnosis, plan and need for follow up with the patient.    New Prescriptions    No medications on file       Final diagnoses:   None       --  ***  [facility] EMERGENCY DEPARTMENT  7/13/2021

## 2021-07-13 NOTE — TELEPHONE ENCOUNTER
Prescription approved per [facility] Refill Protocol.    Balbina Escobar RN

## 2021-07-14 RX ORDER — HYDROCHLOROTHIAZIDE 25 MG/1
TABLET ORAL
Qty: 90 TABLET | Refills: 0 | OUTPATIENT
Start: 2021-07-14

## 2021-07-14 NOTE — TELEPHONE ENCOUNTER
Left message on answering machine for patient to call back to the nurse at [phone].    When patient returns call-prescription was sent for #[phone] to [facility] pharmacy 6/17/21.     Request did not pass protocol:  BP Readings from Last 3 Encounters:   07/13/21 (!) 162/96   06/21/21 (!) 129/91   06/17/21 (!) 142/84       Silvana Martinez RN  [facility]

## 2021-07-16 ENCOUNTER — DOCUMENTATION ONLY (OUTPATIENT)
Dept: GASTROENTEROLOGY | Facility: OUTPATIENT CENTER | Age: 51
End: 2021-07-16
Payer: COMMERCIAL

## 2021-07-16 ENCOUNTER — TRANSFERRED RECORDS (OUTPATIENT)
Dept: HEALTH INFORMATION MANAGEMENT | Facility: CLINIC | Age: 51
End: 2021-07-16

## 2021-07-16 DIAGNOSIS — R10.84 ABDOMINAL PAIN, GENERALIZED: ICD-10-CM

## 2021-07-16 DIAGNOSIS — R19.7 BLOODY DIARRHEA: ICD-10-CM

## 2021-07-16 DIAGNOSIS — K92.1 HEMATOCHEZIA: Primary | ICD-10-CM

## 2021-07-16 PROCEDURE — 88305 TISSUE EXAM BY PATHOLOGIST: CPT | Mod: TC,ORL | Performed by: COLON & RECTAL SURGERY

## 2021-07-16 PROCEDURE — 88305 TISSUE EXAM BY PATHOLOGIST: CPT | Mod: 26 | Performed by: PATHOLOGY

## 2021-07-19 ENCOUNTER — LAB REQUISITION (OUTPATIENT)
Dept: LAB | Facility: CLINIC | Age: 51
End: 2021-07-19
Payer: COMMERCIAL

## 2021-07-19 ENCOUNTER — PATIENT OUTREACH (OUTPATIENT)
Dept: SURGERY | Facility: CLINIC | Age: 51
End: 2021-07-19

## 2021-07-19 ENCOUNTER — TELEPHONE (OUTPATIENT)
Dept: GASTROENTEROLOGY | Facility: CLINIC | Age: 51
End: 2021-07-19

## 2021-07-19 ENCOUNTER — TELEPHONE (OUTPATIENT)
Dept: NURSING | Facility: CLINIC | Age: 51
End: 2021-07-19

## 2021-07-19 DIAGNOSIS — Z11.59 ENCOUNTER FOR SCREENING FOR OTHER VIRAL DISEASES: ICD-10-CM

## 2021-07-19 NOTE — TELEPHONE ENCOUNTER
Screening Questions done. See encounter on 6/31/21.    Scheduling Details    Colonoscopy Prep Sent?:   Procedure Scheduled: EGD  Provider/Surgeon:   Date of Procedure: 8/3  Location: [facility]  Caller (Please ask for phone number if not scheduled by patient): Eunice      Sedation Type: MAC  Conscious Sedation- Needs  for 6 hours after the procedure  MAC/General-Needs  for 24 hours after procedure    Pre-op Required at [facility], [place], [place] and OR for MAC sedation:   (if yes advise patient they will need a pre-op prior to procedure)      Is patient on blood thinners? -N (If yes- inform patient to follow up with PCP or provider for follow up instructions)     Informed patient they will need an adult  y  Cannot take any type of public or medical transportation alone    Informed Patient of COVID Test Requirement y    Confirmed Nurse will call to complete assessment     Additional comments:

## 2021-07-19 NOTE — PROGRESS NOTES
Per patient she has been having cramping when eating that has gotten worse. She does report blood on her stool and toilet paper. She does have chills but no fever. She does not have a thermometer. She does report mild dizziness. I told her if the bleeding gets worse, pain is uncontrollable or she develops a fever to go in. Plan is for CT and EGD for further workup.

## 2021-07-19 NOTE — TELEPHONE ENCOUNTER
".  [facility]: Nurse Triage Note  SITUATION/BACKGROUND                                                      Eunice Barrientos is a 51 year old female s/p colonoscopy on 7/16/21.   Patient calls to report having diarrhea with blood today for the 1st time since procedure on 7/16/21. \"It seems like every time I try and eat something abdominal pain becomes worse\".   Yesterday, patient ate boiled potatoes and carrots from a Roast with no problem. Today, she ate a sandwich and some cherries and within 1/2 hour abdominal pain 8/10 and bloody diarrhea.   Contacted Surgery and spoke to Kristen JAUREGUI. She consulted with Kristen QUILES - she will call patient back for further assistance at [phone].   This nurse informed patient. understanding verbalized and awaiting a call back from Kristen JAUREGUI.       "

## 2021-07-20 LAB
PATH REPORT.COMMENTS IMP SPEC: NORMAL
PATH REPORT.COMMENTS IMP SPEC: NORMAL
PATH REPORT.FINAL DX SPEC: NORMAL
PATH REPORT.GROSS SPEC: NORMAL
PATH REPORT.MICROSCOPIC SPEC OTHER STN: NORMAL
PATH REPORT.RELEVANT HX SPEC: NORMAL
PHOTO IMAGE: NORMAL

## 2021-07-22 ENCOUNTER — TELEPHONE (OUTPATIENT)
Dept: GASTROENTEROLOGY | Facility: OUTPATIENT CENTER | Age: 51
End: 2021-07-22

## 2021-07-22 NOTE — TELEPHONE ENCOUNTER
Is patient taking blood thinners/antiplatelet medications? No    Heart Disease? Denies     Lung Disease? Denies     Sleep Apnea? Denies     Diabetic? Denies     Kidney Disease? Denies     Electronic Implantable Devices? Denies     PTSD? N/a    Prep instructions reviewed with patient? Instructions,  policy, MAC sedation plan reviewed. Instructed patient to have someone stay with her for 24 hours post exam    : Yes    Pharmacy: N/a    Indication for Procedure: Bloody diarrhea    Referring Provider: Kiara Quintana APRN CNP    Arrival Time: 7 AM    COVID test? 7-30 [facility]    Alisha Singleton RN

## 2021-07-23 DIAGNOSIS — Z11.59 ENCOUNTER FOR SCREENING FOR OTHER VIRAL DISEASES: ICD-10-CM

## 2021-07-23 DIAGNOSIS — M54.17 LUMBOSACRAL RADICULOPATHY: Primary | ICD-10-CM

## 2021-07-24 ENCOUNTER — NURSE TRIAGE (OUTPATIENT)
Dept: NURSING | Facility: CLINIC | Age: 51
End: 2021-07-24

## 2021-07-25 NOTE — TELEPHONE ENCOUNTER
"\"On and off for about a year, I have had issues with my right upper chest between the shoulder and the breast. Today it got really bad, the upper part of my arm became red and swollen from the elbow up. Pain in chest. Hx of blood clot in this upper arm. Seen a couple of weeks ago and it did not show a clot. I can't use my right hand very well because of the pain\". About an hour ago, she says felt dizzy, like she was going to pass out (but didn't). It lasted for about 20-30 minutes. RX Amlodipine for HTN started 2 weeks ago. Sharp, stabbing, deep  throbbing pains in my chest. Currently pain in the chest=\"7\". She has not taken any pain medication. She says it hurts in the upper shoulder to take a deep breath.   Surgery 6/26/21 Colonoscopy and polyp removal. IV in that arm, but no problem noted afterwards with the IV site.   Triaged to a disposition of Call 911 now. Patient states she is going to have someone bring her to the nearest ER now.     Carolina Cheng RN Triage Nurse Advisor 8:25 PM 7/24/2021    Reason for Disposition    [1] Chest pain lasts > 5 minutes AND [2] age > 30 AND [3] at least one cardiac risk factor (i.e., hypertension, diabetes, obesity, smoker or strong family history of heart disease)    Additional Information    Negative: Severe difficulty breathing (e.g., struggling for each breath, speaks in single words)    Negative: Difficult to awaken or acting confused (e.g., disoriented, slurred speech)    Negative: Shock suspected (e.g., cold/pale/clammy skin, too weak to stand, low BP, rapid pulse)    Negative: [1] Chest pain lasts > 5 minutes AND [2] history of heart disease  (i.e., heart attack, bypass surgery, angina, angioplasty, CHF; not just a heart murmur)    Negative: [1] Chest pain lasts > 5 minutes AND [2] described as crushing, pressure-like, or heavy    Negative: [1] Chest pain lasts > 5 minutes AND [2] age > 50    Protocols used: CHEST PAIN-A-AH  COVID 19 Nurse Triage Plan/Patient " Instructions    Please be aware that novel coronavirus (COVID-19) may be circulating in the community. If you develop symptoms such as fever, cough, or SOB or if you have concerns about the presence of another infection including coronavirus (COVID-19), please contact your health care provider or visit [email or web address].     Disposition/Instructions    Call to EMS/911 recommended. Follow protocol based instructions.     Bring Your Own Device:  Please also bring your smart device(s) (smart phones, tablets, laptops) and their charging cables for your personal use and to communicate with your care team during your visit.    Thank you for taking steps to prevent the spread of this virus.  o Limit your contact with others.  o Wear a simple mask to cover your cough.  o Wash your hands well and often.    Resources    [facility]: About COVID-19: [email or web address]    CDC: What to Do If You're Sick: www.cdc.gov/coronavirus/2019-ncov/about/steps-when-sick.html    CDC: Ending Home Isolation: www.cdc.gov/coronavirus/2019-ncov/hcp/disposition-in-home-patients.html     CDC: Caring for Someone: www.cdc.gov/coronavirus/2019-ncov/if-you-are-sick/care-for-someone.html     [facility]: Interim Guidance for Hospital Discharge to Home: www.health.[facility].mn.us/diseases/coronavirus/hcp/hospdischarge.pdf    [facility] clinical trials (COVID-19 research studies): clinicalaffairs.[facility]-clinical-trials     Below are the COVID-19 hotlines at the [facility] ([facility]). Interpreters are available.   o For health questions: Call 116-702-0086 or 1-855.346.6120 (7 a.m. to 7 p.m.)  o For questions about schools and childcare: Call 761-485-9591 or 1-203.568.3910 (7 a.m. to 7 p.m.)

## 2021-07-26 ENCOUNTER — TELEPHONE (OUTPATIENT)
Dept: FAMILY MEDICINE | Facility: CLINIC | Age: 51
End: 2021-07-26

## 2021-07-26 ENCOUNTER — ANCILLARY PROCEDURE (OUTPATIENT)
Dept: GENERAL RADIOLOGY | Facility: CLINIC | Age: 51
End: 2021-07-26
Attending: NURSE PRACTITIONER
Payer: COMMERCIAL

## 2021-07-26 ENCOUNTER — OFFICE VISIT (OUTPATIENT)
Dept: FAMILY MEDICINE | Facility: CLINIC | Age: 51
End: 2021-07-26
Payer: COMMERCIAL

## 2021-07-26 ENCOUNTER — NURSE TRIAGE (OUTPATIENT)
Dept: NURSING | Facility: CLINIC | Age: 51
End: 2021-07-26

## 2021-07-26 VITALS
SYSTOLIC BLOOD PRESSURE: 122 MMHG | HEIGHT: 63 IN | DIASTOLIC BLOOD PRESSURE: 90 MMHG | RESPIRATION RATE: 20 BRPM | OXYGEN SATURATION: 97 % | TEMPERATURE: 98.2 F | BODY MASS INDEX: 46.42 KG/M2 | WEIGHT: 262 LBS | HEART RATE: 83 BPM

## 2021-07-26 DIAGNOSIS — I82.890 SUPERFICIAL VEIN THROMBOSIS: ICD-10-CM

## 2021-07-26 DIAGNOSIS — Z86.718 PERSONAL HISTORY OF DVT (DEEP VEIN THROMBOSIS): ICD-10-CM

## 2021-07-26 DIAGNOSIS — R07.9 CHEST PAIN, UNSPECIFIED TYPE: Primary | ICD-10-CM

## 2021-07-26 DIAGNOSIS — I10 HYPERTENSION GOAL BP (BLOOD PRESSURE) < 140/90: ICD-10-CM

## 2021-07-26 DIAGNOSIS — Z86.711 HISTORY OF PULMONARY EMBOLISM: ICD-10-CM

## 2021-07-26 LAB
APTT PPP: 38 SECONDS (ref 22–38)
BASOPHILS # BLD AUTO: 0 10E3/UL (ref 0–0.2)
BASOPHILS NFR BLD AUTO: 0 %
EOSINOPHIL # BLD AUTO: 0.1 10E3/UL (ref 0–0.7)
EOSINOPHIL NFR BLD AUTO: 1 %
ERYTHROCYTE [DISTWIDTH] IN BLOOD BY AUTOMATED COUNT: 13.4 % (ref 10–15)
FIBRINOGEN PPP-MCNC: 463 MG/DL (ref 170–490)
HCT VFR BLD AUTO: 42.8 % (ref 35–47)
HGB BLD-MCNC: 14.3 G/DL (ref 11.7–15.7)
INR PPP: 1.02 (ref 0.85–1.15)
LYMPHOCYTES # BLD AUTO: 1.6 10E3/UL (ref 0.8–5.3)
LYMPHOCYTES NFR BLD AUTO: 20 %
MCH RBC QN AUTO: 32.4 PG (ref 26.5–33)
MCHC RBC AUTO-ENTMCNC: 33.4 G/DL (ref 31.5–36.5)
MCV RBC AUTO: 97 FL (ref 78–100)
MONOCYTES # BLD AUTO: 0.5 10E3/UL (ref 0–1.3)
MONOCYTES NFR BLD AUTO: 6 %
NEUTROPHILS # BLD AUTO: 5.9 10E3/UL (ref 1.6–8.3)
NEUTROPHILS NFR BLD AUTO: 73 %
PLATELET # BLD AUTO: 244 10E3/UL (ref 150–450)
RBC # BLD AUTO: 4.41 10E6/UL (ref 3.8–5.2)
WBC # BLD AUTO: 8.1 10E3/UL (ref 4–11)

## 2021-07-26 PROCEDURE — 99285 EMERGENCY DEPT VISIT HI MDM: CPT | Mod: 25 | Performed by: EMERGENCY MEDICINE

## 2021-07-26 PROCEDURE — 85730 THROMBOPLASTIN TIME PARTIAL: CPT | Performed by: NURSE PRACTITIONER

## 2021-07-26 PROCEDURE — 85025 COMPLETE CBC W/AUTO DIFF WBC: CPT | Performed by: NURSE PRACTITIONER

## 2021-07-26 PROCEDURE — 93010 ELECTROCARDIOGRAM REPORT: CPT | Mod: 77 | Performed by: EMERGENCY MEDICINE

## 2021-07-26 PROCEDURE — 99285 EMERGENCY DEPT VISIT HI MDM: CPT | Mod: 25

## 2021-07-26 PROCEDURE — 71046 X-RAY EXAM CHEST 2 VIEWS: CPT | Performed by: RADIOLOGY

## 2021-07-26 PROCEDURE — 93000 ELECTROCARDIOGRAM COMPLETE: CPT | Performed by: NURSE PRACTITIONER

## 2021-07-26 PROCEDURE — 85384 FIBRINOGEN ACTIVITY: CPT | Performed by: NURSE PRACTITIONER

## 2021-07-26 PROCEDURE — 99214 OFFICE O/P EST MOD 30 MIN: CPT | Performed by: NURSE PRACTITIONER

## 2021-07-26 PROCEDURE — 85610 PROTHROMBIN TIME: CPT | Performed by: NURSE PRACTITIONER

## 2021-07-26 PROCEDURE — 93005 ELECTROCARDIOGRAM TRACING: CPT

## 2021-07-26 PROCEDURE — 36415 COLL VENOUS BLD VENIPUNCTURE: CPT | Performed by: NURSE PRACTITIONER

## 2021-07-26 ASSESSMENT — PAIN SCALES - GENERAL: PAINLEVEL: EXTREME PAIN (9)

## 2021-07-26 ASSESSMENT — MIFFLIN-ST. JEOR: SCORE: 1782.39

## 2021-07-26 NOTE — TELEPHONE ENCOUNTER
If she has worsening symptoms, I want her to go to the ED and not wait.  The anticoagulant should help her arm pain as it gets rid of the clot. She may also use OTC acetaminophen as needed.     ABIOLA Mar, CNP

## 2021-07-26 NOTE — TELEPHONE ENCOUNTER
Spoke with significant other. Pt just went into the store. Will try calling pt again in 5 min.    Elvira Ray RN  [facility]

## 2021-07-26 NOTE — TELEPHONE ENCOUNTER
Spoke with pt. States she was seen today and forgot to ask what she can take for pain. Pain keeps her up at night. Pt states she can't take tylenol and Naproxen because of the blood thinners she is taking. Is not sure what she can take for the pain in her chest on the right side and right arm. States pain is worse in her chest right now on the right side. Feels like she's having a little SOB right now. She is not sure if it's from the heat outside. Not sure if she should go to ER. Advised that if pt is having worsening pain and SOB, should go to ER. They will make their way there, but also wanted provider's recommendation. Please advise.    Elvira Ray RN  [facility]

## 2021-07-26 NOTE — TELEPHONE ENCOUNTER
Spoke with pt. Gave her provider's message as written. Pt states she will go to the ER.    Elvira Ray RN  [facility]

## 2021-07-26 NOTE — PATIENT INSTRUCTIONS
Patient Education     Understanding Venous Thromboembolism   Venous thromboembolism is when a blood clot forms in a vein. The term refers to two linked conditions: deep vein thrombosis (DVT) and pulmonary embolism (PE). If you have DVT, a blood clot has formed in a deep vein. It often happens in a leg. Sometimes this clot can break free and travel to your lungs. Once there, it can block blood flow. This is called a pulmonary embolism or PE. It's a health emergency.    How to say it  [facility]ns-AZO-knu-lqc-egri  What causes venous thromboembolism?  A blood clot can form in a vein for many reasons. The cause may be partly your genes. For example, you may have a protein deficiency or a blood-clotting disorder. Your family history may also make you more prone to the problem.   Other things can also raise your chance for a blood clot in a vein. These include:    Major surgery, such as a joint replacement    Injury, such as a broken leg or damaged spinal cord    Cancer    Heart failure    Older age    Obesity    Smoking    Some medicines, such as birth control pills or hormone replacement therapy    Pregnancy    Long periods of not moving enough, such as after a surgery or on a long airline flight  Symptoms of venous thromboembolism  You may have no symptoms. But if you do, they can start quickly. Symptoms of DVT are:    Redness near the vein    Swelling    Pain    Changes in the color of the skin  A pulmonary embolism is when the blood clot travels to the lungs. It is a health emergency. It can cause trouble breathing, chest pain, coughing up of blood, and an irregular heartbeat. Symptoms may look like those of a heart attack. It can cause sudden death.  Treatment for venous thromboembolism  The goal of treatment is to break up any blood clots and stop new ones from forming. A pulmonary embolism is then less likely to happen. Treatment includes:     Blood thinners. These medicines may be given as a shot,  through an IV (intravenous), or as a pill. You may need to take them for a few months or longer. It depends on the cause of the blood clot.    Walking. Once your symptoms are under control, your healthcare provider may recommend that you walk. You may not be able to walk too far at first, such as after a surgery. But it will stop more blood clots from forming and help you feel better.    Compression socks. These socks or other similar devices can lower your risk for blood clots. They gently put pressure on the lower leg. This may help ease pain and swelling.    Inferior vena cava filter. This small metal device is put into your inferior vena cava. That s the main vein that runs from your legs to your heart and lungs. It catches large clots before they reach these organs. It can stop a pulmonary embolism. You may need this treatment if you aren t able to take blood thinners.    Surgery. In some cases, you may need surgery to remove a clot, especially if it has already reached the lungs.   Possible complications of venous thromboembolism    New clots    High blood pressure in the arteries to the lungs (pulmonary hypertension)    Weak valves in a vein (post-thrombotic syndrome). This can cause cramping, pain, and swelling.    Bleeding    Death    When to call your healthcare provider   Call your healthcare provider right away if you have any of these:    Fever of 100.4 F (38 C) or higher, or as directed by your healthcare provider    Redness, swelling, or fluid leaking from your incision that gets worse    Pain that gets worse    Symptoms that don t get better, or get worse    New symptoms  Ronnie last reviewed this educational content on 8/1/2020 2000-2021 [employer]. All rights reserved. This information is not intended as a substitute for professional medical care. Always follow your healthcare professional's instructions.

## 2021-07-26 NOTE — PROGRESS NOTES
Assessment & Plan     (R07.9) Chest pain, unspecified type  (primary encounter diagnosis)  Comment: Intermittent chest pain that started today. None at this time. Her CT angio from 7/24 showed a small pericardial effusion which could be the cause. ECG today: WNL. Given her personal history of DVT, PE will order additional labs to rule out other causes. Will start Xarelto given this history for 45 days. Discussed with patient that if her symptoms reoccur or worsen and she has increased SOB, chest pain, numbness or tingling in her left arm that she should seek emergent care. Patient agrees.   Plan: EKG 12-lead complete w/read - Clinics, XR Chest        2 Views            (I82.890) Superficial vein thrombosis  Comment:Ultrasound at ED showed nonocclusive superficial thrombophlebitis affecting the right cephalic vein near the antecubital fossa at the site of IV access. Anticoagulation was not started at ED. She has a history of DVT and PE so is high risk. I will start anticoagulation today and continue for at least 45 days. Referral to hematology done for possible clotting evaluation.   Plan: CBC with platelets and differential, Partial         thromboplastin time, INR, Fibrinogen activity,         Oncology/Hematology Adult Referral, rivaroxaban        ANTICOAGULANT (XARELTO) 10 MG TABS tablet            (Z86.718) Personal history of DVT (deep vein thrombosis)  Comment: increases her risk with current superficial vein thrombosis  Plan: CBC with platelets and differential, Partial         thromboplastin time, INR, Fibrinogen activity,         Oncology/Hematology Adult Referral, rivaroxaban        ANTICOAGULANT (XARELTO) 10 MG TABS tablet            (Z86.711) History of pulmonary embolism  Comment: increases her risk with current superficial vein thrombosis  Plan: CBC with platelets and differential, Partial         thromboplastin time, INR, Fibrinogen activity,         Oncology/Hematology Adult Referral,  "rivaroxaban        ANTICOAGULANT (XARELTO) 10 MG TABS tablet            (I10) Hypertension goal BP (blood pressure) < 140/90  Comment: BP elevated which could be related to pain. Pt endorses that she does not add extra salt to her food and that she has not missed any med doses. Working on lifestyle changes.   Endorses intermittent blurred vision and eye floaters; eye exam scheduled.   Plan: Continue current meds at same dose    Review of prior external note(s) from - [facility] from [facility]  reviewed  No LOS data to display   Time spent doing chart review, history and exam, documentation and further activities per the note       BMI:   Estimated body mass index is 46.43 kg/m  as calculated from the following:    Height as of this encounter: 1.6 m (5' 2.99\").    Weight as of this encounter: 118.8 kg (262 lb).     See Patient Instructions    Return in about 6 weeks (around 9/9/2021), or or sooner if symptoms worsen or fail to improve, for Follow up clinic visit superficial thrombophlebitis.      RONAK Saavedra Student  Student Nurse Practitioner   [facility]     I very much appreciated the opportunity to see and assess this patient in the clinic with NP student.  I agree with the above note which summarizes my findings and current recommendations. I have reviewed all diagnostics noted and performed physical exam. Changes were made in the body of the note to achieve one comprehensive document.    ABIOLA Lanier CNP  [facility] TOMA Dawson is a 51 year old who presents for the following health issues     HPI   Intake form reviewed.   1. Has been using her BP med's in the morning and has not missed any doses. Her home readings are around systolic 140-180 and diastiolic .  She has been eating healthier; cutting out pop, lighter food, and eating more salads.  She doesn't add extra salt to her food unless its already in her food.  " "  Has had floaters in her eyes and is getting her eyes checked out next week at the clinic.  She intermittently will get blurred vision, SOB. Today she states she felt \"chest stabbing\" on her left chest. Has numbness and tingling in right hand  Denies double vision, headaches, numbness, tingling in left arm    Has a history of  shunt and is unsure if blurred vision is due to this     2. Recently had a colonoscopy on 7/16/21. States that her right arm was used for IV during procedure.  Went to [facility] ED on 7/24 for right arm pain. Was worked up with a  CT angio with Chest impression: no pulmonary embolus; 2. Small pericardial effusion  US venous RT upper extremity doppler: 1. No DVT in the right upper extremity; 2. Positive for nonocclusive superficial thrombophlebitis affective the right cephalic vein near the antecubital fossa at the site of IV access; 3. No change in thin strand-like echogenic material within the right basilic vein, likely the residual of prior thrombus.  Chest XR:Negative  Troponin:<0.01    She has a history of previous DVT in the same arm; endorses that it was treated with heparin and coumadin.    States that her arm is warm to the touch, is painful, and erythematous.  She states that the ED did not treat her with any anticoagulants at this visit and advised her to follow up either Monday or Tuesday.    Has been off work for several months due to COVID pandemic and pinched nerve.     Hypertension Follow-up    Do you check your blood pressure regularly outside of the clinic? Yes     Are you following a low salt diet? No    Are your blood pressures ever more than 140 on the top number (systolic) OR more   than 90 on the bottom number (diastolic), for example 140/90? Yes    Hypothyroidism Follow-up      Since last visit, patient describes the following symptoms: weight loss of 7 lbs      Chronic Pain Follow-Up  Where in your body do you have pain? Right arm   How has your pain affected " "your ability to work? Not applicable  Which of these pain treatments have you tried since your last clinic visit? Heat  How well are you sleeping? Poor  How has your mood been since your last visit? Slightly worse  Have you had a significant life event? Other: ER over the weekend   Other aggravating factors: \"just hurts\"  Taking medication as directed? Yes    No flowsheet data found.  No flowsheet data found.  No flowsheet data found.  Encounter-Level CSA:    There are no encounter-level csa.     Patient-Level CSA:    There are no patient-level csa.           ED/UC Followup:    Facility:  [facility]   Date of visit: 7/24/2021  Reason for visit: Right arm pain; superficial thrombophlebitis of right upper extremity   Current Status: Patient voices sever pain today on the right arm; swelling.          Review of Systems   Constitutional, HEENT, cardiovascular, pulmonary, gi and gu systems are negative, except as otherwise noted.      Objective    BP (!) 122/90   Pulse 83   Temp 98.2  F (36.8  C) (Oral)   Resp 20   Ht 1.6 m (5' 2.99\")   Wt 118.8 kg (262 lb)   LMP 07/12/2021   SpO2 97%   BMI 46.43 kg/m    Body mass index is 46.43 kg/m .  Physical Exam   GENERAL: healthy, alert and no distress  RESP: lungs clear to auscultation - no rales, rhonchi or wheezes  CV: regular rate and rhythm, normal S1 S2, no S3 or S4, no murmur, click or rub, no peripheral edema and peripheral pulses strong  MS: decreased range of motion in right arm and RUE exam shows no erythema, induration, or nodules and pulse 3+  SKIN: no suspicious lesions or rashes  PSYCH: mentation appears normal, affect normal/bright    Study Result- Final    Narrative & Impression   CHEST TWO VIEWS   7/26/2021 12:29 PM      HISTORY: Chest pain, unspecified type.     COMPARISON: Chest x-ray 10/12/2020.                                                                      IMPRESSION: PA and lateral views of the chest. Lungs are clear. Heart  is normal in " size. No effusions are evident. No pneumothorax.     CRYSTAL ALCOCER MD

## 2021-07-27 ENCOUNTER — HOSPITAL ENCOUNTER (EMERGENCY)
Facility: CLINIC | Age: 51
Discharge: HOME | End: 2021-07-27
Attending: EMERGENCY MEDICINE | Admitting: EMERGENCY MEDICINE
Payer: COMMERCIAL

## 2021-07-27 ENCOUNTER — MYC MEDICAL ADVICE (OUTPATIENT)
Dept: FAMILY MEDICINE | Facility: CLINIC | Age: 51
End: 2021-07-27

## 2021-07-27 ENCOUNTER — APPOINTMENT (OUTPATIENT)
Dept: ULTRASOUND IMAGING | Facility: CLINIC | Age: 51
End: 2021-07-27
Attending: EMERGENCY MEDICINE
Payer: COMMERCIAL

## 2021-07-27 ENCOUNTER — TELEPHONE (OUTPATIENT)
Dept: FAMILY MEDICINE | Facility: CLINIC | Age: 51
End: 2021-07-27

## 2021-07-27 ENCOUNTER — APPOINTMENT (OUTPATIENT)
Dept: CT IMAGING | Facility: CLINIC | Age: 51
End: 2021-07-27
Attending: EMERGENCY MEDICINE
Payer: COMMERCIAL

## 2021-07-27 VITALS
WEIGHT: 262 LBS | RESPIRATION RATE: 16 BRPM | TEMPERATURE: 97.4 F | SYSTOLIC BLOOD PRESSURE: 129 MMHG | OXYGEN SATURATION: 94 % | HEART RATE: 88 BPM | DIASTOLIC BLOOD PRESSURE: 95 MMHG | HEIGHT: 62 IN | BODY MASS INDEX: 48.21 KG/M2

## 2021-07-27 DIAGNOSIS — M79.601 RIGHT UPPER LIMB PAIN: ICD-10-CM

## 2021-07-27 DIAGNOSIS — R07.89 OTHER CHEST PAIN: ICD-10-CM

## 2021-07-27 DIAGNOSIS — R91.8 PULMONARY NODULES: Primary | ICD-10-CM

## 2021-07-27 DIAGNOSIS — M25.511 ACUTE PAIN OF RIGHT SHOULDER: ICD-10-CM

## 2021-07-27 LAB
ANION GAP SERPL CALCULATED.3IONS-SCNC: 8 MMOL/L (ref 3–14)
BASOPHILS # BLD AUTO: 0.1 10E3/UL (ref 0–0.2)
BASOPHILS NFR BLD AUTO: 1 %
BUN SERPL-MCNC: 15 MG/DL (ref 7–30)
CALCIUM SERPL-MCNC: 9.1 MG/DL (ref 8.5–10.1)
CHLORIDE BLD-SCNC: 106 MMOL/L (ref 94–109)
CO2 SERPL-SCNC: 26 MMOL/L (ref 20–32)
CREAT SERPL-MCNC: 0.8 MG/DL (ref 0.52–1.04)
EOSINOPHIL # BLD AUTO: 0.2 10E3/UL (ref 0–0.7)
EOSINOPHIL NFR BLD AUTO: 2 %
ERYTHROCYTE [DISTWIDTH] IN BLOOD BY AUTOMATED COUNT: 13.4 % (ref 10–15)
GFR SERPL CREATININE-BSD FRML MDRD: 87 ML/MIN/1.73M2
GLUCOSE BLD-MCNC: 86 MG/DL (ref 70–99)
HCT VFR BLD AUTO: 42.2 % (ref 35–47)
HGB BLD-MCNC: 14.3 G/DL (ref 11.7–15.7)
HOLD SPECIMEN: NORMAL
IMM GRANULOCYTES # BLD: 0 10E3/UL
IMM GRANULOCYTES NFR BLD: 0 %
LYMPHOCYTES # BLD AUTO: 2.2 10E3/UL (ref 0.8–5.3)
LYMPHOCYTES NFR BLD AUTO: 22 %
MCH RBC QN AUTO: 32.6 PG (ref 26.5–33)
MCHC RBC AUTO-ENTMCNC: 33.9 G/DL (ref 31.5–36.5)
MCV RBC AUTO: 96 FL (ref 78–100)
MONOCYTES # BLD AUTO: 0.6 10E3/UL (ref 0–1.3)
MONOCYTES NFR BLD AUTO: 6 %
NEUTROPHILS # BLD AUTO: 7 10E3/UL (ref 1.6–8.3)
NEUTROPHILS NFR BLD AUTO: 69 %
NRBC # BLD AUTO: 0 10E3/UL
NRBC BLD AUTO-RTO: 0 /100
PLATELET # BLD AUTO: 222 10E3/UL (ref 150–450)
POTASSIUM BLD-SCNC: 3.9 MMOL/L (ref 3.4–5.3)
RBC # BLD AUTO: 4.39 10E6/UL (ref 3.8–5.2)
SODIUM SERPL-SCNC: 140 MMOL/L (ref 133–144)
TROPONIN I SERPL-MCNC: <0.015 UG/L (ref 0–0.04)
WBC # BLD AUTO: 10 10E3/UL (ref 4–11)

## 2021-07-27 PROCEDURE — 71275 CT ANGIOGRAPHY CHEST: CPT

## 2021-07-27 PROCEDURE — 93971 EXTREMITY STUDY: CPT | Mod: RT

## 2021-07-27 PROCEDURE — 80048 BASIC METABOLIC PNL TOTAL CA: CPT | Performed by: EMERGENCY MEDICINE

## 2021-07-27 PROCEDURE — 36415 COLL VENOUS BLD VENIPUNCTURE: CPT | Performed by: EMERGENCY MEDICINE

## 2021-07-27 PROCEDURE — 71275 CT ANGIOGRAPHY CHEST: CPT | Mod: 26 | Performed by: RADIOLOGY

## 2021-07-27 PROCEDURE — 250N000011 HC RX IP 250 OP 636: Performed by: EMERGENCY MEDICINE

## 2021-07-27 PROCEDURE — 84484 ASSAY OF TROPONIN QUANT: CPT | Performed by: EMERGENCY MEDICINE

## 2021-07-27 PROCEDURE — 999N000127 HC STATISTIC PERIPHERAL IV START W US GUIDANCE

## 2021-07-27 PROCEDURE — 85025 COMPLETE CBC W/AUTO DIFF WBC: CPT | Performed by: EMERGENCY MEDICINE

## 2021-07-27 PROCEDURE — 250N000013 HC RX MED GY IP 250 OP 250 PS 637: Performed by: EMERGENCY MEDICINE

## 2021-07-27 PROCEDURE — 93971 EXTREMITY STUDY: CPT | Mod: 26 | Performed by: RADIOLOGY

## 2021-07-27 RX ORDER — ACETAMINOPHEN 500 MG
1000 TABLET ORAL ONCE
Status: COMPLETED | OUTPATIENT
Start: 2021-07-27 | End: 2021-07-27

## 2021-07-27 RX ORDER — IBUPROFEN 600 MG/1
600 TABLET, FILM COATED ORAL ONCE
Status: COMPLETED | OUTPATIENT
Start: 2021-07-27 | End: 2021-07-27

## 2021-07-27 RX ORDER — IOPAMIDOL 755 MG/ML
75 INJECTION, SOLUTION INTRAVASCULAR ONCE
Status: COMPLETED | OUTPATIENT
Start: 2021-07-27 | End: 2021-07-27

## 2021-07-27 RX ADMIN — RIVAROXABAN 10 MG: 10 TABLET, FILM COATED ORAL at 05:22

## 2021-07-27 RX ADMIN — IOPAMIDOL 75 ML: 755 INJECTION, SOLUTION INTRAVENOUS at 03:48

## 2021-07-27 RX ADMIN — IBUPROFEN 600 MG: 600 TABLET, FILM COATED ORAL at 04:43

## 2021-07-27 RX ADMIN — ACETAMINOPHEN 1000 MG: 500 TABLET, FILM COATED ORAL at 04:43

## 2021-07-27 ASSESSMENT — MIFFLIN-ST. JEOR: SCORE: 1766.67

## 2021-07-27 NOTE — TELEPHONE ENCOUNTER
Patient had an appt on 07/26/21 brought form with to appt. This was filled out and faxed to [phone].  Devora Landaverde,

## 2021-07-27 NOTE — ED TRIAGE NOTES
Pt. presents to ED with R sided chest pain and R upper arm pain  Diagnosed on Saturday with blood clot in R arm  Hx of blood clots, shunt in brain  Blood thinners were not started by the ED but were prescribed today by her PCP but she has not started them  Reports some SOB  A & O x 4, independent, AVSS on RA.

## 2021-07-27 NOTE — TELEPHONE ENCOUNTER
"Pt is calling.    Was seen in the ED over the weekend on 07/24/2021, and was seen in the clinic today. Diagnosed with a blood clot. H/O DVT in the past per patient.  Per notes in chart, superficial thrombophlebitis. Supposed to start on a blood thinner. Hasn't started that yet. On her way to pick it up from the pharmacy, and she is now having chest pain, right arm pain and swelling. Pain is getting worse. Nausea, vomiting, redness on her arm as well.  Went to the ED at [facility].  Waited for 6 hours. Told that they normally don't people put on blood thinners for this kind of clot. Told she they could give her antibiotics. They gave her a script for Keflex.  She has since left there. Chest pain and arm pain is worsening.  Difficulty breathing.  I advised her to go back to the ED of go to the nearest ED now for re-evaluation.  She verbalized understanding.    Reason for Disposition    [1] Intermittent  chest pain or \"angina\" AND [2] increasing in severity or frequency  (Exception: pains lasting a few seconds)    Additional Information    Negative: Severe difficulty breathing (e.g., struggling for each breath, speaks in single words)    Negative: Difficult to awaken or acting confused (e.g., disoriented, slurred speech)    Negative: Shock suspected (e.g., cold/pale/clammy skin, too weak to stand, low BP, rapid pulse)    Negative: [1] Chest pain lasts > 5 minutes AND [2] history of heart disease  (i.e., heart attack, bypass surgery, angina, angioplasty, CHF; not just a heart murmur)    Negative: [1] Chest pain lasts > 5 minutes AND [2] described as crushing, pressure-like, or heavy    Negative: [1] Chest pain lasts > 5 minutes AND [2] age > 50    Negative: [1] Chest pain lasts > 5 minutes AND [2] age > 30 AND [3] at least one cardiac risk factor (i.e., hypertension, diabetes, obesity, smoker or strong family history of heart disease)    Negative: [1] Chest pain lasts > 5 minutes AND [2] not relieved with " nitroglycerin    Negative: Passed out (i.e., lost consciousness, collapsed and was not responding)    Negative: Heart beating < 50 beats per minute OR > 140 beats per minute    Negative: Visible sweat on face or sweat dripping down face    Negative: Sounds like a life-threatening emergency to the triager    Negative: Followed a chest injury    Negative: SEVERE chest pain    Protocols used: CHEST PAIN-A-    Mayra Callejas RN  [facility] Nurse Advisor  7/26/2021 at 9:15 PM

## 2021-07-27 NOTE — ED PROVIDER NOTES
ED Provider Note  [facility]      History     Chief Complaint   Patient presents with     Chest Pain     Arm Pain     HPI  Eunice Barrientos is a 51 year old female with PMH notable for HTN, previous DVT/PE and recently diagnosed right arm superficial vein thrombosis (started on Xarelto 7/26) who presents to the ED with chest pain and right arm pain.  Patient states pain is worsening.  She also has new onset chest pain but denies significant shortness of breath this point.  No fevers or chills.  No recent trauma.  She is able to move her arm but has some pain when she moves it.  Pain in the chest is on the right side.  Is not exertional.  No pleuritic component.  Hurts when she moves her arms.  Pain in her arms also around her right shoulder.  She is able to move it okay but it hurts when she lifts it up completely.    Per chart review patient was seen at [facility] ED on 7/24 for right arm pain.  CT angio with Chest 7/24  impression: no pulmonary embolus; 2. Small pericardial effusion  US venous RT upper extremity doppler 7/24:   1. No DVT in the right upper extremity; 2. Positive for nonocclusive superficial thrombophlebitis affective the right cephalic vein near the antecubital fossa at the site of IV access; 3. No change in thin strand-like echogenic material within the right basilic vein, likely the residual of prior thrombus.  Chest XR 7/24: Negative  Troponin:<0.01  Anticoagulation not initiated in the ED.     Patient then seen in clinic 7/26 with intermittent chest pain. Thought to be caused by small pericardial effusion found on CT angio from 7/24. ECG 7/26 was WNL. Started on Xarelto for 45 days. Patient given referral to hematology for possible clotting eval.     Past Medical History  Past Medical History:   Diagnosis Date     History of blood transfusion      History of DVT (deep vein thrombosis)     related to port     Hydrocephalus (H)      Hypertension goal BP (blood  pressure) < 140/90      Macular degeneration (senile) of retina 7/6/2017     Thyroid disease      Past Surgical History:   Procedure Laterality Date     CHOLECYSTECTOMY       COLPOSCOPY CERVIX, BIOPSY CERVIX, ENDOCERVICAL CURETTAGE, COMBINED  2009    several, earliest was 1992     FUSION LUMBAR ANTERIOR ONE LEVEL  2013     intracranial shunt       LEEP TX, CERVICAL  1995     MASTOIDECTOMY  2003     OOPHORECTOMY Left      amLODIPine (NORVASC) 2.5 MG tablet  bisacodyl (DULCOLAX) 5 MG EC tablet  Blood Pressure Monitoring (BLOOD PRESSURE CUFF) MISC  diazepam (VALIUM) 5 MG tablet  gabapentin (NEURONTIN) 100 MG capsule  hydrochlorothiazide (HYDRODIURIL) 25 MG tablet  levothyroxine (SYNTHROID/LEVOTHROID) 125 MCG tablet  levothyroxine (SYNTHROID/LEVOTHROID) 137 MCG tablet  magnesium citrate solution  melatonin 3 MG tablet  nystatin (MYCOSTATIN) 255601 UNIT/GM external powder  polyethylene glycol (GOLYTELY) 236 g suspension  Prenatal Vit-Fe Fumarate-FA (PRENATAL MULTIVITAMIN  PLUS IRON) 27-0.8 MG TABS per tablet  rivaroxaban ANTICOAGULANT (XARELTO) 10 MG TABS tablet      Allergies   Allergen Reactions     Terazol [Terconazole] Hives     Family History  Family History   Problem Relation Age of Onset     Diabetes Maternal Grandmother      Hypertension Maternal Grandmother      Cancer Maternal Aunt      Cerebrovascular Disease No family hx of      Thyroid Disease No family hx of      Glaucoma No family hx of      Macular Degeneration No family hx of      Social History   Social History     Tobacco Use     Smoking status: Never Smoker     Smokeless tobacco: Never Used   Substance Use Topics     Alcohol use: No     Drug use: No      Past medical history, past surgical history, medications, allergies, family history, and social history were reviewed with the patient. No additional pertinent items.       Review of Systems  A complete review of systems was performed with pertinent positives and negatives noted in the HPI, and all  "other systems negative.    Physical Exam   BP: (!) 132/106  Pulse: 87  Temp: 97.4  F (36.3  C)  Resp: 16  Height: 157.5 cm (5' 2\")  Weight: 118.8 kg (262 lb)  SpO2: 98 %  Physical Exam  Physical Exam   Constitutional: oriented to person, place, and time. appears well-developed and well-nourished.   HENT:   Head: Normocephalic and atraumatic.   Neck: Normal range of motion. No nuchal rigidity.  Pulmonary/Chest: Effort normal. No respiratory distress. Clear lungs bilaterally.  Cardiac: No murmurs, rubs, gallops. RRR.  Abdominal: Abdomen soft, nontender, nondistended. No rebound tenderness.  MSK: No tenderness palpation of the right humerus or shoulder, no tenderness over the clavicle.  Pain with full shoulder abduction, Neer's positive.  Radial pulse 2+ and symmetric.  Compartments are soft in the upper extremities.  Sensation is intact of the upper extremities.  Capillary refill normal in the fingers bilaterally.  Neurological: alert and oriented to person, place, and time.   Skin: Skin is warm and dry.   Psychiatric:  normal mood and affect.  behavior is normal. Thought content normal.     ED Course      Procedures            EKG Interpretation:      Interpreted by Adams Ray MD  Time reviewed: 0315  Symptoms at time of EKG: chest pain   Rhythm: normal sinus   Rate: Normal  Axis: Normal  Ectopy: none  Conduction: normal  ST Segments/ T Waves: TWI in lead III, stable  Q Waves: nonspecific  Comparison to prior: Unchanged    Clinical Impression: no acute changes    Results for orders placed or performed during the hospital encounter of 07/27/21   US Upper Extremity Venous Duplex Right     Status: None (Preliminary result)    Impression    RESIDENT PRELIMINARY INTERPRETATION  IMPRESSION:  1.  No evidence of right upper extremity deep venous thrombosis.   CT Chest Pulmonary Embolism w Contrast     Status: None    Narrative    EXAM: CT CHEST PULMONARY EMBOLISM W CONTRAST  LOCATION: [facility]  DATE/TIME: 7/27/2021 3:47 AM    INDICATION: Chest pain.  COMPARISON: 4/23/2021 and 10/5/2020.  TECHNIQUE: CT chest pulmonary angiogram during arterial phase injection of IV contrast. Multiplanar reformats and MIP reconstructions were performed. Dose reduction techniques were used.   CONTRAST: 75 mL Isovue 370.     FINDINGS:  ANGIOGRAM CHEST: Pulmonary arteries are normal caliber and negative for pulmonary emboli. Thoracic aorta is negative for dissection. No CT evidence of right heart strain.    LUNGS AND PLEURA: No acute infiltrates or consolidation. Indeterminate 5 mm pulmonary nodule in the right midlung anteriorly on series 8 image 129 is unchanged. No pleural effusions.    MEDIASTINUM/AXILLAE: No adenopathy. Small pericardial effusion not significantly changed. Normal caliber thoracic aorta. Esophagus is grossly negative.    CORONARY ARTERY CALCIFICATION: None.    UPPER ABDOMEN: Gallbladder is absent. Indeterminate low-attenuation lesion in the right hepatic lobe posteriorly measuring about 3 cm in diameter. This may represent a hemangioma but is incompletely characterized on this study. This did also appear to be   present previously. Incidental small gastric diverticulum left upper quadrant.    MUSCULOSKELETAL: Hypertrophic/degenerative changes thoracic spine.      Impression    IMPRESSION:  1.  Negative for pulmonary embolism.    2.  No evidence for pneumonitis.    3.  Indeterminate 5 mm nodule in the right lung is unchanged. If there are risk factors present such as history of smoking, follow-up CT in one year would be recommended. If no risk factors are present, no specific follow-up needed.    4.  Small pericardial effusion of uncertain significance. This is unchanged compared to the prior study.    5.  Indeterminate 3 cm low-attenuation lesion right hepatic lobe posteriorly. This may represent a hemangioma but is incompletely characterized on this study. This lesion was also  present previously. Suggest follow-up ultrasound for attempted better   characterization.                 Basic metabolic panel     Status: Normal   Result Value Ref Range    Sodium 140 133 - 144 mmol/L    Potassium 3.9 3.4 - 5.3 mmol/L    Chloride 106 94 - 109 mmol/L    Carbon Dioxide (CO2) 26 20 - 32 mmol/L    Anion Gap 8 3 - 14 mmol/L    Urea Nitrogen 15 7 - 30 mg/dL    Creatinine 0.80 0.52 - 1.04 mg/dL    Calcium 9.1 8.5 - 10.1 mg/dL    Glucose 86 70 - 99 mg/dL    GFR Estimate 87 >60 mL/min/1.73m2   Troponin I     Status: Normal   Result Value Ref Range    Troponin I <0.015 0.000 - 0.045 ug/L   CBC with platelets and differential     Status: None   Result Value Ref Range    WBC Count 10.0 4.0 - 11.0 10e3/uL    RBC Count 4.39 3.80 - 5.20 10e6/uL    Hemoglobin 14.3 11.7 - 15.7 g/dL    Hematocrit 42.2 35.0 - 47.0 %    MCV 96 78 - 100 fL    MCH 32.6 26.5 - 33.0 pg    MCHC 33.9 31.5 - 36.5 g/dL    RDW 13.4 10.0 - 15.0 %    Platelet Count 222 150 - 450 10e3/uL    % Neutrophils 69 %    % Lymphocytes 22 %    % Monocytes 6 %    % Eosinophils 2 %    % Basophils 1 %    % Immature Granulocytes 0 %    NRBCs per 100 WBC 0 <1 /100    Absolute Neutrophils 7.0 1.6 - 8.3 10e3/uL    Absolute Lymphocytes 2.2 0.8 - 5.3 10e3/uL    Absolute Monocytes 0.6 0.0 - 1.3 10e3/uL    Absolute Eosinophils 0.2 0.0 - 0.7 10e3/uL    Absolute Basophils 0.1 0.0 - 0.2 10e3/uL    Absolute Immature Granulocytes 0.0 <=0.0 10e3/uL    Absolute NRBCs 0.0 10e3/uL   Extra Blue Top Tube     Status: None   Result Value Ref Range    Hold Specimen JI    EKG 12 lead     Status: None (Preliminary result)   Result Value Ref Range    Systolic Blood Pressure  mmHg    Diastolic Blood Pressure  mmHg    Ventricular Rate 82 BPM    Atrial Rate 82 BPM    WY Interval 180 ms    QRS Duration 66 ms     ms    QTc 415 ms    P Axis 54 degrees    R AXIS -4 degrees    T Axis -10 degrees    Interpretation ECG       Sinus rhythm  Minimal voltage criteria for LVH, may be normal  variant  Anteroseptal infarct , age undetermined  Abnormal ECG     CBC with Platelets & Differential     Status: None    Narrative    The following orders were created for panel order CBC with Platelets & Differential.  Procedure                               Abnormality         Status                     ---------                               -----------         ------                     CBC with platelets and d...[[id number]]                      Final result                 Please view results for these tests on the individual orders.   Mabel Draw     Status: None (In process)    Narrative    The following orders were created for panel order Mabel Draw.  Procedure                               Abnormality         Status                     ---------                               -----------         ------                     Extra Blue Top Tube[[id number]]                              Final result               Extra Red Top Tube[[id number]]                                                            Please view results for these tests on the individual orders.          No results found for any visits on 07/26/21.  Medications - No data to display     Assessments & Plan (with Medical Decision Making)   MDM  Patient is presenting with right arm pain and chest pain.  Is been going on for over 1 day, troponin is negative and EKG does not show acute signs of ischemia infarction, unlikely ACS.  CT again shows no pulmonary embolism.  Ultrasound is otherwise reassuring for large DVT.  There is no signs of septic arthritis of the shoulder.  She has had no trauma so very likely fracture, will not x-ray, this should have been seen on the CT if she had a fracture.  She does have some signs of impingement or possible rotator cuff injury.  She otherwise able to move her shoulder okay but with some pain with maximum range of motion.  She is given a dose of her Xarelto as she has not been on this and she should be, she is  unsure if she will get this today.  I strongly encouraged her to do this and to take it as directed.  The patient will follow up with a primary care provider regarding her shoulder.    I have reviewed the nursing notes. I have reviewed the findings, diagnosis, plan and need for follow up with the patient.    New Prescriptions    No medications on file       Final diagnoses:   Acute pain of right shoulder       --  Adams Ray MD    [facility] EMERGENCY DEPARTMENT  7/26/2021     Adams Ray MD  07/27/21 0507

## 2021-07-27 NOTE — DISCHARGE INSTRUCTIONS
Please make an appointment to follow up with Your Primary Care Provider as soon as possible to discuss your shoulder pain    Return to the emergency department if you develop worsening shortness of breath, chest pain or if you have any further concerns.    Take your Xarelto when you fill it.  We are giving you a dose today so you can wait until tomorrow evening to start it normally with dinner.    Use ice and Tylenol for your pain.

## 2021-07-28 ENCOUNTER — MYC MEDICAL ADVICE (OUTPATIENT)
Dept: FAMILY MEDICINE | Facility: CLINIC | Age: 51
End: 2021-07-28

## 2021-07-28 LAB
ATRIAL RATE - MUSE: 82 BPM
DIASTOLIC BLOOD PRESSURE - MUSE: NORMAL MMHG
INTERPRETATION ECG - MUSE: NORMAL
P AXIS - MUSE: 54 DEGREES
PR INTERVAL - MUSE: 180 MS
QRS DURATION - MUSE: 66 MS
QT - MUSE: 356 MS
QTC - MUSE: 415 MS
R AXIS - MUSE: -4 DEGREES
SYSTOLIC BLOOD PRESSURE - MUSE: NORMAL MMHG
T AXIS - MUSE: -10 DEGREES
VENTRICULAR RATE- MUSE: 82 BPM

## 2021-07-28 NOTE — TELEPHONE ENCOUNTER
Patient called very anxious and upset about results and does not know why she was released from the hospital with an abnormal ECG.     Patient says she has a history of blood clots, was told she has a 5 mm nodule in her lung and is still having chest and shoulder pain and is very afraid. Patient says that does not think she is being care for appropriately.  Quick chart review shows patient with history of intracranial shunt, pulmonary embolism, hypertension, morbid obesity BMI 47+.    RN offered options, appointment or follow up message with request for a referral. Patient was not happy with any of those solutions and was then advised only other option was to follow up in ED. Patient states that she believes the best option is the ED and wants it noted that she is going to the ED at [facility].     Kiara Obando RN on 7/28/2021 at 1:46 PM    Patient mentions ECG  Last Resulted: 07/28/21  Sinus rhythm   Minimal voltage criteria for LVH, may be normal variant   Anteroseptal infarct , age undetermined   Abnormal ECG    Patient also mentions CXR  CT chest pulmonary angiogram during arterial phase injection of IV contrast  IMPRESSION:  1.  Negative for pulmonary embolism.  2.  No evidence for pneumonitis.  3.  Indeterminate 5 mm nodule in the right lung is unchanged. If there are risk factors present such as history of smoking, follow-up CT in one year would be recommended. If no risk factors are present, no specific follow-up needed.   4.  Small pericardial effusion of uncertain significance. This is unchanged compared to the prior study.  5.  Indeterminate 3 cm low-attenuation lesion right hepatic lobe posteriorly. This may represent a hemangioma but is incompletely characterized on this study. This lesion was also present previously. Suggest follow-up ultrasound for attempted better   characterization.

## 2021-08-02 ENCOUNTER — NURSE TRIAGE (OUTPATIENT)
Dept: FAMILY MEDICINE | Facility: CLINIC | Age: 51
End: 2021-08-02

## 2021-08-02 NOTE — TELEPHONE ENCOUNTER
Received follow up call from patient. She states that her chest is starting to get itchy and she is seeing red bumps which she believes is related to Xarelto (see ER note 07/31/2021 and note below). She is also experiencing shortness of breath which has been going on for a few days-- feels it's due to air quality. She states she will not go to ER but just wants to switch off of the Xarelto and start taking an injectable anticoagulant instead. Writer advised patient to be seen in Urgent care or ER but will also route to provider to please review.    Reason for Disposition    Fever and localized purple or blood-colored spots or dots that are not from injury or friction    Additional Information    Negative: Sounds like a life-threatening emergency to the triager    Negative: Possible contact with poison ivy or oak    Negative: Insect bite(s) suspected    Negative: Athlete's Foot suspected (i.e., itchy rash between the toes)    Negative: Jock Itch suspected (i.e., itchy rash on inner thighs near genital area)    Negative: Wound infection suspected (i.e., pain, spreading redness, or pus; in a cut, puncture, scrape or sutured wound)    Negative: Rash of external female genital area (vulva)    Negative: Rash of penis or scrotum    Negative: Small spot, skin growth, or mole    Protocols used: RASH OR REDNESS - [id number]    KENDELL Hodges RN  [facility]

## 2021-08-02 NOTE — TELEPHONE ENCOUNTER
Received call from patient. She stated that she was in the ER 07/31/2021 for chest pain and shortness of breath. Cardiac workup was negative for heart attack, however she was told that her symptoms could be related to rivaroxaban ANTICOAGULANT (XARELTO) 10 MG TABS tablet. She is wondering if she could be prescribed an alternate medication. Routing to provider to please review and advise. Patient has declined ER f/u visit with PCP.    DALTON HodgesN JUVENTINO  [facility]

## 2021-08-02 NOTE — TELEPHONE ENCOUNTER
She needs to be seen somewhere to check platletes and other markers before switching   Urgent care would be appropriate

## 2021-08-02 NOTE — TELEPHONE ENCOUNTER
Called and spoke with pt and informed of message from Dr. Clayton. She acknowledged and asked whether or not to take her dose of xarelto tonight. Writer informed pt Dr. Clayton did not mention whether to take or hold medication therefore writer cannot make any recommendations to patient. Pt acknowledged.     Pt did not confirm whether she would go to urgent care or not at this time but understands that is the recommendation.    Stephanie BROWN RN, BSN  [facility]

## 2021-08-10 ENCOUNTER — HOSPITAL ENCOUNTER (OUTPATIENT)
Facility: AMBULATORY SURGERY CENTER | Age: 51
End: 2021-08-10
Attending: ANESTHESIOLOGY
Payer: COMMERCIAL

## 2021-08-11 DIAGNOSIS — I10 HYPERTENSION GOAL BP (BLOOD PRESSURE) < 140/90: ICD-10-CM

## 2021-08-11 DIAGNOSIS — B37.2 CANDIDIASIS, INTERTRIGO: ICD-10-CM

## 2021-08-13 RX ORDER — NYSTATIN 100000 [USP'U]/G
POWDER TOPICAL
Qty: 120 G | Refills: 0 | OUTPATIENT
Start: 2021-08-13

## 2021-08-16 NOTE — TELEPHONE ENCOUNTER
Contacted pt and verified that she is still being seen at  clinic. Has an appointment with Ya Ortiz on 9/8/21 at 11:20am.  Maria Guadalupe Cole-  [place]

## 2021-08-17 NOTE — TELEPHONE ENCOUNTER
Routing refill request to provider for review/approval because:  BP Readings from Last 3 Encounters:   07/27/21 (!) 129/95   07/26/21 (!) 122/90   07/13/21 (!) 162/96

## 2021-08-20 RX ORDER — AMLODIPINE BESYLATE 2.5 MG/1
TABLET ORAL
Qty: 30 TABLET | Refills: 0 | Status: SHIPPED | OUTPATIENT
Start: 2021-08-20

## 2021-08-27 ENCOUNTER — TELEPHONE (OUTPATIENT)
Dept: GASTROENTEROLOGY | Facility: OUTPATIENT CENTER | Age: 51
End: 2021-08-27

## 2021-08-27 DIAGNOSIS — Z11.59 ENCOUNTER FOR SCREENING FOR OTHER VIRAL DISEASES: ICD-10-CM

## 2021-08-27 NOTE — TELEPHONE ENCOUNTER
"Screening Questions  1. Are you active on mychart?y    2. What insurance is in the chart?     2.  Ordering/Referring Provider:     3. BMI : 5'2\"/180=32.9    4. Are you on daily home oxygen? n    5. Do you have a history of difficult airway? n    6. Have you had a heart, lung, or liver transplant? n    7. Are you currently on dialysis? n    8. Have you had a stroke or Transient ischemic atttack (TIA) within 6 months? n    9. In the past 6 months, have you had any heart related issues including cardiomyopathy or heart attack?         If yes, did it require cardiac stenting or other implantable device?N    10. Do you have any implantable devices in your body (pacemaker, defib, LVAD)? N    11. Do you take nitroglycerin? If yes, how often? N    12. Are you currently taking any blood thinners?Y-     13. Are you a diabetic? N    14. (Females) Are you currently pregnant? N  If yes, how many weeks?    15. Have you had a procedure in the past that was difficult to tolerate with conscious sedation? Any allergies to Fentanyl or Versed N    16. Are you taking any scheduled prescription narcotics more than once daily? N    17. Do you have any chemical dependencies such as alcohol, street drugs, or methadone? N    18. Do you have any history of post-traumatic stress syndrome or mental health issues? N    19. Do you transfer independently? Y    20.  Do you have any issues with constipation? :     21. Preferred Pharmacy for Pre Prescription     Scheduling Details    Which Colonoscopy Prep was Sent?: N/A  Procedure Scheduled: EGD-MAC  Provider/Surgeon: franc  Date of Procedure:9/10  Location: [facility]  Caller (Please ask for phone number if not scheduled by patient): PATIENT      Sedation Type: MAC  Conscious Sedation- Needs  for 6 hours after the procedure  MAC/General-Needs  for 24 hours after procedure    Pre-op Required at [facility], [place], [place] and OR for MAC sedation:   (if yes advise patient they will need " a pre-op prior to procedure)      Is patient on blood thinners? -Y (If yes- inform patient to follow up with PCP or provider for follow up instructions)     Informed patient they will need an adult  Y  Cannot take any type of public or medical transportation alone    Informed Patient of COVID Test Requirement Y    Confirmed Nurse will call to complete assessment Y    Additional comments:

## 2021-09-01 ENCOUNTER — TELEPHONE (OUTPATIENT)
Dept: GASTROENTEROLOGY | Facility: OUTPATIENT CENTER | Age: 51
End: 2021-09-01

## 2021-09-01 NOTE — TELEPHONE ENCOUNTER
Is patient taking blood thinners/antiplatelet medications? Warfarin. Instructed patient to contact her provider about stopping 5 days prior to exam    Heart Disease? Denies     Lung Disease? Denies     Sleep Apnea? Denies     Diabetic? Denies     Kidney Disease? Denies     Electronic Implantable Devices? Denies     PTSD? N/a    Prep instructions reviewed with patient? Instructions,  policy, MAC sedation plan reviewed. Instructed patient to have someone stay with them for 24 hours post exam    : Yes    Pharmacy: N/a    Indication for Procedure: Hematochezia; Abdominal pain, generalized    Referring Provider: Kiara Quintana APRN CNP    Arrival Time: 10 AM    COVID test? 9-8 M [facility]    Alisha Singleton RN

## 2021-09-02 NOTE — NURSING NOTE
RN read through the instructions with the patient for the recommended procedure: Caudal Epidural Injection  Patient verbalized understanding to holding appropriate medication per protocol and was agreeable to NPO policy and needing a .    Anticoagulant: None reported    Recommended Follow Up: 4 weeks post procedure    Jess Stanley RN  
Chasity Bardales)  Radiology General  [address] [place] [address]  Phone: [phone]  Fax: [phone]  Scheduled Appointment: 09/30/2021 10:00 AM    Adi Higuera)  OBSN  General  [address] [place] [address], [address] [place] [address]  Phone: [phone]  Fax: [phone]  Established Patient  Scheduled Appointment: 09/29/2021 07:30 AM

## 2021-09-05 ENCOUNTER — HEALTH MAINTENANCE LETTER (OUTPATIENT)
Age: 51
End: 2021-09-05

## 2021-09-13 ENCOUNTER — TELEPHONE (OUTPATIENT)
Dept: GASTROENTEROLOGY | Facility: CLINIC | Age: 51
End: 2021-09-13

## 2021-09-13 NOTE — TELEPHONE ENCOUNTER
"Caller: Eunice Barrientos    Procedure: EGD    Date of Procedure Cancelled: 9/10/2021    Ordering Provider:Pippa    Reason for cancel: \"My daughter is in hospital and I have to pick her up tomorrow she has ms and lupus\"      Any Staff messages sent regarding case?: YES                  Recipient and Sender: Edison robles & Eunice Barrientos*                  Message Details: \"My daughter is in hospital and I have to pick her up tomorrow she has ms and lupus\"      Rescheduled: yes     If rescheduled:    Date: 9/24/2021   Location: [place]   Note any change or update to original order/sedation: no                    "

## 2021-09-20 ENCOUNTER — LAB (OUTPATIENT)
Dept: LAB | Facility: CLINIC | Age: 51
End: 2021-09-20
Payer: COMMERCIAL

## 2021-09-20 DIAGNOSIS — Z11.59 ENCOUNTER FOR SCREENING FOR OTHER VIRAL DISEASES: ICD-10-CM

## 2021-09-20 PROCEDURE — U0005 INFEC AGEN DETEC AMPLI PROBE: HCPCS

## 2021-09-20 PROCEDURE — U0003 INFECTIOUS AGENT DETECTION BY NUCLEIC ACID (DNA OR RNA); SEVERE ACUTE RESPIRATORY SYNDROME CORONAVIRUS 2 (SARS-COV-2) (CORONAVIRUS DISEASE [COVID-19]), AMPLIFIED PROBE TECHNIQUE, MAKING USE OF HIGH THROUGHPUT TECHNOLOGIES AS DESCRIBED BY CMS-2020-01-R: HCPCS

## 2021-09-21 LAB — SARS-COV-2 RNA RESP QL NAA+PROBE: NEGATIVE

## 2022-04-17 ENCOUNTER — HEALTH MAINTENANCE LETTER (OUTPATIENT)
Age: 52
End: 2022-04-17

## 2022-04-21 ENCOUNTER — TELEPHONE (OUTPATIENT)
Dept: FAMILY MEDICINE | Facility: CLINIC | Age: 52
End: 2022-04-21
Payer: COMMERCIAL

## 2022-04-21 NOTE — TELEPHONE ENCOUNTER
Patient Quality Outreach    Patient is due for the following:   Cervical Cancer Screening - PAP Needed    NEXT STEPS:   Chart routed to abstraction.      Type of outreach:    Chart review performed, no outreach needed.    Pap smear done by this group [facility] on this date: 12/21/21 and HPV done by this group    [facility] on this date: 12/21/21      Questions for provider review:    None     Sari Cruz MA  Chart routed to abstraction.

## 2022-05-04 DIAGNOSIS — B37.2 CANDIDIASIS, INTERTRIGO: ICD-10-CM

## 2022-05-06 RX ORDER — NYSTATIN 100000 [USP'U]/G
POWDER TOPICAL
Qty: 120 G | Refills: 0 | OUTPATIENT
Start: 2022-05-06

## 2022-05-06 NOTE — TELEPHONE ENCOUNTER
Patient now seen by   Ya Li PA-C    [address] [place] [id number]    Phone: [phone]    Fax: [phone]  Prescription denied, should be prescribed by PCP

## 2022-05-09 ENCOUNTER — MEDICAL CORRESPONDENCE (OUTPATIENT)
Dept: HEALTH INFORMATION MANAGEMENT | Facility: CLINIC | Age: 52
End: 2022-05-09

## 2022-05-09 ENCOUNTER — OFFICE VISIT (OUTPATIENT)
Dept: OPHTHALMOLOGY | Facility: CLINIC | Age: 52
End: 2022-05-09
Payer: COMMERCIAL

## 2022-05-09 DIAGNOSIS — H35.3111 EARLY DRY STAGE NONEXUDATIVE AGE-RELATED MACULAR DEGENERATION OF RIGHT EYE: ICD-10-CM

## 2022-05-09 DIAGNOSIS — H53.9 VISUAL DISTURBANCE: Primary | ICD-10-CM

## 2022-05-09 DIAGNOSIS — H35.3124 ADVANCED ATROPHIC NONEXUDATIVE AGE-RELATED MACULAR DEGENERATION OF LEFT EYE WITH SUBFOVEAL INVOLVEMENT: ICD-10-CM

## 2022-05-09 PROCEDURE — 92002 INTRM OPH EXAM NEW PATIENT: CPT | Performed by: OPHTHALMOLOGY

## 2022-05-09 ASSESSMENT — CONF VISUAL FIELD
OD_NORMAL: 1
OS_NORMAL: 1

## 2022-05-09 ASSESSMENT — SLIT LAMP EXAM - LIDS
COMMENTS: NORMAL
COMMENTS: NORMAL

## 2022-05-09 ASSESSMENT — VISUAL ACUITY
CORRECTION_TYPE: GLASSES
OD_CC: 20/25
OS_CC: HM
METHOD: SNELLEN - LINEAR
OD_CC+: +2

## 2022-05-09 ASSESSMENT — REFRACTION_WEARINGRX
SPECS_TYPE: SVL
OD_SPHERE: -3.75
OS_AXIS: 143
OS_SPHERE: -3.75
OD_CYLINDER: +1.75
OS_CYLINDER: +1.75
OD_AXIS: 180

## 2022-05-09 ASSESSMENT — CUP TO DISC RATIO
OD_RATIO: 0.0
OS_RATIO: 0.2

## 2022-05-09 ASSESSMENT — TONOMETRY
OD_IOP_MMHG: 13
IOP_METHOD: APPLANATION
OS_IOP_MMHG: 13

## 2022-05-09 ASSESSMENT — EXTERNAL EXAM - RIGHT EYE: OD_EXAM: NORMAL

## 2022-05-09 ASSESSMENT — EXTERNAL EXAM - LEFT EYE: OS_EXAM: NORMAL

## 2022-05-09 NOTE — PATIENT INSTRUCTIONS
May use artificial tears up to 4 times daily both eyes. (Refresh Tears, Systane Ultra/Balance, or Theratears)   Signs and symptoms of retinal detachment (shower of black floaters, frequent flashing even during day, curtain over part of visual field) discussed.   Will obtain an OCT today - I will call if any concerns   Schedule visit with Dr. Gordon at [facility].  Matt Rodarte M.D.  [phone]

## 2022-05-09 NOTE — LETTER
"    5/9/2022         RE: Eunice Barrientos  [address]  [place] [address]        Dear Colleague,    Thank you for referring your patient, Eunice Barrientos, to the [facility]. Please see a copy of my visit note below.     Current Eye Medications:  She is taking a multiple vitamin.     Subjective:  Patient states for the last 2 weeks she has black blobs in her vision. Also like a shadow when she is looking around the room. \"sometimes it seems like a mouse running across the room.\" No flashing lights, and no curtains. She states she thinks this is in her right eye. Very poor vision in her left eye.  She also see's a doctor at  [facility] in [place] for macular degeneration.  This is causing her to feel unbalanced.    Saw WP last year or year before.  No increased ICP with head CT in OR 2 wks ago; shunt okay.     Objective:  See Ophthalmology Exam.       Assessment:  Visual disturbance in better seeing right eye of indeterminate etiology.      Plan:  May use artificial tears up to 4 times daily both eyes. (Refresh Tears, Systane Ultra/Balance, or Theratears)   Signs and symptoms of retinal detachment (shower of black floaters, frequent flashing even during day, curtain over part of visual field) discussed.   Will obtain an OCT today - I will call if any concerns   Schedule visit with Dr. Gordon at [facility].  Matt Rodarte M.D.  [phone]          Again, thank you for allowing me to participate in the care of your patient.        Sincerely,        Matt Rodarte MD    "

## 2022-05-09 NOTE — PROGRESS NOTES
" Current Eye Medications:  She is taking a multiple vitamin.     Subjective:  Patient states for the last 2 weeks she has black blobs in her vision. Also like a shadow when she is looking around the room. \"sometimes it seems like a mouse running across the room.\" No flashing lights, and no curtains. She states she thinks this is in her right eye. Very poor vision in her left eye.  She also see's a doctor at  [facility] in [place] for macular degeneration.  This is causing her to feel unbalanced.    Saw WP last year or year before.  No increased ICP with head CT in OR 2 wks ago; shunt okay.     Objective:  See Ophthalmology Exam.       Assessment:  Visual disturbance in better seeing right eye of indeterminate etiology.      Plan:  May use artificial tears up to 4 times daily both eyes. (Refresh Tears, Systane Ultra/Balance, or Theratears)   Signs and symptoms of retinal detachment (shower of black floaters, frequent flashing even during day, curtain over part of visual field) discussed.   Will obtain an OCT today - I will call if any concerns   Schedule visit with Dr. Gordon at [facility].  Matt Rodarte M.D.  [phone]      "

## 2022-05-11 ENCOUNTER — TRANSCRIBE ORDERS (OUTPATIENT)
Dept: OTHER | Age: 52
End: 2022-05-11
Payer: COMMERCIAL

## 2022-05-11 DIAGNOSIS — H26.9 CATARACT OF BOTH EYES, UNSPECIFIED CATARACT TYPE: Primary | ICD-10-CM

## 2022-07-04 PROBLEM — H35.3124 ADVANCED ATROPHIC NONEXUDATIVE AGE-RELATED MACULAR DEGENERATION OF LEFT EYE WITH SUBFOVEAL INVOLVEMENT: Status: ACTIVE | Noted: 2022-07-04

## 2022-07-04 PROBLEM — H35.3111 EARLY DRY STAGE NONEXUDATIVE AGE-RELATED MACULAR DEGENERATION OF RIGHT EYE: Status: ACTIVE | Noted: 2022-07-04

## 2022-07-20 ENCOUNTER — DOCUMENTATION ONLY (OUTPATIENT)
Dept: OPHTHALMOLOGY | Facility: CLINIC | Age: 52
End: 2022-07-20

## 2022-10-23 ENCOUNTER — HEALTH MAINTENANCE LETTER (OUTPATIENT)
Age: 52
End: 2022-10-23

## 2022-11-27 ENCOUNTER — HOSPITAL ENCOUNTER (EMERGENCY)
Facility: CLINIC | Age: 52
Discharge: HOME | End: 2022-11-27
Attending: EMERGENCY MEDICINE | Admitting: EMERGENCY MEDICINE
Payer: COMMERCIAL

## 2022-11-27 VITALS
OXYGEN SATURATION: 98 % | SYSTOLIC BLOOD PRESSURE: 148 MMHG | HEIGHT: 62 IN | WEIGHT: 170 LBS | HEART RATE: 88 BPM | TEMPERATURE: 98.5 F | DIASTOLIC BLOOD PRESSURE: 97 MMHG | BODY MASS INDEX: 31.28 KG/M2

## 2022-11-27 DIAGNOSIS — R06.02 SHORTNESS OF BREATH: ICD-10-CM

## 2022-11-27 DIAGNOSIS — R91.8 PULMONARY NODULES: ICD-10-CM

## 2022-11-27 LAB
ALBUMIN SERPL BCG-MCNC: 4.2 G/DL (ref 3.5–5.2)
ALBUMIN UR-MCNC: 10 MG/DL
ALP SERPL-CCNC: 88 U/L (ref 35–104)
ALT SERPL W P-5'-P-CCNC: 31 U/L (ref 10–35)
ANION GAP SERPL CALCULATED.3IONS-SCNC: 11 MMOL/L (ref 7–15)
APPEARANCE UR: ABNORMAL
AST SERPL W P-5'-P-CCNC: 22 U/L (ref 10–35)
ATRIAL RATE - MUSE: 82 BPM
BASOPHILS # BLD AUTO: 0 10E3/UL (ref 0–0.2)
BASOPHILS NFR BLD AUTO: 1 %
BILIRUB SERPL-MCNC: 0.4 MG/DL
BILIRUB UR QL STRIP: NEGATIVE
BUN SERPL-MCNC: 13.9 MG/DL (ref 6–20)
CALCIUM SERPL-MCNC: 9.4 MG/DL (ref 8.6–10)
CHLORIDE SERPL-SCNC: 103 MMOL/L (ref 98–107)
COLOR UR AUTO: YELLOW
CREAT SERPL-MCNC: 0.82 MG/DL (ref 0.51–0.95)
DEPRECATED HCO3 PLAS-SCNC: 25 MMOL/L (ref 22–29)
DIASTOLIC BLOOD PRESSURE - MUSE: NORMAL MMHG
EOSINOPHIL # BLD AUTO: 0.3 10E3/UL (ref 0–0.7)
EOSINOPHIL NFR BLD AUTO: 4 %
ERYTHROCYTE [DISTWIDTH] IN BLOOD BY AUTOMATED COUNT: 14.1 % (ref 10–15)
FLUAV RNA SPEC QL NAA+PROBE: NEGATIVE
FLUBV RNA RESP QL NAA+PROBE: NEGATIVE
GFR SERPL CREATININE-BSD FRML MDRD: 87 ML/MIN/1.73M2
GLUCOSE SERPL-MCNC: 94 MG/DL (ref 70–99)
GLUCOSE UR STRIP-MCNC: NEGATIVE MG/DL
HCT VFR BLD AUTO: 39.5 % (ref 35–47)
HGB BLD-MCNC: 13 G/DL (ref 11.7–15.7)
HGB UR QL STRIP: NEGATIVE
IMM GRANULOCYTES # BLD: 0 10E3/UL
IMM GRANULOCYTES NFR BLD: 0 %
INR PPP: 2.11 (ref 0.85–1.15)
INTERPRETATION ECG - MUSE: NORMAL
KETONES UR STRIP-MCNC: NEGATIVE MG/DL
LEUKOCYTE ESTERASE UR QL STRIP: NEGATIVE
LYMPHOCYTES # BLD AUTO: 1.5 10E3/UL (ref 0.8–5.3)
LYMPHOCYTES NFR BLD AUTO: 21 %
MCH RBC QN AUTO: 31.7 PG (ref 26.5–33)
MCHC RBC AUTO-ENTMCNC: 32.9 G/DL (ref 31.5–36.5)
MCV RBC AUTO: 96 FL (ref 78–100)
MONOCYTES # BLD AUTO: 0.5 10E3/UL (ref 0–1.3)
MONOCYTES NFR BLD AUTO: 7 %
MUCOUS THREADS #/AREA URNS LPF: PRESENT /LPF
NEUTROPHILS # BLD AUTO: 4.8 10E3/UL (ref 1.6–8.3)
NEUTROPHILS NFR BLD AUTO: 67 %
NITRATE UR QL: NEGATIVE
NRBC # BLD AUTO: 0 10E3/UL
NRBC BLD AUTO-RTO: 0 /100
NT-PROBNP SERPL-MCNC: 19 PG/ML (ref 0–900)
P AXIS - MUSE: 46 DEGREES
PH UR STRIP: 6.5 [PH] (ref 5–7)
PLATELET # BLD AUTO: 246 10E3/UL (ref 150–450)
POTASSIUM SERPL-SCNC: 4.4 MMOL/L (ref 3.4–5.3)
PR INTERVAL - MUSE: 172 MS
PROT SERPL-MCNC: 7.1 G/DL (ref 6.4–8.3)
QRS DURATION - MUSE: 84 MS
QT - MUSE: 368 MS
QTC - MUSE: 429 MS
R AXIS - MUSE: -6 DEGREES
RBC # BLD AUTO: 4.1 10E6/UL (ref 3.8–5.2)
RBC URINE: 2 /HPF
RSV RNA SPEC NAA+PROBE: NEGATIVE
SARS-COV-2 RNA RESP QL NAA+PROBE: NEGATIVE
SODIUM SERPL-SCNC: 139 MMOL/L (ref 136–145)
SP GR UR STRIP: 1.02 (ref 1–1.03)
SQUAMOUS EPITHELIAL: 23 /HPF
SYSTOLIC BLOOD PRESSURE - MUSE: NORMAL MMHG
T AXIS - MUSE: 16 DEGREES
TROPONIN T SERPL HS-MCNC: <6 NG/L
UROBILINOGEN UR STRIP-MCNC: NORMAL MG/DL
VENTRICULAR RATE- MUSE: 82 BPM
WBC # BLD AUTO: 7 10E3/UL (ref 4–11)
WBC URINE: 2 /HPF

## 2022-11-27 PROCEDURE — C9803 HOPD COVID-19 SPEC COLLECT: HCPCS | Performed by: EMERGENCY MEDICINE

## 2022-11-27 PROCEDURE — 93005 ELECTROCARDIOGRAM TRACING: CPT | Performed by: EMERGENCY MEDICINE

## 2022-11-27 PROCEDURE — 83880 ASSAY OF NATRIURETIC PEPTIDE: CPT | Performed by: EMERGENCY MEDICINE

## 2022-11-27 PROCEDURE — 85610 PROTHROMBIN TIME: CPT | Performed by: EMERGENCY MEDICINE

## 2022-11-27 PROCEDURE — 87637 SARSCOV2&INF A&B&RSV AMP PRB: CPT | Performed by: EMERGENCY MEDICINE

## 2022-11-27 PROCEDURE — 80053 COMPREHEN METABOLIC PANEL: CPT | Performed by: EMERGENCY MEDICINE

## 2022-11-27 PROCEDURE — 99285 EMERGENCY DEPT VISIT HI MDM: CPT | Mod: 25 | Performed by: EMERGENCY MEDICINE

## 2022-11-27 PROCEDURE — 99284 EMERGENCY DEPT VISIT MOD MDM: CPT | Mod: 25 | Performed by: EMERGENCY MEDICINE

## 2022-11-27 PROCEDURE — 85004 AUTOMATED DIFF WBC COUNT: CPT | Performed by: EMERGENCY MEDICINE

## 2022-11-27 PROCEDURE — 93010 ELECTROCARDIOGRAM REPORT: CPT | Performed by: EMERGENCY MEDICINE

## 2022-11-27 PROCEDURE — 36415 COLL VENOUS BLD VENIPUNCTURE: CPT | Performed by: EMERGENCY MEDICINE

## 2022-11-27 PROCEDURE — 84484 ASSAY OF TROPONIN QUANT: CPT | Performed by: EMERGENCY MEDICINE

## 2022-11-27 PROCEDURE — 81001 URINALYSIS AUTO W/SCOPE: CPT | Performed by: EMERGENCY MEDICINE

## 2022-11-27 RX ORDER — AZITHROMYCIN 250 MG/1
TABLET, FILM COATED ORAL
Qty: 6 TABLET | Refills: 0 | Status: SHIPPED | OUTPATIENT
Start: 2022-11-27 | End: 2022-12-02

## 2022-11-27 ASSESSMENT — ACTIVITIES OF DAILY LIVING (ADL)
ADLS_ACUITY_SCORE: 33
ADLS_ACUITY_SCORE: 35
ADLS_ACUITY_SCORE: 35

## 2022-11-27 NOTE — ED PROVIDER NOTES
ED Provider Note  [facility]      History     Chief Complaint   Patient presents with     Chest Pain     Shortness of Breath     HPI  Eunice Barrientos is a 52 year old female who presents to the Emergency Department with chest pain and shortness of breath. Patient has a history of DVT (on warfarin), hydrocephalus s/p  shunt (2004), hypothyroidism, and HTN.  She reports that she had a cough for quite a while, developed hoarseness of her voice within the last couple of days, but denies sore throat.  No fever, nausea, vomiting.  Has chest pain when she coughs.  Patient also reports generalized weakness and lightheadedness, feels ill in general.    Per chart review, patient was seen at the [facility] ED on 11/17 with similar symptoms. She had chest CT done that noted multiple pulmonary nodules. She also tested positive for mumps. Patient was discharged home with referral to lung nodule clinic. She then presented to [facility] ED on 11/25 with shortness of breath and hemoptysis. CT chest PE without evidence of PE but there was some subtle enlargement of a few of her pulmonary nodules. Labs were unremarkable. She was discharged home and advised to keep her pulmonology appointment later this week.    CT CHEST ABDOMEN PELVIS 11/17/2022  IMPRESSION:  1. Enlarging pulmonary nodule right middle lobe. There are numerous additional scattered bilateral pulmonary nodules which are new with the largest in the right lower lobe measuring 5 mm. Findings indeterminate.  2. Fatty infiltration of the liver. Cavernous hemangioma posterior right hepatic lobe.  3. PO cholecystectomy.  4. Colonic diverticulosis.  5. Gastric diverticulum.  6. PO hysterectomy.  7. See above findings for additional details.    CT CHEST PE STUDY 11/25/2022  IMPRESSION:  1. Examination is mildly limited by less than optimal opacification of the pulmonary arterial circulation. Some of the subsegmental pulmonary arterial branches  are poorly evaluated. Within that limitation, no acute pulmonary embolism is seen.  2. Multiple indeterminate pulmonary nodules, a few which have subtly enlarged.  3. Interval increase in size of several hilar and subcarinal lymph nodes which may reflect reactive lymph nodes.  4. Mild peribronchial thickening suggesting airway inflammation.  5. No lung consolidation.    Past Medical History  Past Medical History:   Diagnosis Date     History of blood transfusion      History of DVT (deep vein thrombosis)     related to port     Hydrocephalus (H)      Hypertension goal BP (blood pressure) < 140/90      Macular degeneration (senile) of retina 7/6/2017     Thyroid disease      Past Surgical History:   Procedure Laterality Date     CHOLECYSTECTOMY       COLPOSCOPY CERVIX, BIOPSY CERVIX, ENDOCERVICAL CURETTAGE, COMBINED  2009    several, earliest was 1992     FUSION LUMBAR ANTERIOR ONE LEVEL  2013     intracranial shunt       LEEP TX, CERVICAL  1995     MASTOIDECTOMY  2003     OOPHORECTOMY Left      azithromycin (ZITHROMAX) 250 MG tablet  amLODIPine (NORVASC) 2.5 MG tablet  bisacodyl (DULCOLAX) 5 MG EC tablet  Blood Pressure Monitoring (BLOOD PRESSURE CUFF) MISC  diazepam (VALIUM) 5 MG tablet  gabapentin (NEURONTIN) 100 MG capsule  hydrochlorothiazide (HYDRODIURIL) 25 MG tablet  levothyroxine (SYNTHROID/LEVOTHROID) 125 MCG tablet  levothyroxine (SYNTHROID/LEVOTHROID) 137 MCG tablet  magnesium citrate solution  melatonin 3 MG tablet  nystatin (MYCOSTATIN) 130183 UNIT/GM external powder  polyethylene glycol (GOLYTELY) 236 g suspension  Prenatal Vit-Fe Fumarate-FA (PRENATAL MULTIVITAMIN  PLUS IRON) 27-0.8 MG TABS per tablet  rivaroxaban ANTICOAGULANT (XARELTO) 10 MG TABS tablet  WARFARIN SODIUM PO      Allergies   Allergen Reactions     Rivaroxaban Itching     No Clinical Screening - See Comments Difficulty breathing     Xeralto     Terazol [Terconazole] Hives     Family History  Family History   Problem Relation Age of  "Onset     Diabetes Maternal Grandmother      Hypertension Maternal Grandmother      Cancer Maternal Aunt      Cerebrovascular Disease No family hx of      Thyroid Disease No family hx of      Glaucoma No family hx of      Macular Degeneration No family hx of      Social History   Social History     Tobacco Use     Smoking status: Never     Smokeless tobacco: Never   Substance Use Topics     Alcohol use: No     Drug use: No      Past medical history, past surgical history, medications, allergies, family history, and social history were reviewed with the patient. No additional pertinent items.       Review of Systems  A complete review of systems was performed with pertinent positives and negatives noted in the HPI, and all other systems negative.    Physical Exam   BP: (!) 148/97  Pulse: 104  Temp: 98.5  F (36.9  C)  Height: 157.5 cm (5' 2\")  Weight: 77.1 kg (170 lb)  SpO2: 96 %  Physical Exam  GEN:  Alert, well developed, no acute distress  HEENT:  PERRL, EOMI, Mucous membranes are moist.   Cardio:  RRR, no murmur, radial pulses equal bilaterally  PULM:  Lungs clear, good air movement, no wheezes, rales   Abd:  Soft, normal bowel sounds, no focal tenderness  Back exam:  No CVA tenderness  Musculoskeletal:  normal range of motion, no lower extremity swelling or calf tenderness  Neuro:  Alert and oriented X3, Follows commands, moving all extremities spontaneously   Skin:  Warm, dry   ED Course      Procedures            EKG Interpretation:      Interpreted by Josseline Ruiz MD  Time reviewed: 10:42  Symptoms at time of EKG: shortness of breath   Rhythm: normal sinus   Rate: normal  Axis: normal  Ectopy: none  Conduction: Minimal voltage criteria for LVH, may be normal variant  ST Segments/ T Waves: No ST-T wave changes  Q Waves: none  Comparison to prior: Unchanged from July 27, 2021    Clinical Impression: Sinus rhythm, minimal voltage criteria for LVH, may be normal variant      Labs were reviewed by me " and results are shown below.  Patient had CT of her chest done yesterday, so repeat imaging did not seem to be needed today.  She is been having symptoms for quite some time.         Results for orders placed or performed during the hospital encounter of 11/27/22   Comprehensive metabolic panel     Status: Normal   Result Value Ref Range    Sodium 139 136 - 145 mmol/L    Potassium 4.4 3.4 - 5.3 mmol/L    Chloride 103 98 - 107 mmol/L    Carbon Dioxide (CO2) 25 22 - 29 mmol/L    Anion Gap 11 7 - 15 mmol/L    Urea Nitrogen 13.9 6.0 - 20.0 mg/dL    Creatinine 0.82 0.51 - 0.95 mg/dL    Calcium 9.4 8.6 - 10.0 mg/dL    Glucose 94 70 - 99 mg/dL    Alkaline Phosphatase 88 35 - 104 U/L    AST 22 10 - 35 U/L    ALT 31 10 - 35 U/L    Protein Total 7.1 6.4 - 8.3 g/dL    Albumin 4.2 3.5 - 5.2 g/dL    Bilirubin Total 0.4 <=1.2 mg/dL    GFR Estimate 87 >60 mL/min/1.73m2   Nt probnp inpatient (BNP)     Status: Normal   Result Value Ref Range    N terminal Pro BNP Inpatient 19 0 - 900 pg/mL   INR     Status: Abnormal   Result Value Ref Range    INR 2.11 (H) 0.85 - 1.15   Symptomatic; Unknown Influenza A/B & SARS-CoV2 (COVID-19) Virus PCR Multiplex Nasopharyngeal     Status: Normal    Specimen: Nasopharyngeal; Swab   Result Value Ref Range    Influenza A PCR Negative Negative    Influenza B PCR Negative Negative    RSV PCR Negative Negative    SARS CoV2 PCR Negative Negative    Narrative    Testing was performed using the Xpert Xpress CoV2/Flu/RSV Assay on the [employer] GeneXpert Instrument. This test should be ordered for the detection of SARS-CoV-2 and influenza viruses in individuals who meet clinical and/or epidemiological criteria. Test performance is unknown in asymptomatic patients. This test is for in vitro diagnostic use under the FDA EUA for laboratories certified under CLIA to perform high or moderate complexity testing. This test has not been FDA cleared or approved. A negative result does not rule out the presence of PCR  inhibitors in the specimen or target RNA in concentration below the limit of detection for the assay. If only one viral target is positive but coinfection with multiple targets is suspected, the sample should be re-tested with another FDA cleared, approved, or authorized test, if coinfection would change clinical management. This test was validated by the [facility]. These laboratories are certified under the Clinical Laboratory Improvement Amendments of 1988 (CLIA-88) as qualified to perform high complexity laboratory testing.   Troponin T, High Sensitivity     Status: Normal   Result Value Ref Range    Troponin T, High Sensitivity <6 <=14 ng/L   UA with Microscopic reflex to Culture     Status: Abnormal    Specimen: Urine, Midstream   Result Value Ref Range    Color Urine Yellow Colorless, Straw, Light Yellow, Yellow    Appearance Urine Slightly Cloudy (A) Clear    Glucose Urine Negative Negative mg/dL    Bilirubin Urine Negative Negative    Ketones Urine Negative Negative mg/dL    Specific Gravity Urine 1.024 1.003 - 1.035    Blood Urine Negative Negative    pH Urine 6.5 5.0 - 7.0    Protein Albumin Urine 10 (A) Negative mg/dL    Urobilinogen Urine Normal Normal, 2.0 mg/dL    Nitrite Urine Negative Negative    Leukocyte Esterase Urine Negative Negative    Mucus Urine Present (A) None Seen /LPF    RBC Urine 2 <=2 /HPF    WBC Urine 2 <=5 /HPF    Squamous Epithelials Urine 23 (H) <=1 /HPF    Narrative    Urine Culture not indicated   CBC with platelets and differential     Status: None   Result Value Ref Range    WBC Count 7.0 4.0 - 11.0 10e3/uL    RBC Count 4.10 3.80 - 5.20 10e6/uL    Hemoglobin 13.0 11.7 - 15.7 g/dL    Hematocrit 39.5 35.0 - 47.0 %    MCV 96 78 - 100 fL    MCH 31.7 26.5 - 33.0 pg    MCHC 32.9 31.5 - 36.5 g/dL    RDW 14.1 10.0 - 15.0 %    Platelet Count 246 150 - 450 10e3/uL    % Neutrophils 67 %    % Lymphocytes 21 %    % Monocytes 7 %    % Eosinophils 4 %    % Basophils 1 %     % Immature Granulocytes 0 %    NRBCs per 100 WBC 0 <1 /100    Absolute Neutrophils 4.8 1.6 - 8.3 10e3/uL    Absolute Lymphocytes 1.5 0.8 - 5.3 10e3/uL    Absolute Monocytes 0.5 0.0 - 1.3 10e3/uL    Absolute Eosinophils 0.3 0.0 - 0.7 10e3/uL    Absolute Basophils 0.0 0.0 - 0.2 10e3/uL    Absolute Immature Granulocytes 0.0 <=0.4 10e3/uL    Absolute NRBCs 0.0 10e3/uL   EKG 12 lead     Status: None   Result Value Ref Range    Systolic Blood Pressure  mmHg    Diastolic Blood Pressure  mmHg    Ventricular Rate 82 BPM    Atrial Rate 82 BPM    ME Interval 172 ms    QRS Duration 84 ms     ms    QTc 429 ms    P Axis 46 degrees    R AXIS -6 degrees    T Axis 16 degrees    Interpretation ECG       Sinus rhythm  Minimal voltage criteria for LVH, may be normal variant  Cannot rule out Anterior infarct , age undetermined  Abnormal ECG     CBC with platelets differential     Status: None    Narrative    The following orders were created for panel order CBC with platelets differential.  Procedure                               Abnormality         Status                     ---------                               -----------         ------                     CBC with platelets and d...[[id number]]                      Final result                 Please view results for these tests on the individual orders.     Medications - No data to display     Assessments & Plan (with Medical Decision Making)   Patient remained stable in the ED, EKG is reassuring, labs are reassuring.  The CT scan showing lung nodules were reviewed.  Patient is very concerned and anxious about what these nodules might be.  She does have an appointment to follow-up with pulmonology through [facility] in 4 days.  I encouraged her to keep this appointment so that she can get further evaluation of these nodules and further treatment.  She is not hypoxic, has normal heart rate, not febrile, however, given the unknown nature of these nodules, I decided to treat her  with antibiotics for possible community-acquired pneumonia given her cough with shortness of breath.  He was given prescription for Zithromax.  Patient was advised to return if she has worsening symptoms, fainting, vomiting, difficulty breathing, any other concerns.  CT scan done yesterday is negative for signs of pulmonary edema, pneumothorax.  I do not think this is cardiac, symptoms are not suggestive of acute coronary syndrome.    I have reviewed the nursing notes. I have reviewed the findings, diagnosis, plan and need for follow up with the patient.    Discharge Medication List as of 11/27/2022  3:41 PM      START taking these medications    Details   azithromycin (ZITHROMAX) 250 MG tablet Take 2 tablets (500 mg) by mouth daily for 1 day, THEN 1 tablet (250 mg) daily for 4 days., Disp-6 tablet, R-0, E-Prescribe             Final diagnoses:   Shortness of breath   Pulmonary nodules       --  Josseline Ruiz  [facility] EMERGENCY DEPARTMENT  11/27/2022     Josseline Ruiz MD  11/27/22 [id number]

## 2022-11-27 NOTE — ED TRIAGE NOTES
Pt presents with complaints of shortness of breath, non productive cough, and chest pain that is progressively getting worse. Pt diagnosed with mumps two weeks ago. Pt has frequently been seen. Most recent yesterday was told the nodules in her lungs have increased in size. Hx of BP shunt 2005

## 2022-11-27 NOTE — DISCHARGE INSTRUCTIONS
Then antibiotics will likely cause your INR to increase since you are taking warfarin.  Please recheck your INR in 2 days to see if you might need a dose adjustment of your warfarin.  If you have an increase in your INR, you are more likely to have bleeding complications.    Please keep your appointment to follow-up with the lung specialist this Thursday.  Return to the emergency department if you have difficulty breathing, worsening symptoms, any other concerns.

## 2023-05-15 ENCOUNTER — TELEPHONE (OUTPATIENT)
Dept: FAMILY MEDICINE | Facility: CLINIC | Age: 53
End: 2023-05-15
Payer: COMMERCIAL

## 2023-05-15 NOTE — TELEPHONE ENCOUNTER
Patient Quality Outreach    Patient is due for the following:   Breast Cancer Screening - Mammogram  Cervical Cancer Screening - PAP Needed    Next Steps:   Schedule a Annual Wellness Visit    Type of outreach:    Sent [employer] message.      Questions for provider review:    None           VIVEK WATKINS, CMA

## 2023-06-01 ENCOUNTER — HEALTH MAINTENANCE LETTER (OUTPATIENT)
Age: 53
End: 2023-06-01

## 2023-10-23 ENCOUNTER — TRANSFERRED RECORDS (OUTPATIENT)
Dept: HEALTH INFORMATION MANAGEMENT | Facility: CLINIC | Age: 53
End: 2023-10-23
Payer: COMMERCIAL

## 2023-10-26 NOTE — TELEPHONE ENCOUNTER
Is patient taking blood thinners/antiplatelet medications? No    Heart Disease? No    Lung Disease? No    Sleep Apnea? No    Diabetic? No    Kidney Disease? No    Electronic Implantable Devices? No    Indication for Procedure: bloody diarrhea, post prandial diarrhea     Referring Provider: Kiara Quintana APRN CNP     Arrival Time: 0830    Writer reviewed pre-assessment questions with patient prior to upcoming colonoscopy on 7.8.2021.  COVID test scheduled for 7.5.2021.    Reviewed extended prep instructions with patient.  Noting no nuts, seeds, or popcorn 7 days prior to procedure.  Prep sent to [facility] on [facility] Per pt preference.     Designated  policy reviewed with patient.     Patient verbalized understanding.  No further questions or concerns.    Jenise Paul RN             Oriented - self; Oriented - place; Oriented - time

## 2023-11-14 ENCOUNTER — TRANSFERRED RECORDS (OUTPATIENT)
Dept: HEALTH INFORMATION MANAGEMENT | Facility: CLINIC | Age: 53
End: 2023-11-14

## 2023-11-14 ENCOUNTER — TRANSFERRED RECORDS (OUTPATIENT)
Dept: HEALTH INFORMATION MANAGEMENT | Facility: CLINIC | Age: 53
End: 2023-11-14
Payer: COMMERCIAL

## 2023-11-16 ENCOUNTER — TELEPHONE (OUTPATIENT)
Dept: OPHTHALMOLOGY | Facility: CLINIC | Age: 53
End: 2023-11-16
Payer: COMMERCIAL

## 2023-11-16 NOTE — TELEPHONE ENCOUNTER
11-16-23  Retrieved RightFax from Dr. Addy Lubin/[facility] for OVF+pERG+ffERG+EOG and genetic testing.  To call Palma at [facility] [phone] so that they can send a separate Referral to Peds-Genetics ([facility]) to schedule genetic testing.    Called and spoke w/pt, [phone], per face sheet from [facility].  Discussed option of testing all on one day or separate days.  Expected dilation and duration of 4-5 hours at least.  Pt opted for all on one day.  Informed info packet w/map and directions and descriptive handouts will be sent.  Given my direct# prn, [phone].    TT will route to  to schedule:    Date and time = Tue Nov 28 at 12:00  Eye, Electrodiagnostic == ffERG  Attending = Stefanie  Referring = Addy Lubin/[facility]  Appt Notes = central vision loss right eye, dry AMD right eye, atrophic AMD w/geographic atrophy left eye//functional OVF+pERG+ffERG+EOG  Duration = 5.5 hours  Message = chart at  for TT      to call to update pt info and demographics first.  Please send info packet w/appt reminder, map/directions and handouts.        11-17-23  Called and spoke w/Palma and informed [facility] to call Peds Genetics, [phone], or to do an online referral for genetic testing ([email or web address]).  Not sure if an eval prior to ordering genetic testing is needed but pt background info is needed at the very least.  Only testing as listed above was scheduled for Nov 28.  No consultation in the [facility] was listed in the RightFax recd yesterday.

## 2023-11-17 ENCOUNTER — TELEPHONE (OUTPATIENT)
Dept: CONSULT | Facility: CLINIC | Age: 53
End: 2023-11-17
Payer: COMMERCIAL

## 2023-11-17 NOTE — TELEPHONE ENCOUNTER
[facility] Call Center    Phone Message    May a detailed message be left on voicemail: yes     Reason for Call: Other: [facility] called on patient's behalf to schedule New Genetics appointment. No referral or consulting in chat so patient will need to be contacted to continue scheduling. Palma from [facility] would also like to be contacted to confirm scheduling. Would like a call back, Please.     Palma- [facility]  Ph: [phone]    Action Taken: Other: Genetics    Travel Screening: Not Applicable

## 2023-11-20 DIAGNOSIS — H47.11 PAPILLEDEMA ASSOCIATED WITH INCREASED INTRACRANIAL PRESSURE: ICD-10-CM

## 2023-11-20 DIAGNOSIS — H35.89 OTHER SPECIFIED RETINAL DISORDERS: ICD-10-CM

## 2023-11-20 DIAGNOSIS — H35.363 DRUSEN (DEGENERATIVE) OF MACULA, BILATERAL: ICD-10-CM

## 2023-11-20 DIAGNOSIS — H35.3124 ADVANCED ATROPHIC NONEXUDATIVE AGE-RELATED MACULAR DEGENERATION OF LEFT EYE WITH SUBFOVEAL INVOLVEMENT: ICD-10-CM

## 2023-11-20 DIAGNOSIS — H35.3112 INTERMEDIATE STAGE NONEXUDATIVE AGE-RELATED MACULAR DEGENERATION OF RIGHT EYE: Primary | ICD-10-CM

## 2023-11-27 NOTE — TELEPHONE ENCOUNTER
Spoke with patient and assisted in scheduling GC only visit with Shreya West.     Message left for Palma at [facility] informing her of this.

## 2023-11-29 ENCOUNTER — TELEPHONE (OUTPATIENT)
Dept: OPHTHALMOLOGY | Facility: CLINIC | Age: 53
End: 2023-11-29
Payer: COMMERCIAL

## 2023-11-29 NOTE — TELEPHONE ENCOUNTER
11-29-23  Pt called to R/S missed appt yesterday d/t family emergency.  Pt has virtual Genetics appt w/Shreya scheduled for Jan 5 so we should try to get testing done before then.  Next available opening for all testing on same day is Dec 19.  Discussed expectations for duration and optimal attentiveness for testing.  Options given for more than one testing session but decided to try for single day.  Agreed upon date and time.      TT will route to  to schedule:    ffERG appt for 11-28 at 12:00 has already been NO-SHOWED.  Reschedule to TUE DEC 19 at 8:00.     All appt info/msg etc remain the same as previous EXCEPT START TIME WILL BE 8:00.     Please send another info packet w/appt reminder, map/directions and all available handouts.

## 2024-01-03 ENCOUNTER — TELEPHONE (OUTPATIENT)
Dept: OPHTHALMOLOGY | Facility: CLINIC | Age: 54
End: 2024-01-03
Payer: COMMERCIAL

## 2024-01-04 NOTE — TELEPHONE ENCOUNTER
01-03-24  Retrieved VM from 12-26.  Pt apologized for No Show appt of multiple testing (fx OVF+pERG+ffERG+EOG) and desires to reschedule.  TT to look at calendar for possible dates--expected duration ~5-6hours.-->Jan 31 at this time, o/w Feb    Called and spoke w/pt.  Pt to keep appt w/Shreya for tomorrow (virtual).  Agreed to r/s all testing to Jan 31 at 8:00 (pt coming at 9:00).  TT to route to [facility].    TT will route to  to schedule:     Date and time = Wed Jan 31 at 8:00  Eye, Electrodiagnostic == ffERG  Attending = Stefanie  Referring = Addy Lubin/[facility]  Appt Notes = DO NOT NO SHOW-PT COMING AT 9:00//central vision loss right eye, dry AMD right eye, atrophic AMD w/geographic atrophy left eye//functional OVF+pERG+ffERG+EOG  Duration = 5.5 hours  Message = chart at  for TT     Please send info packet w/appt reminder, map/directions and handouts.

## 2024-01-04 NOTE — PROGRESS NOTES
"GENETIC COUNSELING CONSULTATION NOTE    Date of visit: Jan 5, 2024    Presenting Information:   Eunice Barrientos is a 54 year old female referred to the [facility] due to macular degeneration. She was seen for a genetic counseling appointment at the request of her ophthalmologist Dr. Addy Lubin today.     Eunice was referred by Dr. Lubin at [facility]. Per the limited notes available, Eunice has advanced atrophic nonexudative age-related macular degeneration of left eye with subfoveal involvement; Drusen (degenerative) of macula, bilateral; Papilledema associated with increased intracranial pressure.     Today, Eunice reports that she was diagnosed with macular degeneration in her 30's but her vision symptoms became much worse in 2006. 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 She had headaches and could not walk or talk. She had a shunt placed and did rehabilitation at [facility]. In 2006 she had an infection in her shunt and was in the ICU for 8 weeks and this is when she noticed her vision symptoms progress. She does not report any light sensitivity but has difficulty seeing in the dark. She also reports that her peripheral vision might be declining and described that she went to pour creamer in her cup of coffee and she missed the cup. She also sees spots in her vision and has \"a cloud over her vision.\" She no longer drives at night.     With regards to other health history, Eunice is followed by Pulmonology for sarcoidosis and has had blood pressure issues.    Family History: A three generation pedigree was obtained and scanned into the electronic medical record. The relevant portions are described below:    Children-  30 year old daughter who is healthy. She has 5 children.   29 year old son who is healthy. He has a daughter.   27 year old son (twin) who has had abnormal eye exams, no specific diagnosis " mentioned. He has no children.   27 year old daughter (twin) who is healthy. She has 2 children.   25 year old daughter who was told by an eye doctor that she has macular problems. She has 2 children.   24 year old daughter who is healthy. She has a son.   23 year old son who is healthy. He has a daughter.   22 year old son who is healthy. He has no children.  Siblings-   49 year old brother who is healthy. He has a 16 year old daughter who is healthy.   Parents-   Mother, age 69, is healthy with no reported vision concerns.   Father passed away at age 33.   Maternal Relatives-   One maternal aunt who passed away in her 30's due to metastatic cervical cancer.   One maternal uncle who passed away in his 60's due to lung cancer.   One maternal uncle who is alive and well.   No health concerns for cousins.   Maternal grandmother wore glasses and had ptosis. She passed away at age 54 due to a blood clot in her lungs.   Maternal grandfather passed away in his 50-60's due to brain aneurysm.   Paternal Relatives-   At least one paternal uncle.   No other information known about paternal relatives.     Family history is otherwise largely non-contributory. Maternal ancestry is [place] and other mix and paternal ancestry is [place]. Consanguinity was denied.     Genetic Counseling Discussion:  For review, our bodies are made of cells that contain our chromosomes which are made up of long stretches of DNA containing our genes. Our genes serve as the instructions for our bodies to grow and function. We have two copies of each gene, one inherited from our mother and one inherited from our father.     Retinal dystrophy is a broad category of eye conditions that are all associated with breakdown or degeneration of the retina.  Depending on the specific condition, this degeneration can affect the various different cells types of the retina, and therefore, the specific symptoms can vary significantly from one retinal dystrophy condition  "to another.  Most commonly affected areas of the retina include the photoreceptors (rods and cones) or the retinal pigment epithelium (RPE).  Many of these retinal dystrophy conditions are associated with progressive vision loss, but different types of retinal dystrophies exhibit variation in the speed of progression and degree of severity.  Additionally, some forms of retinal dystrophy are congenital, while others have a childhood or adult onset.  We discussed that many of the retinal dystrophies can present with overlapping symptoms and variable family histories, and so it is often difficult to categorize the specific form of retinal dystrophy in a particular individual without the use of genetic testing.    Some forms of retinal dystrophy are \"non-syndromic,\" meaning that the only symptoms associated with those genetic conditions are eye-related; examples of non-syndromic retinal dystrophy conditions include cone-octaviano dystrophy, retinitis pigmentosa, achromatopsia, and Kalie congenital amaurosis.  On the other hand, there are also \"syndromic\" forms of retinal dystrophy, in which there are associated additional medical/developmental issues, along with the eye symptoms.  Some examples of syndromic retinal dystrophies include Usher syndrome, Bardet Biedl syndrome, and Torie syndrome.  Because of the variable onset and variable clinical presentation of these conditions, it can often be difficult to determine in a young child if they have a syndromic or non-syndromic form of retinal dystrophy.    We discussed that retinal dystrophies are genetic conditions that can show a variety of different inheritance patterns depending on the specific underlying gene involved for that affected individual.  Some retinal dystrophy conditions are associated with an autosomal recessive pattern of inheritance, meaning an individual needs two changes (pathogenic variants), one on each copy of the gene, in order to be affected.  Some " forms of retinal dystrophies show an X-linked recessive pattern of inheritance, meaning a gene on the X chromosome has a change (pathogenic variant). Generally males are more severely affected with X-linked disorders than females. Other retinal dystrophies can have an autosomal dominant pattern of inheritance, meaning a single change (pathogenic variant) on one copy of the gene is enough to cause the condition. For individuals with dominant inheritance, they can be inherited from an affected parent or brand new in the affected individual (de shannon). For individuals with dominant conditions, there is a 50% tae of passing the condition to a child.      There are currently over 300 genes that have been associated with retinal dystrophy conditions.  Some of these genes have gene therapy treatment options, where individuals have been shown to have their vision loss reversed/improved.  Only determination of the underlying genetic cause of retinal dystrophy (via genetic testing) will allow individuals to know if they are candidates for gene therapy.  We reviewed the availability of genetic testing via Next Generation Sequencing (NGS) for analysis of genes known to be associated with retinal dystrophy, with the aim of determining what condition is causing Eunice's eye symptoms.     We discussed the availability of a 330 gene panel offered through [employer]. This panel can be performed as a part of an [employer] sponsored testing program. By participating in the program, participants agree to have de-identified genetic data be shared with possible researchers in the future. This sample panel can also be performed through insurance or for a self-pay cost of $250.     We went on to discuss the details, limitations, and possible outcomes of testing. In particular, we discussed that there are three possible results:  Negative: meaning normal or no mutations are identified in the genes that were tested/sequenced  Positive:  meaning a mutation that is known to be associated with a particular set of symptoms is identified  Variant of uncertain significance (VUS): meaning a change in the DNA sequence of a particular gene was seen but there is not enough information or data yet to know if it explains the symptoms. If a VUS is identified, testing of other relatives may be helpful to provide clarification.  In most cases, identification of a VUS does not confirm a diagnosis and does not result in any clinically actionable recommendations.    We discussed the potential benefits of genetic testing and why this genetic testing is medically indicated. A positive result will help determine the etiology of the vision symptoms noted in Eunice and may guide the medical management for her. Also, if a genetic cause is found for Eunice, it will give us a more accurate risk assessment for other family members, particularly her children.    Next Generation Sequencing is a well established technology utilized by all molecular genetic labs throughout the country for identifying disease-causing mutations in various genes.  Next Generation Sequencing is currently the standard of care for genetic testing of single genes.  The recommended testing for Eunice is DIAGNOSTIC testing, and it is NOT investigational.    Eunice consented to genetic testing today. A buccal kit will be mailed to her for sample collection. I will call Eunice with results 2-3 weeks after the lab receives her sample.      It was a pleasure meeting Eunice today. She was encouraged to reach out to me if she has any further questions.     Plan:  [employer] sponsored IRD panel  I will call Eunice with results about 2-3 weeks after the lab receives her sample.        Shreya West MS, [facility]  Licensed Genetic Counselor   [facility]  Phone: [phone]  Fax: [phone]    Time spent in consultation face to face was approximately 30 minutes.

## 2024-01-05 ENCOUNTER — VIRTUAL VISIT (OUTPATIENT)
Dept: CONSULT | Facility: CLINIC | Age: 54
End: 2024-01-05
Attending: GENETIC COUNSELOR, MS
Payer: COMMERCIAL

## 2024-01-05 DIAGNOSIS — H35.50 RETINAL DYSTROPHY: Primary | ICD-10-CM

## 2024-01-05 DIAGNOSIS — Z71.83 ENCOUNTER FOR NONPROCREATIVE GENETIC COUNSELING: ICD-10-CM

## 2024-01-05 PROCEDURE — 96040 HC GENETIC COUNSELING, EACH 30 MINUTES: CPT | Mod: GT,95 | Performed by: GENETIC COUNSELOR, MS

## 2024-01-05 NOTE — LETTER
"1/5/2024      RE: Eunice Barrientos  [address]  [place] [address]     Dear Colleague,    Thank you for the opportunity to participate in the care of your patient, Eunice Barrientos, at the [facility] at [facility]. Please see a copy of my visit note below.    GENETIC COUNSELING CONSULTATION NOTE    Date of visit: Jan 5, 2024    Presenting Information:   Eunice Barrientos is a 54 year old female referred to the [facility] due to macular degeneration. She was seen for a genetic counseling appointment at the request of her ophthalmologist Dr. Addy Lubin today.     Eunice was referred by Dr. Lubin at [facility]. Per the limited notes available, Eunice has advanced atrophic nonexudative age-related macular degeneration of left eye with subfoveal involvement; Drusen (degenerative) of macula, bilateral; Papilledema associated with increased intracranial pressure.     Today, Eunice reports that she was diagnosed with macular degeneration in her 30's but her vision symptoms became much worse in 2006. 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 She had headaches and could not walk or talk. She had a shunt placed and did rehabilitation at [facility]. In 2006 she had an infection in her shunt and was in the ICU for 8 weeks and this is when she noticed her vision symptoms progress. She does not report any light sensitivity but has difficulty seeing in the dark. She also reports that her peripheral vision might be declining and described that she went to pour creamer in her cup of coffee and she missed the cup. She also sees spots in her vision and has \"a cloud over her vision.\" She no longer drives at night.     With regards to other health history, Eunice is followed by Pulmonology for sarcoidosis and has had blood pressure " issues.    Family History: A three generation pedigree was obtained and scanned into the electronic medical record. The relevant portions are described below:    Children-  30 year old daughter who is healthy. She has 5 children.   29 year old son who is healthy. He has a daughter.   27 year old son (twin) who has had abnormal eye exams, no specific diagnosis mentioned. He has no children.   27 year old daughter (twin) who is healthy. She has 2 children.   25 year old daughter who was told by an eye doctor that she has macular problems. She has 2 children.   24 year old daughter who is healthy. She has a son.   23 year old son who is healthy. He has a daughter.   22 year old son who is healthy. He has no children.  Siblings-   49 year old brother who is healthy. He has a 16 year old daughter who is healthy.   Parents-   Mother, age 69, is healthy with no reported vision concerns.   Father passed away at age 33.   Maternal Relatives-   One maternal aunt who passed away in her 30's due to metastatic cervical cancer.   One maternal uncle who passed away in his 60's due to lung cancer.   One maternal uncle who is alive and well.   No health concerns for cousins.   Maternal grandmother wore glasses and had ptosis. She passed away at age 54 due to a blood clot in her lungs.   Maternal grandfather passed away in his 50-60's due to brain aneurysm.   Paternal Relatives-   At least one paternal uncle.   No other information known about paternal relatives.     Family history is otherwise largely non-contributory. Maternal ancestry is [place] and other mix and paternal ancestry is [place]. Consanguinity was denied.     Genetic Counseling Discussion:  For review, our bodies are made of cells that contain our chromosomes which are made up of long stretches of DNA containing our genes. Our genes serve as the instructions for our bodies to grow and function. We have two copies of each gene, one inherited from our mother and one  "inherited from our father.     Retinal dystrophy is a broad category of eye conditions that are all associated with breakdown or degeneration of the retina.  Depending on the specific condition, this degeneration can affect the various different cells types of the retina, and therefore, the specific symptoms can vary significantly from one retinal dystrophy condition to another.  Most commonly affected areas of the retina include the photoreceptors (rods and cones) or the retinal pigment epithelium (RPE).  Many of these retinal dystrophy conditions are associated with progressive vision loss, but different types of retinal dystrophies exhibit variation in the speed of progression and degree of severity.  Additionally, some forms of retinal dystrophy are congenital, while others have a childhood or adult onset.  We discussed that many of the retinal dystrophies can present with overlapping symptoms and variable family histories, and so it is often difficult to categorize the specific form of retinal dystrophy in a particular individual without the use of genetic testing.    Some forms of retinal dystrophy are \"non-syndromic,\" meaning that the only symptoms associated with those genetic conditions are eye-related; examples of non-syndromic retinal dystrophy conditions include cone-octaviano dystrophy, retinitis pigmentosa, achromatopsia, and Kalie congenital amaurosis.  On the other hand, there are also \"syndromic\" forms of retinal dystrophy, in which there are associated additional medical/developmental issues, along with the eye symptoms.  Some examples of syndromic retinal dystrophies include Usher syndrome, Bardet Biedl syndrome, and Torie syndrome.  Because of the variable onset and variable clinical presentation of these conditions, it can often be difficult to determine in a young child if they have a syndromic or non-syndromic form of retinal dystrophy.    We discussed that retinal dystrophies are genetic " conditions that can show a variety of different inheritance patterns depending on the specific underlying gene involved for that affected individual.  Some retinal dystrophy conditions are associated with an autosomal recessive pattern of inheritance, meaning an individual needs two changes (pathogenic variants), one on each copy of the gene, in order to be affected.  Some forms of retinal dystrophies show an X-linked recessive pattern of inheritance, meaning a gene on the X chromosome has a change (pathogenic variant). Generally males are more severely affected with X-linked disorders than females. Other retinal dystrophies can have an autosomal dominant pattern of inheritance, meaning a single change (pathogenic variant) on one copy of the gene is enough to cause the condition. For individuals with dominant inheritance, they can be inherited from an affected parent or brand new in the affected individual (de shannon). For individuals with dominant conditions, there is a 50% tae of passing the condition to a child.      There are currently over 300 genes that have been associated with retinal dystrophy conditions.  Some of these genes have gene therapy treatment options, where individuals have been shown to have their vision loss reversed/improved.  Only determination of the underlying genetic cause of retinal dystrophy (via genetic testing) will allow individuals to know if they are candidates for gene therapy.  We reviewed the availability of genetic testing via Next Generation Sequencing (NGS) for analysis of genes known to be associated with retinal dystrophy, with the aim of determining what condition is causing Eunice's eye symptoms.     We discussed the availability of a 330 gene panel offered through [employer]. This panel can be performed as a part of an [employer] sponsored testing program. By participating in the program, participants agree to have de-identified genetic data be shared with possible  researchers in the future. This sample panel can also be performed through insurance or for a self-pay cost of $250.     We went on to discuss the details, limitations, and possible outcomes of testing. In particular, we discussed that there are three possible results:  Negative: meaning normal or no mutations are identified in the genes that were tested/sequenced  Positive: meaning a mutation that is known to be associated with a particular set of symptoms is identified  Variant of uncertain significance (VUS): meaning a change in the DNA sequence of a particular gene was seen but there is not enough information or data yet to know if it explains the symptoms. If a VUS is identified, testing of other relatives may be helpful to provide clarification.  In most cases, identification of a VUS does not confirm a diagnosis and does not result in any clinically actionable recommendations.    We discussed the potential benefits of genetic testing and why this genetic testing is medically indicated. A positive result will help determine the etiology of the vision symptoms noted in Eunice and may guide the medical management for her. Also, if a genetic cause is found for Eunice, it will give us a more accurate risk assessment for other family members, particularly her children.    Next Generation Sequencing is a well established technology utilized by all molecular genetic labs throughout the country for identifying disease-causing mutations in various genes.  Next Generation Sequencing is currently the standard of care for genetic testing of single genes.  The recommended testing for Eunice is DIAGNOSTIC testing, and it is NOT investigational.    Eunice consented to genetic testing today. A buccal kit will be mailed to her for sample collection. I will call Eunice with results 2-3 weeks after the lab receives her sample.      It was a pleasure meeting Eunice today. She was encouraged to reach out to me if she has any further  questions.     Plan:  [employer] sponsored IRD panel  I will call Eunice with results about 2-3 weeks after the lab receives her sample.        Shreya West MS, [facility]  Licensed Genetic Counselor   [facility]  Phone: [phone]  Fax: [phone]    Time spent in consultation face to face was approximately 30 minutes.

## 2024-01-14 ENCOUNTER — HEALTH MAINTENANCE LETTER (OUTPATIENT)
Age: 54
End: 2024-01-14

## 2024-02-01 ENCOUNTER — TELEPHONE (OUTPATIENT)
Dept: OPHTHALMOLOGY | Facility: CLINIC | Age: 54
End: 2024-02-01
Payer: COMMERCIAL

## 2024-02-01 NOTE — TELEPHONE ENCOUNTER
02-01-24  Retrieved VM.  Pt is in the hospital and was unable to come in for multiple tests scheduled yesterday.  Called spoke with pt and advised to call to schedule when she is able to tolerate a half-day's worth of testing.  At this time, there is no urgency.    02-07-24  Called and spoke w/pt.  Agreed upon date and time for multiple testing appts.  Pt also asks about genetic testing kit since she has not received it yet in the mail after the video appt w/JONO.  There was a recent address change and I will update in Demographics and inform Shreya.  Also, okay to give kit to pt on day of testing/per Shreya.      TT will route to  to schedule:    Date and time = Wed Feb 14 at 10:00  Eye, Electrodiagnostic == ffERG  Attending = Stefanie  Ref[facility] = Add[facility]  Appt Notes = central vision loss right eye, dry AMD right eye, atrophic AMD w/geographic atrophy left eye//fxOVF+pERG+ffERG+EOG  Duration = 5.5 hours  Message = chart at  for TT      Please send info packet w/appt reminder, map/directions and handouts.  I will correct address in Demographics but if it is not showing, this is address:    [address] [place] [address]      02-13-24  Pt called to confirm time of appts tmro.  Also, that she did receive the genetic testing kit in the mail but she may have done the swab incorrectly since she took a sip of coffee.  Informed pt that Shreya (JONO) did leave another kit with me and she can re-do the swab tmro.    02-14-24  [employer] Buccal Swab Collection Kit given to pt.

## 2024-02-14 ENCOUNTER — ALLIED HEALTH/NURSE VISIT (OUTPATIENT)
Dept: OPHTHALMOLOGY | Facility: CLINIC | Age: 54
End: 2024-02-14
Attending: OPHTHALMOLOGY
Payer: COMMERCIAL

## 2024-02-14 DIAGNOSIS — H35.89 OTHER SPECIFIED RETINAL DISORDERS: ICD-10-CM

## 2024-02-14 DIAGNOSIS — H35.3112 INTERMEDIATE STAGE NONEXUDATIVE AGE-RELATED MACULAR DEGENERATION OF RIGHT EYE: ICD-10-CM

## 2024-02-14 DIAGNOSIS — H47.11 PAPILLEDEMA ASSOCIATED WITH INCREASED INTRACRANIAL PRESSURE: ICD-10-CM

## 2024-02-14 DIAGNOSIS — H35.3124 ADVANCED ATROPHIC NONEXUDATIVE AGE-RELATED MACULAR DEGENERATION OF LEFT EYE WITH SUBFOVEAL INVOLVEMENT: ICD-10-CM

## 2024-02-14 DIAGNOSIS — H35.363 DRUSEN (DEGENERATIVE) OF MACULA, BILATERAL: ICD-10-CM

## 2024-02-14 PROCEDURE — 92273 FULL FIELD ERG W/I&R: CPT

## 2024-02-14 PROCEDURE — 0509T PATTERN ERG W/I&R: CPT | Mod: 26 | Performed by: OPHTHALMOLOGY

## 2024-02-14 PROCEDURE — 999N000103 HC STATISTIC NO CHARGE FACILITY FEE

## 2024-02-14 PROCEDURE — 92083 EXTENDED VISUAL FIELD XM: CPT | Mod: 26 | Performed by: OPHTHALMOLOGY

## 2024-02-14 PROCEDURE — 92083 EXTENDED VISUAL FIELD XM: CPT

## 2024-02-14 PROCEDURE — 92270 ELECTRO-OCULOGRAPHY W/I&R: CPT | Mod: 26 | Performed by: OPHTHALMOLOGY

## 2024-02-14 PROCEDURE — 0509T PATTERN ERG W/I&R: CPT

## 2024-02-14 PROCEDURE — 92270 ELECTRO-OCULOGRAPHY W/I&R: CPT

## 2024-02-14 PROCEDURE — 92273 FULL FIELD ERG W/I&R: CPT | Mod: 26 | Performed by: OPHTHALMOLOGY

## 2024-02-14 ASSESSMENT — REFRACTION_WEARINGRX
OS_SPHERE: -6.50
OD_SPHERE: -6.50
SPECS_TYPE: BIFOCAL
OD_AXIS: 084
OD_CYLINDER: +1.75

## 2024-02-14 ASSESSMENT — VISUAL ACUITY
OD_CC: 20/300
METHOD: SNELLEN - LINEAR
CORRECTION_TYPE: GLASSES
OS_CC: HM

## 2024-02-14 NOTE — LETTER
"2024    STANDARD OVF+pERG+ffERG+EOG REPORT    Testing Date:  2024    Referring:  Addy Lubin MD / [facility]    RE: Eunice Barrientos  MRN: [id number]  : 1970                 CLINICAL HISTORY:  Referred by Dr. Addy Lubin/[facility] for functional OVF+pERG+ffERG+EOG as well as genetic counseling (video appt with Shreya 24, testing yet to be done).  Last eye exam with Dr. Lubin 23:  1)  Central visual loss right eye.  There continues to be no findings that suggest a clear ocular cause for her symptom of missing faces and a significant decrease in her central VA.  H/O intracranial shunt  and replaced  d/t infection.  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  3)  Dry AMD, intermediate stage right eye.  Dry ARMD is mild and has minimal impact on vision at this time.  4)  History of blood clots.  Currently on Warfarin.  5)  Sarcoidosis dx .  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  C/O \"dark clouds in front of eyes\".  Renewed DL 4 yrs ago but now no longer feels comfortable to drive.  Needs additional lighting compared to others.  Not especially light sensitive.       IMPRESSION Octopus 900:  Constriction of the visual field, both eyes, left eye >> right eye     Tech notes:  Good fix. Not dilated. On the right eye patient did miss 3 vectors as she could not see them. On the left eye while testing, two of the tests the patient could not see. Due to patient not seeing the dots there was nothing to connect at this time.     Right Eye: Constricted visual field to 110 horizontal and 70 degrees vertical with V4e; no other scotomas identified  Left Eye:  Constricted visual field to 55 horizontal and 20 degrees vertical with V4e; no other scotomas identified      IMPRESSION pERG: Macula dysfunction left " eye      Tech notes: Functional OVF done just prior, both eyes undilated.  Instructed on importance of maintaining fixation and relaxation for optimum recordings.  pERG discussed and performed with E3 system, both eyes undilated w/pg.  Halberg clips used for near cxn.  Pupils ~4mm.  Tolerated dtl nicely.  Nice large eye openings and lax lids.  Equal and adequate eye opening w/easy effort to clear pupils as needed.  Impedances low and comparable throughout.  Relaxed in exam chair.  Good fixation and generally good concentration.  NOTE:  +Fatigued and monitored for droopy lids d/t not a good night's sleep.  Increased rejection and duration to accept minimum for averaging but very little noise.      INTERPRETATION:  This  pattern electroretinogram (PERG) was performed according to ISCEV standards using the [place] GardnerION E3 system and DTL fiber-recording electrodes. No dilation necessary for the PERG. The patient tolerated the testing well. The patient had good fixation and no difficulty with blinking. The waveforms are fairly reproducible and well formed. The responses were asymmetric between both eyes with right eye responses greater than left eye.     CONCLUSION: This represents abnormal PERG suspicious for macula dysfunction left eye. The pattern electroretinogram (PERG) provides an objective measure of central retinal function. The PERG contains two main components, a positivity at approximately 50ms (P50) and a larger negativity at approximately 95ms (N95).   Right eye appears normal and in the left eye the P50 component is affected by macular dysfunction with concomitant reduction in N95.     Visual Acuity: Right Eye: 20/300      W/gls, -6.50 +1.75 x 084      Left Eye: HM, temporal      W/gls, -6.50sph       ALL AVERAGED   Data for Pattern ERG Right Eye  Amplitude ( v) Left Eye    Patient Normal Patient    P50 Amplitude 1.33(2.24) not avail 0.87(0.90)    N95  Amplitude -2.70(-3.48) not avail -1.43(-1.11)    N95 : P50  Ratio 2.03(1.55) not avail 1.64(1.23)   --- = residual to non-measurable      Parentheses = @50cm, otherwise data is @100cm         STANDARD ffERG REPORT    IMPRESSION ffERG: mild asymmetry in the responses between both eyes with right eye responses greater than left eye. Otherwise likely Normal electroretinogram (ERG)                                      ALL AVERAGED                  Data for Full-Field ERG  Right Eye  Left Eye   DARK-ADAPTED Patient Normal Patient   0.01 ERG (octaviano) b-wave amplitude ( v) 216 70 to 354.6 174*   0.01 ERG (octaviano) b-wave implicit time (ms) 88.8 83.8 to 108.6 86.5*         3.0 ERG (combined) a-wave amplitude ( v) -160 -268.4 to -93.8 -135   3.0 ERG (combined) a-wave implicit time (ms) 21.6 11.9 to 20.3 22.2   3.0 ERG (combined) b-wave amplitude ( v) 312 173.9 to 400.3 249   3.0 ERG (combined) b-wave implicit time (ms) 53.8 37.2 to 56.8 56         10.0 ERG (brighter combined)a-wave amplitude ( v) -203 -279.4 to -113 -168   10.0 ERG (brighter combined)a-wave implicit time (ms) 13.9 10.6 to 15.4 13.3   10.0 ERG (brighter combined)b-wave amplitude ( v) 327 199.9 to 437.1 257   10.0 ERG (brighter combined)b-wave implicit time (ms) 53.8 36.1 to 52.7 56.6         3.0 Oscillatory Potentials  Present     LIGHT-ADAPTED       3.0 Flicker (30Hz) amplitude ( v) 89 57.2 to 155.2 80   3.0 Flicker (30Hz) implicit time (ms) 27.7 23.7 to 30.3 28.8         3.0 ERG (cone) a-wave amplitude ( v) -27 -53.6 to -15.2 -22   3.0 ERG (cone) a-wave implicit time (ms) 15.5 11.7 to 16.7 16.1   3.0 ERG (cone) b-wave amplitude ( v) 110 56.6 to 191.8 92   3.0 ERG (cone) b-wave implicit time (ms) 31.6 27.4 to 34 32.2   * = manipulated cursors  parentheses = cursors at selected peaks  ---- = residual to non-measurable  xxxx = not tested      Tech notes: Functional OVF and pERG done just prior, both eyes undilated.  Instructed on importance of maintaining fixation and relaxation for optimum recordings.  ffERG discussed and  performed with E3 system.  Equally well-dilated ~9mm.  Tolerated dtl nicely.  Nice large eye openings w/lax lids.  Equal and adequate eye opening with easy effort to clear dilated pupils.  Impedances low and comparable throughout.  No difficulty w/blinking.  Specifically, no eyelid flutter to bright flashes DA+LA and none evidenced with flicker.    NOTE:  Occasional cough more bothersome during DA 3.0 and DA 10.0.      INTERPRETATION:  This electroretinogram was performed according to ISCEV standards using the ESPION E3 system and DTL fiber-recording electrodes. The patient tolerated the testing well. The waveforms are fairly reproducible and well formed. The responses in between both eyes were mildly asymmetric with right eye responses greater than left eye. The normative values provided above represent the 95% confidence limits for a normal individual the age of the patient. The patient s responses are averaged.    In scotopic conditions, the octaviano-specific responses were normal in both eyes. The maximal response, a combined octaviano and cone response, was essentially normal and the implicit time was normal in both eyes. The implicit time was delayed in the combined a-wave of both eyes. The 10.0 brighter combined was not electronegative but there was delayed b-wave implicit time.    In light-adapted conditions, the 30-Hz flicker response has a normal amplitude and normal implicit time in both eyes. The single photopic flash responses are normal.    CONCLUSION: Mild asymmetry in the responses between both eyes with right eye responses greater than left eye. Otherwise likely normal electroretinogram (ERG). There is no evidence of significant octaviano or cone photoreceptor abnormalities in either eye. There is delay in the implicit time combined a-wave response and in the brighter combined b-wave response of unclear significance.    Clinical correlation is recommended.       I would recommend a repeat electroretinogram in a  couple of years if there continues to be concern regarding a possible retinal dystrophy.       STANDARD EOG REPORT    DATA FOR EOG Right Eye Left Eye NORMAL   [place] ratio 4.80 4.94 >1.8      INTERPRETATION:  Normal electrooculogram (EOG)  The Jeff ratio, the ratio of the Light peak to Dark trough potentials is used to determine the normalcy of the results. An Jeff ratio of 1.80 or greater is normal, 1.65 to 1.80 is subnormal, and < 1.65 is significantly subnormal.    Tech notes: Functional OVF and pERG and ffERG done just prior.  Instructed on importance of maintaining attention and relaxation for optimum recordings.  EOG discussed and performed with E3 system.  Light adapted 15 minutes after ffERG.   Monitored for fix and follow.  Becoming fatigued w/eyelid drooping for DA phase.  Appears light sensitive w/squinting for most of LA phase.  NOTE:  D/T pt's vision with 'dark clouds', fixation spot is not consistently visualized well enough for pt to follow continuously.  Instructed pt to move eyes in a steady pace from one side to the other as far as she can go.  It is not necessary to continuously see the fixation target.  Pt had more difficulty suppressing chronic cough for EOG than with other testing today.  [place] testing reliable enough today.      Dear Addy, thank you for the opportunity to provide electrophysiologic services for this patient.  Please do not hesitate to call if there should be any questions regarding these results.    Sincerely,    Flor Watts MD  Professor of ophthalmology  Retina Service   Department of Ophthalmology and Visual Neurosciences   [facility]  Phone: [phone]   Fax: [phone]

## 2024-02-14 NOTE — NURSING NOTE
"Referred by Dr. Addy Lubin/[facility] for functional OVF+pERG+ffERG+EOG as well as genetic counseling (video appt w/Shreya 01-05-24, testing yet to be done).  Last eye exam w/Dr. Lubin 11-14-23:  1)  Central visual loss right eye.  There continues to be no findings that suggest a clear ocular cause for her symptom of missing faces and a significant decrease in her central VA.  H/O intracranial shunt 2003 and replaced 2006/2007 d/t infection.  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  3)  Dry AMD, intermediate stage right eye.  Dry ARMD is mild and has minimal impact on vision at this time.  4)  History of blood clots.  Currently on Warfarin.  5)  Sarcoidosis dx .  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  C/O \"dark clouds in front of eyes\".  Renewed DL 4 yrs ago but now no longer feels comfortable to drive.  Needs additional lighting compared to others.  Not especially light sensitive.   No followup yet scheduled w/Dr. Lubin; will await results.  "

## 2024-02-14 NOTE — PROGRESS NOTES
"2024    STANDARD OVF+pERG+ffERG+EOG REPORT    Testing Date:  2024    Referring: Dr. Addy Lubin/[facility]    RE: Eunice Barrientos  MRN: [id number]  : 1970                 CLINICAL HISTORY: Referred by Dr. Addy Lubin/[facility] for functional OVF+pERG+ffERG+EOG as well as genetic counseling (video appt with Shreya 24, testing yet to be done).  Last eye exam with Dr. Lubin 23:  1)  Central visual loss right eye.  There continues to be no findings that suggest a clear ocular cause for her symptom of missing faces and a significant decrease in her central VA.  H/O intracranial shunt  and replaced  d/t infection.  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  3)  Dry AMD, intermediate stage right eye.  Dry ARMD is mild and has minimal impact on vision at this time.  4)  History of blood clots.  Currently on Warfarin.  5)  Sarcoidosis dx .  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  C/O \"dark clouds in front of eyes\".  Renewed DL 4 yrs ago but now no longer feels comfortable to drive.  Needs additional lighting compared to others.  Not especially light sensitive.       IMPRESSION Octopus 900:  constriction of the visual field both eyes  left eye >> right eye     Tech notes: Good fix. Not dilated. On the right eye patient did miss 3 vectors as she could not see them. On the left eye while testing two of the test the patient could not see. Due to patient not seeing the dots there was nothing to connect at this time.     Right eye: constricted visual field to 110 Horizontal and 70 degrees vertical with V4e; no other scotomas identified  left eye: constricted visual field to 55 Horizontal and 20 degrees vertical with V4e; no other scotomas identified    IMPRESSION pERG: Macula dysfunction left eye      Tech " notes: Functional OVF done just prior, both eyes undilated.  Instructed on importance of maintaining fixation and relaxation for optimum recordings.  pERG discussed and performed with E3 system, both eyes undilated w/pg.  Halberg clips used for near cxn.  Pupils ~4mm.  Tolerated dtl nicely.  Nice large eye openings and lax lids.  Equal and adequate eye opening w/easy effort to clear pupils as needed.  Impedances low and comparable throughout.  Relaxed in exam chair.  Good fixation and generally good concentration.  NOTE:  +Fatigued and monitored for droopy lids d/t not a good night's sleep.  Increased rejection and duration to accept minimum for averaging but very little noise.      Interpretation: This  pattern electroretinogram (PERG) was performed according to ISCEV standards using YgleION E3 system and DTL fiber recording electrodes. No dilation necessary for the PERG. The patient tolerated the testing well. The patient had good fixation and no difficulty with blinking.  The waveforms are fairly reproducible and well formed.  The responses were asymmetric between both eyes with right eye responses greater than left eye     In conclusion: this represents abnormal PERG suspicious for macula dysfunction left eye   The pattern electroretinogram (PERG) provides an objective measure of central retinal function. The PERG contains two main components, a positivity at approximately 50ms (P50) and a larger negativity at approximately 95ms (N95).   Right eye appears normal and in the left eye the P50 component is affected by macular dysfunction with concomitant reduction in N95.     Visual Acuity Right Eye : 20/300      W/gls, -6.50 + 1.75x084    Visual Acuity Left Eye : HM, temporal      W/gls, -6.50sph       ALL AVERAGED   Data for Pattern ERG Right Eye  Amplitude ( v) Left Eye    Patient Normal Patient    P50 Amplitude 1.33(2.24) not avail 0.87(0.90)    N95  Amplitude -2.70(-3.48) not avail -1.43(-1.11)    N95 : P50  Ratio 2.03(1.55) not avail 1.64(1.23)   --- = residual to non-measurable      Parentheses = @50cm, otherwise data is @100cm         STANDARD ffERG REPORT    IMPRESSION ffERG: mild asymmetry in the responses between both eyes with right eye responses greater than left eye. Otherwise likely Normal electroretinogram (ERG)                                      ALL AVERAGED                  Data for Full-Field ERG  Right Eye  Left Eye   DARK-ADAPTED Patient Normal Patient   0.01 ERG (octaviano) b-wave amplitude ( v) 216 70 to 354.6 174*   0.01 ERG (octaviano) b-wave implicit time (ms) 88.8 83.8 to 108.6 86.5*         3.0 ERG (combined) a-wave amplitude ( v) -160 -268.4 to -93.8 -135   3.0 ERG (combined) a-wave implicit time (ms) 21.6 11.9 to 20.3 22.2   3.0 ERG (combined) b-wave amplitude ( v) 312 173.9 to 400.3 249   3.0 ERG (combined) b-wave implicit time (ms) 53.8 37.2 to 56.8 56         10.0 ERG (brighter combined)a-wave amplitude ( v) -203 -279.4 to -113 -168   10.0 ERG (brighter combined)a-wave implicit time (ms) 13.9 10.6 to 15.4 13.3   10.0 ERG (brighter combined)b-wave amplitude ( v) 327 199.9 to 437.1 257   10.0 ERG (brighter combined)b-wave implicit time (ms) 53.8 36.1 to 52.7 56.6         3.0 Oscillatory Potentials  present    LIGHT-ADAPTED       3.0 Flicker (30Hz) amplitude ( v) 89 57.2 to 155.2 80   3.0 Flicker (30Hz) implicit time (ms) 27.7 23.7 to 30.3 28.8         3.0 ERG (cone) a-wave amplitude ( v) -27 -53.6 to -15.2 -22   3.0 ERG (cone) a-wave implicit time (ms) 15.5 11.7 to 16.7 16.1   3.0 ERG (cone) b-wave amplitude ( v) 110 56.6 to 191.8 92   3.0 ERG (cone) b-wave implicit time (ms) 31.6 27.4 to 34 32.2   * = manipulated cursors  parentheses = cursors at selected peaks  ---- = residual to non-measurable  xxxx = not tested      Tech notes: Functional OVF and pERG done just prior, both eyes undilated.   Instructed on importance of maintaining fixation and relaxation for optimum recordings.  ffERG discussed and  performed with E3 system.  Equally well-dilated ~9mm.  Tolerated dtl nicely.  Nice large eye openings w/lax lids.  Equal and adequate eye opening with easy effort to clear dilated pupils.  Impedances low and comparable throughout.  No difficulty w/blinking.  Specifically, no eyelid flutter to bright flashes DA+LA and none evidenced with flicker.    NOTE:  Occasional cough more bothersome during DA 3.0 and DA 10.0.        Interpretation:  The electroretinogram was performed according to ISCEV standards using ESPION E3 system and DTL fiber recording electrodes. The patient tolerated the testing well. The waveforms are fairly reproducible and well formed. The responses in between both eyes were mildly asymmetric with right eye responses greater than left eye.   The normative values provided above represent the 95% confidence limits for a normal individual the age of the patient. The patient s responses are averaged.  In scotopic conditions, the octaviano specific responses were normal in both eyes.  The maximal response, a combined octaviano and cone responses were essentially normal and the implicit time was normal in both eyes. The implicit time was delayed in the combined a-wave of both eyes. The 10.0 brighter combined was not electronegative but there was delayed b-wave implicit time.  In light adapted conditions, the 30-Hz flicker response has a normal amplitude and normal implicit time in both eyes. The single photopic flash response are normal.    Conclusion: mild asymmetry in the responses between both eyes with right eye responses greater than left eye. Otherwise likely Normal electroretinogram (ERG). There is no evidence of significant octaviano or cone photoreceptor abnormalities in either eye. There is delayed on the implicit time combined a-wave response and in the brighter combined b-wave response of unclear significance.    Clinical correlation is recommended.       I would recommend a repeated electroretinogram in a  couple of years if there continues to be concern regarding a possible retinal dystrophy.       STANDARD EOG REPORT      DATA FOR EOG Right Eye Left Eye NORMAL   Jeff ratio 4.80 4.94 >1.8      INTERPRETATION:  Normal electrooculogram (EOG)  The [place] ratio, the ratio of the Light peak to dark trough potentials is used to determine the normalcy of the results. An Jeff ratio of 1.80 or greater is normal, 1.65 to 1.80 is subnormal, and < 1.65 is significantly subnormal.    Tech notes: Functional OVF and pERG and ffERG done just prior.  Instructed on importance of maintaining attention and relaxation for optimum recordings.  EOG discussed and performed with E3 system.  Light adapted 15 minutes after ffERG.   Monitored for fix and follow.  Becoming fatigued w/eyelid drooping for DA phase.  Appears light sensitive w/squinting for most of LA phase.NOTE:  D/T pt's vision with 'dark clouds', fixation spot is not consistently visualized well enough for pt to follow continuously.  Instructed pt to move eyes in a steady pace from one side to the other as far as she can go.  It is not necessary to continuously see the fixation target.  Pt had more difficulty suppressing chronic cough for EOG than with other testing today.  [place] testing reliable enough today.      Dear Addy, thank you for the opportunity to provide electrophysiologic services for this patient.  Please do not hesitate to call if there should be any questions regarding these results.    Sincerely,    Flor Watts MD  Professor of ophthalmology  Retina Service   Department of Ophthalmology and Visual Neurosciences   [facility]  Phone: [phone]   Fax: [phone]

## 2024-02-21 ENCOUNTER — TELEPHONE (OUTPATIENT)
Dept: OPHTHALMOLOGY | Facility: CLINIC | Age: 54
End: 2024-02-21
Payer: COMMERCIAL

## 2024-02-21 NOTE — TELEPHONE ENCOUNTER
02-21-24  Retrieved .  Pt had multiple tests here on Feb 14 and would like to discuss results.  Called and spoke w/pt.  Informed that results (letter) was sent to referring provider.  Referring provider should be contacting pt to discuss.  Referring MD is Dr. Addy Lubin/[facility]. Phone# [phone].  If any further questions, pt can message Dr. Watts.      Pt phoned Dr. Lubin's office and no letter yet received.  Epic shows letter was sent.  Advised to wait until next Monday.  If no response from Dr. Lubin, then we will re-fax or send hard copy in the mail.  Pt says MyChart here does not work.  Attempted to fix and pt will try again-->MyChart works!    02-27-24  Pt phoned to let us know that Dr. Lubin has still not received the ERG report of 02-14.  Will route to Patricia DELACRUZ to fax directly:    Contact = [place]  Phone = [phone]  Fax = [phone]

## 2024-06-02 ENCOUNTER — HEALTH MAINTENANCE LETTER (OUTPATIENT)
Age: 54
End: 2024-06-02

## 2024-08-12 ENCOUNTER — TRANSFERRED RECORDS (OUTPATIENT)
Dept: HEALTH INFORMATION MANAGEMENT | Facility: CLINIC | Age: 54
End: 2024-08-12
Payer: COMMERCIAL

## 2024-08-21 ENCOUNTER — TRANSFERRED RECORDS (OUTPATIENT)
Dept: HEALTH INFORMATION MANAGEMENT | Facility: CLINIC | Age: 54
End: 2024-08-21
Payer: COMMERCIAL

## 2024-09-17 ENCOUNTER — TRANSCRIBE ORDERS (OUTPATIENT)
Dept: OTHER | Age: 54
End: 2024-09-17

## 2024-09-17 ENCOUNTER — TELEPHONE (OUTPATIENT)
Dept: OPHTHALMOLOGY | Facility: CLINIC | Age: 54
End: 2024-09-17
Payer: COMMERCIAL

## 2024-09-17 DIAGNOSIS — H53.419: Primary | ICD-10-CM

## 2024-09-17 NOTE — TELEPHONE ENCOUNTER
Health Call Center    Phone Message    May a detailed message be left on voicemail: yes     Reason for Call: Appointment Intake    Referring Provider Name: Referred by: Addy Lubin MD  [facility]  [address] [place] [id number]  Phone: [phone]  Fax: [phone]       Diagnosis and/or Symptoms: Central loss of vision ,   Vision loss of the left eye with no clear retinal etiology.        Requesting Dr. Kiser, please review    Action Taken: Message routed to:  [facility] eye    Travel Screening: Not Applicable     Date of Service:

## 2024-09-18 NOTE — TELEPHONE ENCOUNTER
FUTURE VISIT INFORMATION      FUTURE VISIT INFORMATION:  Date: 10/16/24  Time: 9:30am  Location: [facility]  REFERRAL INFORMATION:  Referring provider:  Addy Lubin MD   Referring providers clinic:  [facility]   Reason for visit/diagnosis  -Vision loss of the left eye with no clear retinal etiology     RECORDS REQUESTED FROM:       Clinic name Comments Records Status Imaging Status   [facility]  Request for recs sent 9/18/24- received and sent to scanning epic    [facility] OV 1/24/23 Care everywhere    [facility] OV/notes 2/21/24-11/20/24 epic    Imaging CT Head done 8/27/24  CT Head Brain done 2/1/24  CT Head Brain 1/30/24- [facility]- requested images be pushed, resent 10/2, 10/9

## 2024-09-18 NOTE — TELEPHONE ENCOUNTER
Called and LVM     Sent a [employer] message     Ok to schedule with Dr. Kiser in a new neuro     [facility] Communication Facilitator on 9/18/2024 at 10:54 AM

## 2024-10-11 ENCOUNTER — TELEPHONE (OUTPATIENT)
Dept: OPHTHALMOLOGY | Facility: CLINIC | Age: 54
End: 2024-10-11
Payer: COMMERCIAL

## 2024-10-11 NOTE — TELEPHONE ENCOUNTER
Spoke with patient confirming appointment on 10/16/24. Provided appointment details over th phone.-Per Patient

## 2024-10-14 DIAGNOSIS — H53.40 VISUAL FIELD DEFECT: Primary | ICD-10-CM

## 2024-10-16 ENCOUNTER — PRE VISIT (OUTPATIENT)
Dept: OPHTHALMOLOGY | Facility: CLINIC | Age: 54
End: 2024-10-16

## 2024-10-16 ENCOUNTER — TELEPHONE (OUTPATIENT)
Dept: OPHTHALMOLOGY | Facility: CLINIC | Age: 54
End: 2024-10-16

## 2024-10-16 NOTE — TELEPHONE ENCOUNTER
Spoke with patient regarding rescheduling for missed Appointment. Rescheduled patient accordingly and sent reminder letter to confirmed address.-Per Patient

## 2024-10-25 ENCOUNTER — TELEPHONE (OUTPATIENT)
Dept: OPHTHALMOLOGY | Facility: CLINIC | Age: 54
End: 2024-10-25
Payer: COMMERCIAL

## 2024-10-28 NOTE — PROGRESS NOTES
Eunice Barrientos is a 54 year old female with the following diagnoses:   1. AMD (age related macular degeneration)    2. History of idiopathic intracranial hypertension    3. S/P  shunt    4. Optic atrophy - Both Eyes         Patient was sent for consultation by Dr. Lubin for Vision loss of the right eye with no clear retinal etiology.    HPI:    Eunice Barrientos is a 53 y.o. female with PMH of pulmonary sarcoidosis on steroids, pulmonary nodules, hypertension, venous thrombosis on chronic anticoagulation, hypothyroidism, morbid obesity, depression, anxiety.    She is a vague historian.  Was diagnosed with macular degeneration in her 30's but her vision became much worse in .     Had a shunt in  due to high intracranial pressure. She was told that she had papilledema while pregnant. Shunt infection x 2 in  - . Her vision started to worsen around this time. It seems to have worsened in the last 2 years or so.  She does not seen well out of the LEFT eye hardly at all.  This has been going on for many years.    Has undergone genetic testing on Monday, but there are no results yet. Overall, her RIGHT eye seems like it is a little worse.  She is still driving.  She is able to read.  She has noted that recently her eyes seem to be burning and she also has excessive tearing.  She states that some days there are days when she is unable to drive and others where she can.    When she was seen at the [facility] in February for electrophysiology testing her visual acuity was 20/300 in the right eye.  When she saw  in 2024 her visual acuity is 20/250 in the right eye.    Independent historians:  Patient    Review of outside testin/27/24 CT head w/o contrast   Impression    1.  Unchanged ventricular caliber.  2.  No high-grade spinal canal or neural foraminal stenosis.    2024 pattern ERG  In conclusion: this represents abnormal PERG suspicious for macula dysfunction left  eye   The pattern electroretinogram (PERG) provides an objective measure of central retinal function. The PERG contains two main components, a positivity at approximately 50ms (P50) and a larger negativity at approximately 95ms (N95).   Right eye appears normal and in the left eye the P50 component is affected by macular dysfunction with concomitant reduction in N95.    Normal electrooculogram (EOG)     My interpretation performed today of outside testing:  I have independently reviewed the CT scan and this shows small ventricles.     Review of outside clinical notes:    08/12/2024-- Visit with Dr. Lubin        Visit with Shanique 2/14/24    Referred by Dr. Addy Lubin/[facility] for functional OVF+pERG+ffERG+EOG as well as genetic counseling (video appt w/Shreya 01-05-24, testing yet to be done).  Last eye exam w/Dr. Lubin 11-14-23:  1)  Central visual loss right eye.  There continues to be no findings that suggest a clear ocular cause for her symptom of missing faces and a significant decrease in her central VA.  H/O intracranial shunt 2003 and replaced 2006/2007 d/t infection.  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  3)  Dry AMD, intermediate stage right eye.  Dry ARMD is mild and has minimal impact on vision at this time.  4)  History of blood clots.  Currently on Warfarin.  5)  Sarcoidosis dx .  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       Past medical history:    Patient Active Problem List   Diagnosis    Hypothyroidism    Intracranial shunt    CARDIOVASCULAR SCREENING; LDL GOAL LESS THAN 130    Hypertension goal BP (blood pressure) < 140/90    Morbid obesity (H)    Chronic left-sided low back pain with right-sided sciatica    History of pulmonary embolism    Lumbosacral radiculopathy    Early dry stage nonexudative age-related macular degeneration of  right eye    Advanced atrophic nonexudative age-related macular degeneration of left eye with subfoveal involvement   Sarcoidosis  Morbid obesity  Blind left eye  Diverticulosis          Medications:   amLODIPine  diazepam  levothyroxine  nystatin  WARFARIN SODIUM PO      Family history / social history:  Patient's family history includes Cancer in her maternal aunt; Diabetes in her maternal grandmother; Hypertension in her maternal grandmother.     Patient  reports that she has never smoked. She has never used smokeless tobacco. She reports that she does not drink alcohol and does not use drugs. She is not working.        Exam:  Today, her visual acuity is 20/40 in the right eye and count fingers in the left eye.  She identifies 11 of 11 color plates with the right eye.  She has drusen scattered throughout both eyes with a large central scar in the macula of the left eye.  There is a trace afferent pupillary defect in the left eye.    Tests ordered and interpreted today:  Visual field testing showed a double arcuate scotoma in the right eye and a central scotoma in the left eye  OCT showed mild thinning of the nerve fiber layer in both eyes.        Discussion of management / interpretation with another provider:   None    Assessment/Plan:   This patient has some type of unknown retinal condition that has been present since she was in her 30s.  She recently under went genetic testing for a hereditary maculopathy and the results are unknown.  There has been a question of why her vision has been so poor in the right eye since this does not appear to correlate with her ocular pathology.  Her visual acuity has tested quite poorly in the right eye on 2 different occasions in February and August.  Today, she is testing at 20/40 in the right eye.  She also indicates that she has episodes of severe blurring of the vision and is unable to drive and other times she is able to drive.  Recently she is described burning and  tearing of her eyes.  I believe the most consistent diagnosis here would be dry eye syndrome causing her to have intermittent blurring of her vision.  I believe that 20/40 is reasonable given her retinal pathology.    Of note, she has mild retinal nerve fiber layer thinning in both eyes.  I believe that this is most likely related to her history of papilledema status post  shunt.  More than likely the papilledema cause some mild damage to the optic nerves and we are seeing the sequelae of this.  On dilated fundus examination she does not appear to have optic disc pallor.  In either case, I do not believe any further workup is necessary and encouraged the patient to treat her dry eye.  If she continues to have significant episodes of blurring of the vision then we can have her visit with a dry eye specialist.             Attending Physician Attestation:  Complete documentation of historical and exam elements from today's encounter can be found in the full encounter summary report (not reduplicated in this progress note).  I personally obtained the chief complaint(s) and history of present illness.  I confirmed and edited as necessary the review of systems, past medical/surgical history, family history, social history, and examination findings as documented by others; and I examined the patient myself.  I personally reviewed the relevant tests, images, and reports as documented above.  I formulated and edited as necessary the assessment and plan and discussed the findings and management plan with the patient and family. - Zeke Kiser MD           Precharting:  Kamran Lock MD   Fellow, Neuro-Ophthalmology

## 2024-10-30 ENCOUNTER — OFFICE VISIT (OUTPATIENT)
Dept: OPHTHALMOLOGY | Facility: CLINIC | Age: 54
End: 2024-10-30
Payer: COMMERCIAL

## 2024-10-30 DIAGNOSIS — Z98.2 S/P VP SHUNT: ICD-10-CM

## 2024-10-30 DIAGNOSIS — H35.30 AMD (AGE RELATED MACULAR DEGENERATION): Primary | ICD-10-CM

## 2024-10-30 DIAGNOSIS — Z86.69 HISTORY OF IDIOPATHIC INTRACRANIAL HYPERTENSION: ICD-10-CM

## 2024-10-30 DIAGNOSIS — H47.20 OPTIC ATROPHY: ICD-10-CM

## 2024-10-30 PROCEDURE — 92083 EXTENDED VISUAL FIELD XM: CPT | Performed by: OPHTHALMOLOGY

## 2024-10-30 PROCEDURE — 92133 CPTRZD OPH DX IMG PST SGM ON: CPT | Performed by: OPHTHALMOLOGY

## 2024-10-30 PROCEDURE — 99204 OFFICE O/P NEW MOD 45 MIN: CPT | Performed by: OPHTHALMOLOGY

## 2024-10-30 RX ORDER — TRAZODONE HYDROCHLORIDE 50 MG/1
50 TABLET, FILM COATED ORAL AT BEDTIME
COMMUNITY

## 2024-10-30 RX ORDER — AZELASTINE HYDROCHLORIDE 137 UG/1
SPRAY, METERED NASAL
COMMUNITY
Start: 2023-11-27

## 2024-10-30 RX ORDER — AMLODIPINE BESYLATE 5 MG/1
1 TABLET ORAL
COMMUNITY
Start: 2024-08-21

## 2024-10-30 RX ORDER — LIRAGLUTIDE 6 MG/ML
INJECTION, SOLUTION SUBCUTANEOUS
COMMUNITY

## 2024-10-30 RX ORDER — ALBUTEROL SULFATE 90 UG/1
AEROSOL, METERED RESPIRATORY (INHALATION)
COMMUNITY

## 2024-10-30 RX ORDER — LOSARTAN POTASSIUM 25 MG/1
25 TABLET ORAL 2 TIMES DAILY
COMMUNITY

## 2024-10-30 RX ORDER — BUPROPION HYDROCHLORIDE 150 MG/1
150 TABLET ORAL
COMMUNITY
Start: 2024-08-19

## 2024-10-30 ASSESSMENT — EXTERNAL EXAM - RIGHT EYE: OD_EXAM: NORMAL

## 2024-10-30 ASSESSMENT — VISUAL ACUITY
CORRECTION_TYPE: GLASSES
OD_CC: 20/40
METHOD: SNELLEN - LINEAR
OS_CC: CF@1FOOT
OD_CC+: -2

## 2024-10-30 ASSESSMENT — REFRACTION_WEARINGRX
OS_SPHERE: -6.50
OS_CYLINDER: SPHERE
OD_CYLINDER: +1.75
OD_SPHERE: -6.50
OD_AXIS: 084
SPECS_TYPE: BIFOCAL

## 2024-10-30 ASSESSMENT — CUP TO DISC RATIO
OD_RATIO: 0.2
OS_RATIO: 0.2

## 2024-10-30 ASSESSMENT — CONF VISUAL FIELD
OD_NORMAL: 1
OD_INFERIOR_NASAL_RESTRICTION: 0
OD_INFERIOR_TEMPORAL_RESTRICTION: 0
METHOD: COUNTING FINGERS
OS_SUPERIOR_TEMPORAL_RESTRICTION: 2
OD_SUPERIOR_NASAL_RESTRICTION: 0
OS_INFERIOR_TEMPORAL_RESTRICTION: 3
OD_SUPERIOR_TEMPORAL_RESTRICTION: 0
OS_SUPERIOR_NASAL_RESTRICTION: 3
OS_INFERIOR_NASAL_RESTRICTION: 1

## 2024-10-30 ASSESSMENT — TONOMETRY
OS_IOP_MMHG: 8
IOP_METHOD: ICARE
OD_IOP_MMHG: 9

## 2024-10-30 ASSESSMENT — SLIT LAMP EXAM - LIDS
COMMENTS: NORMAL
COMMENTS: NORMAL

## 2024-10-30 ASSESSMENT — EXTERNAL EXAM - LEFT EYE: OS_EXAM: NORMAL

## 2024-10-30 NOTE — NURSING NOTE
Chief Complaints and History of Present Illnesses   Patient presents with    Vision Changes Os     Chief Complaint(s) and History of Present Illness(es)       Vision Changes Os              Laterality: left eye    Onset: 1 year ago    Severity: severe    Frequency: constantly    Course: stable    Response to treatment: no improvement              Comments    Has had vision loss for over a year.  Was diagnosed with macular degeneration.  She had shunt placed due to sinus stenosis.  Did have swelling of optic nerves.  Thinks after shunt placed did have vision loss.after that time kind of.  Sometimes feels it is stable and sometimes feels it is worse.      Judith Pena on 10/30/2024 at 7:12 AM

## 2024-10-30 NOTE — LETTER
2024     RE:  :  MRN: Eunice Barrientos  1970  [id number]     Dear Dr. Lubin    Thank you for asking me to see your very pleasant patient,Eunice Barrientos, in neuro-ophthalmic consultation.  I would like to thank you for sending your records and I have summarized them in the history of present illness.   My assessment and plan are below.  For further details, please see my attached clinic note.      Eunice Barrientos is a 54 year old female with the following diagnoses:   1. AMD (age related macular degeneration)    2. History of idiopathic intracranial hypertension    3. S/P  shunt    4. Optic atrophy - Both Eyes       Patient was sent for consultation by Dr. Lubin for Vision loss of the right eye with no clear retinal etiology.    HPI: Eunice Barrientos is a 53 y.o. female with PMH of pulmonary sarcoidosis on steroids, pulmonary nodules, hypertension, venous thrombosis on chronic anticoagulation, hypothyroidism, morbid obesity, depression, anxiety.    She is a vague historian.  Was diagnosed with macular degeneration in her 30's but her vision became much worse in .     Had a shunt in  due to high intracranial pressure. She was told that she had papilledema while pregnant. Shunt infection x 2 in  - . Her vision started to worsen around this time. It seems to have worsened in the last 2 years or so.  She does not seen well out of the LEFT eye hardly at all.  This has been going on for many years.    Has undergone genetic testing on Monday, but there are no results yet. Overall, her RIGHT eye seems like it is a little worse.  She is still driving.  She is able to read.  She has noted that recently her eyes seem to be burning and she also has excessive tearing.  She states that some days there are days when she is unable to drive and others where she can.    When she was seen at the [facility] in February for electrophysiology testing her visual acuity was 20/300 in the right  eye.  When she saw  in 2024 her visual acuity is 20/250 in the right eye.    Independent historians:  Patient  Review of outside testin/27/24 CT head w/o contrast   Impression    1.  Unchanged ventricular caliber.  2.  No high-grade spinal canal or neural foraminal stenosis.    2024 pattern ERG    In conclusion: this represents abnormal PERG suspicious for macula dysfunction left eye   The pattern electroretinogram (PERG) provides an objective measure of central retinal function. The PERG contains two main components, a positivity at approximately 50ms (P50) and a larger negativity at approximately 95ms (N95).   Right eye appears normal and in the left eye the P50 component is affected by macular dysfunction with concomitant reduction in N95.    Normal electrooculogram (EOG)     My interpretation performed today of outside testing: I have independently reviewed the CT scan and this shows small ventricles.     Review of outside clinical notes:  2024-- Visit with Dr. Lubin        Visit with Shanique 24    Referred by Dr. Addy Lubin/[facility] for functional OVF+pERG+ffERG+EOG as well as genetic counseling (video appt w/Shreya 24, testing yet to be done).  Last eye exam w/Dr. Lubin 23:  1)  Central visual loss right eye.  There continues to be no findings that suggest a clear ocular cause for her symptom of missing faces and a significant decrease in her central VA.  H/O intracranial shunt  and replaced  d/t infection.  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  3)  Dry AMD, intermediate stage right eye.  Dry ARMD is mild and has minimal impact on vision at this time.  4)  History of blood clots.  Currently on Warfarin.  5)  Sarcoidosis dx .  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     Past medical  history:  Patient Active Problem List   Diagnosis    Hypothyroidism    Intracranial shunt    CARDIOVASCULAR SCREENING; LDL GOAL LESS THAN 130    Hypertension goal BP (blood pressure) < 140/90    Morbid obesity (H)    Chronic left-sided low back pain with right-sided sciatica    History of pulmonary embolism    Lumbosacral radiculopathy    Early dry stage nonexudative age-related macular degeneration of right eye    Advanced atrophic nonexudative age-related macular degeneration of left eye with subfoveal involvement   Sarcoidosis  Morbid obesity  Blind left eye  Diverticulosis    Medications:   amLODIPine  diazepam  levothyroxine  nystatin  WARFARIN SODIUM PO    Family history / social history:  Patient's family history includes Cancer in her maternal aunt; Diabetes in her maternal grandmother; Hypertension in her maternal grandmother.     Patient  reports that she has never smoked. She has never used smokeless tobacco. She reports that she does not drink alcohol and does not use drugs. She is not working.      Exam: Today, her visual acuity is 20/40 in the right eye and count fingers in the left eye.  She identifies 11 of 11 color plates with the right eye.  She has drusen scattered throughout both eyes with a large central scar in the macula of the left eye.  There is a trace afferent pupillary defect in the left eye.    Tests ordered and interpreted today: Visual field testing showed a double arcuate scotoma in the right eye and a central scotoma in the left eye  OCT showed mild thinning of the nerve fiber layer in both eyes.    Discussion of management / interpretation with another provider: None    Assessment/Plan:  This patient has some type of unknown retinal condition that has been present since she was in her 30s.  She recently under went genetic testing for a hereditary maculopathy and the results are unknown.  There has been a question of why her vision has been so poor in the right eye since this does  not appear to correlate with her ocular pathology.  Her visual acuity has tested quite poorly in the right eye on 2 different occasions in February and August.  Today, she is testing at 20/40 in the right eye.  She also indicates that she has episodes of severe blurring of the vision and is unable to drive and other times she is able to drive.  Recently she is described burning and tearing of her eyes.  I believe the most consistent diagnosis here would be dry eye syndrome causing her to have intermittent blurring of her vision.  I believe that 20/40 is reasonable given her retinal pathology.    Of note, she has mild retinal nerve fiber layer thinning in both eyes.  I believe that this is most likely related to her history of papilledema status post  shunt.  More than likely the papilledema cause some mild damage to the optic nerves and we are seeing the sequelae of this.  On dilated fundus examination she does not appear to have optic disc pallor.  In either case, I do not believe any further workup is necessary and encouraged the patient to treat her dry eye.  If she continues to have significant episodes of blurring of the vision then we can have her visit with a dry eye specialist.    Again, thank you for allowing me to participate in the care of your patient.      Sincerely,    Zeke Kiser MD  Professor  Director of Neuro-Ophthalmology  [facility] [facility] [employer] Endowed Chair  Departments of Ophthalmology, Neurology, and Neurosurgery  [address]  [address]  T - [phone]  F - [phone]  [email or web address]      CC: Addy Lubin MD  [facility]  [address]  [facility] [address]  Via Fax: [phone]

## 2024-12-22 ENCOUNTER — HEALTH MAINTENANCE LETTER (OUTPATIENT)
Age: 54
End: 2024-12-22

## 2025-01-20 ENCOUNTER — TRANSFERRED RECORDS (OUTPATIENT)
Dept: HEALTH INFORMATION MANAGEMENT | Facility: CLINIC | Age: 55
End: 2025-01-20
Payer: COMMERCIAL

## 2025-01-26 ENCOUNTER — HEALTH MAINTENANCE LETTER (OUTPATIENT)
Age: 55
End: 2025-01-26